# Patient Record
Sex: FEMALE | Race: BLACK OR AFRICAN AMERICAN | NOT HISPANIC OR LATINO | Employment: OTHER | URBAN - METROPOLITAN AREA
[De-identification: names, ages, dates, MRNs, and addresses within clinical notes are randomized per-mention and may not be internally consistent; named-entity substitution may affect disease eponyms.]

---

## 2021-07-14 ENCOUNTER — HISTORICAL (OUTPATIENT)
Dept: PREADMISSION TESTING | Facility: HOSPITAL | Age: 61
End: 2021-07-14

## 2021-07-14 LAB
ABS NEUT (OLG): 6.05 X10(3)/MCL (ref 2.1–9.2)
BASOPHILS # BLD AUTO: 0.1 X10(3)/MCL (ref 0–0.2)
BASOPHILS NFR BLD AUTO: 1 %
BUN SERPL-MCNC: 66 MG/DL (ref 9.8–20.1)
CALCIUM SERPL-MCNC: 8.8 MG/DL (ref 8.4–10.2)
CHLORIDE SERPL-SCNC: 107 MMOL/L (ref 98–107)
CO2 SERPL-SCNC: 19 MMOL/L (ref 23–31)
CREAT SERPL-MCNC: 3.77 MG/DL (ref 0.55–1.02)
CREAT/UREA NIT SERPL: 18
EOSINOPHIL # BLD AUTO: 0.1 X10(3)/MCL (ref 0–0.9)
EOSINOPHIL NFR BLD AUTO: 1 %
ERYTHROCYTE [DISTWIDTH] IN BLOOD BY AUTOMATED COUNT: 12.8 % (ref 11.5–17)
GLUCOSE SERPL-MCNC: 123 MG/DL (ref 82–115)
HCT VFR BLD AUTO: 28.6 % (ref 37–47)
HGB BLD-MCNC: 9.3 GM/DL (ref 12–16)
LYMPHOCYTES # BLD AUTO: 3 X10(3)/MCL (ref 0.6–4.6)
LYMPHOCYTES NFR BLD AUTO: 30 %
MCH RBC QN AUTO: 29 PG (ref 27–31)
MCHC RBC AUTO-ENTMCNC: 32.5 GM/DL (ref 33–36)
MCV RBC AUTO: 89.1 FL (ref 80–94)
MONOCYTES # BLD AUTO: 0.9 X10(3)/MCL (ref 0.1–1.3)
MONOCYTES NFR BLD AUTO: 8 %
NEUTROPHILS # BLD AUTO: 6.05 X10(3)/MCL (ref 2.1–9.2)
NEUTROPHILS NFR BLD AUTO: 59 %
PLATELET # BLD AUTO: 416 X10(3)/MCL (ref 130–400)
PMV BLD AUTO: 11.2 FL (ref 9.4–12.4)
POTASSIUM SERPL-SCNC: 4.9 MMOL/L (ref 3.5–5.1)
RBC # BLD AUTO: 3.21 X10(6)/MCL (ref 4.2–5.4)
SODIUM SERPL-SCNC: 137 MMOL/L (ref 136–145)
WBC # SPEC AUTO: 10.2 X10(3)/MCL (ref 4.5–11.5)

## 2021-07-26 ENCOUNTER — HISTORICAL (OUTPATIENT)
Dept: SURGERY | Facility: HOSPITAL | Age: 61
End: 2021-07-26

## 2021-07-26 LAB
ANION GAP SERPL CALC-SCNC: 16 MMOL/L
BUN SERPL-MCNC: >50 MG/DL (ref 8–26)
CHLORIDE SERPL-SCNC: 112 MMOL/L (ref 98–109)
CREAT SERPL-MCNC: 3.5 MG/DL (ref 0.6–1.3)
EST CREAT CLEARANCE SER (OHS): 22.73 ML/MIN
GLUCOSE SERPL-MCNC: 167 MG/DL (ref 70–105)
HCT VFR BLD CALC: 31 % (ref 38–51)
HGB BLD-MCNC: 10.5 MG/DL (ref 12–17)
POC IONIZED CALCIUM: 1.18 MMOL/L (ref 1.12–1.32)
POC TCO2: 19 MMOL/L (ref 24–29)
POTASSIUM BLD-SCNC: 5.3 MMOL/L (ref 3.5–4.9)
SARS-COV-2 AG RESP QL IA.RAPID: NEGATIVE
SODIUM BLD-SCNC: 141 MMOL/L (ref 138–146)

## 2021-09-29 ENCOUNTER — TELEPHONE (OUTPATIENT)
Dept: TRANSPLANT | Facility: CLINIC | Age: 61
End: 2021-09-29

## 2021-10-04 DIAGNOSIS — Z76.82 ORGAN TRANSPLANT CANDIDATE: Primary | ICD-10-CM

## 2021-10-13 ENCOUNTER — TELEPHONE (OUTPATIENT)
Dept: TRANSPLANT | Facility: CLINIC | Age: 61
End: 2021-10-13

## 2021-12-03 ENCOUNTER — TELEPHONE (OUTPATIENT)
Dept: TRANSPLANT | Facility: CLINIC | Age: 61
End: 2021-12-03
Payer: COMMERCIAL

## 2021-12-06 ENCOUNTER — TELEPHONE (OUTPATIENT)
Dept: TRANSPLANT | Facility: CLINIC | Age: 61
End: 2021-12-06
Payer: COMMERCIAL

## 2022-04-30 NOTE — OP NOTE
Patient:   Veronica Arauz             MRN: [id number]            FIN: [id number]               Age:   61 years     Sex:  Female     :  1960   Associated Diagnoses:   Chronic kidney disease, stage 5   Author:   Kala Rendon MD      Operative Note   Operative Information   Date/ Time:  2021 13:10:00.     Procedures Performed: Left radiocephalic fistula   .     Indications: Mrs. Arauz is a 62 y/o woman man with CKD who needs long term dialysis access.  She presents today for creation of a left upper extremity arteriovenous fistula..     Preoperative Diagnosis: Chronic kidney disease, stage 5 (VIE98-TY N18.5).     Postoperative Diagnosis: Chronic kidney disease, stage 5 ([id number] N18.5).     Surgeon: Kala Rendon MD.     Assistant.     Speciman Removed: none   .     Esimated blood loss: loss less than  100  cc.     Description of Procedure/Findings/    Complications: A regional block was placed prior to arriving in the room.  The left arm was prepped in the usual sterile fashion.  Ancef was administered prior to the incision.  An appropriate time out was performed.  Ultrasound was utilized to identify the cephalic vein.   A longitudinal incision was made between the radial artery and the cephalic vein.   Dissection was performed through the superficial soft tissues and then followed the appropriate plane laterally and medially to identify the cephalic vein and radial artery.  Each of these vessels were isolated and side branches were ligated with 4-0 silk when appropriate.  The cephalic vein was ligated distally with a 3-0 silk. The radial artery was smaller than expected and did exhibit some spasm with manipulation.  A longitudinal incision was made on the radial artery.  The vein was spatulated appropriately.  Anastamosis was created with a 6-0 prolene suture. Hemostasis was obtained and the soft tissue was dissected to allow for appropriate lay of the vein graft.  3-0 vicryl was  utilized to close the subcutaneous structures and a 4-0 monocryl was utilized to close the skin.  There was flow to the fistula and a palpable radial pulse at case completion. Dermabond dressing was used to dress the wound. This completed the procedure.      .     Findings:    ,    .     Complications: None.

## 2023-03-20 ENCOUNTER — HOSPITAL ENCOUNTER (INPATIENT)
Facility: HOSPITAL | Age: 63
LOS: 1 days | Discharge: HOME | DRG: 682 | End: 2023-03-22
Attending: EMERGENCY MEDICINE | Admitting: INTERNAL MEDICINE
Payer: COMMERCIAL

## 2023-03-20 DIAGNOSIS — N18.9 CHRONIC RENAL IMPAIRMENT, UNSPECIFIED CKD STAGE: ICD-10-CM

## 2023-03-20 DIAGNOSIS — R07.9 CHEST PAIN: ICD-10-CM

## 2023-03-20 DIAGNOSIS — D64.9 ANEMIA, UNSPECIFIED TYPE: ICD-10-CM

## 2023-03-20 DIAGNOSIS — R06.02 SOB (SHORTNESS OF BREATH): ICD-10-CM

## 2023-03-20 DIAGNOSIS — J96.01 ACUTE HYPOXEMIC RESPIRATORY FAILURE: Primary | ICD-10-CM

## 2023-03-20 DIAGNOSIS — I10 HYPERTENSION, UNSPECIFIED TYPE: ICD-10-CM

## 2023-03-20 DIAGNOSIS — E16.2 HYPOGLYCEMIA: ICD-10-CM

## 2023-03-20 DIAGNOSIS — E87.70 HYPERVOLEMIA, UNSPECIFIED HYPERVOLEMIA TYPE: ICD-10-CM

## 2023-03-20 DIAGNOSIS — J81.0 ACUTE PULMONARY EDEMA: ICD-10-CM

## 2023-03-20 DIAGNOSIS — N18.6 ESRD (END STAGE RENAL DISEASE): ICD-10-CM

## 2023-03-20 PROBLEM — E78.5 HYPERLIPIDEMIA: Status: ACTIVE | Noted: 2023-03-20

## 2023-03-20 PROBLEM — E11.9 DIABETES MELLITUS: Status: ACTIVE | Noted: 2023-03-20

## 2023-03-20 LAB
ALBUMIN SERPL-MCNC: 3.2 G/DL (ref 3.4–4.8)
ALBUMIN/GLOB SERPL: 0.7 RATIO (ref 1.1–2)
ALP SERPL-CCNC: 125 UNIT/L (ref 40–150)
ALT SERPL-CCNC: 21 UNIT/L (ref 0–55)
APPEARANCE UR: CLEAR
AST SERPL-CCNC: 20 UNIT/L (ref 5–34)
BACTERIA #/AREA URNS AUTO: ABNORMAL /HPF
BASOPHILS # BLD AUTO: 0.04 X10(3)/MCL (ref 0–0.2)
BASOPHILS NFR BLD AUTO: 0.2 %
BILIRUB UR QL STRIP.AUTO: NEGATIVE MG/DL
BILIRUBIN DIRECT+TOT PNL SERPL-MCNC: 0.5 MG/DL
BNP BLD-MCNC: 262.8 PG/ML
BUN SERPL-MCNC: 87.6 MG/DL (ref 9.8–20.1)
CALCIUM SERPL-MCNC: 8.6 MG/DL (ref 8.4–10.2)
CHLORIDE SERPL-SCNC: 109 MMOL/L (ref 98–107)
CO2 SERPL-SCNC: 14 MMOL/L (ref 23–31)
COLOR UR AUTO: COLORLESS
CORRECTED TEMPERATURE (PCO2): 34 MMHG (ref 35–45)
CORRECTED TEMPERATURE (PH): 7.24 (ref 7.3–7.6)
CORRECTED TEMPERATURE (PO2): 72 MMHG (ref 75–100)
CREAT SERPL-MCNC: 6.64 MG/DL (ref 0.55–1.02)
EOSINOPHIL # BLD AUTO: 0.09 X10(3)/MCL (ref 0–0.9)
EOSINOPHIL NFR BLD AUTO: 0.5 %
ERYTHROCYTE [DISTWIDTH] IN BLOOD BY AUTOMATED COUNT: 12.6 % (ref 11.5–17)
EST. AVERAGE GLUCOSE BLD GHB EST-MCNC: 91.1 MG/DL
FERRITIN SERPL-MCNC: 542.32 NG/ML (ref 4.63–204)
FLUAV AG UPPER RESP QL IA.RAPID: NOT DETECTED
FLUBV AG UPPER RESP QL IA.RAPID: NOT DETECTED
GFR SERPLBLD CREATININE-BSD FMLA CKD-EPI: 7 MLS/MIN/1.73/M2
GLOBULIN SER-MCNC: 4.9 GM/DL (ref 2.4–3.5)
GLUCOSE SERPL-MCNC: 78 MG/DL (ref 82–115)
GLUCOSE UR QL STRIP.AUTO: NORMAL MG/DL
HBA1C MFR BLD: 4.8 %
HCO3 UR-SCNC: 14.6 MMOL/L
HCT VFR BLD AUTO: 27.3 % (ref 37–47)
HGB BLD-MCNC: 8.9 G/DL (ref 12–16)
HGB BLD-MCNC: 9.3 G/DL (ref 12–18)
HYALINE CASTS #/AREA URNS LPF: ABNORMAL /LPF
IMM GRANULOCYTES # BLD AUTO: 0.07 X10(3)/MCL (ref 0–0.04)
IMM GRANULOCYTES NFR BLD AUTO: 0.4 %
IRON SATN MFR SERPL: 19 % (ref 20–50)
IRON SERPL-MCNC: 36 UG/DL (ref 50–170)
KETONES UR QL STRIP.AUTO: NEGATIVE MG/DL
LEUKOCYTE ESTERASE UR QL STRIP.AUTO: NEGATIVE UNIT/L
LYMPHOCYTES # BLD AUTO: 1.38 X10(3)/MCL (ref 0.6–4.6)
LYMPHOCYTES NFR BLD AUTO: 8.3 %
MAGNESIUM SERPL-MCNC: 2.9 MG/DL (ref 1.6–2.6)
MCH RBC QN AUTO: 29.8 PG
MCHC RBC AUTO-ENTMCNC: 32.6 G/DL (ref 33–36)
MCV RBC AUTO: 91.3 FL (ref 80–94)
MONOCYTES # BLD AUTO: 1.17 X10(3)/MCL (ref 0.1–1.3)
MONOCYTES NFR BLD AUTO: 7 %
MUCOUS THREADS URNS QL MICRO: ABNORMAL /LPF
NEUTROPHILS # BLD AUTO: 13.96 X10(3)/MCL (ref 2.1–9.2)
NEUTROPHILS NFR BLD AUTO: 83.6 %
NITRITE UR QL STRIP.AUTO: NEGATIVE
NRBC BLD AUTO-RTO: 0 %
PCO2 BLDA: 34 MMHG (ref 35–45)
PH SMN: 7.24 [PH] (ref 7.3–7.6)
PH UR STRIP.AUTO: 5.5 [PH]
PHOSPHATE SERPL-MCNC: 7.7 MG/DL (ref 2.3–4.7)
PLATELET # BLD AUTO: 363 X10(3)/MCL (ref 130–400)
PMV BLD AUTO: 10.8 FL (ref 7.4–10.4)
PO2 BLDA: 72 MMHG (ref 75–100)
POC BASE DEFICIT: -11.8 MMOL/L (ref -2–2)
POC COHB: 1.6 % (ref 0.5–1.5)
POC METHB: 0.8 % (ref 0–1.5)
POC O2HB: 92.8 % (ref 94–100)
POC PERFORMED BY: ABNORMAL
POC SATURATED O2: 91 % (ref 90–100)
POC TEMPERATURE: 37 C
POCT GLUCOSE: 105 MG/DL (ref 70–110)
POCT GLUCOSE: 116 MG/DL (ref 70–110)
POCT GLUCOSE: 92 MG/DL (ref 70–110)
POTASSIUM SERPL-SCNC: 4.8 MMOL/L (ref 3.5–5.1)
PROT SERPL-MCNC: 8.1 GM/DL (ref 5.8–7.6)
PROT UR QL STRIP.AUTO: ABNORMAL MG/DL
RBC # BLD AUTO: 2.99 X10(6)/MCL (ref 4.2–5.4)
RBC #/AREA URNS AUTO: ABNORMAL /HPF
RBC UR QL AUTO: ABNORMAL UNIT/L
SARS-COV-2 RNA RESP QL NAA+PROBE: NOT DETECTED
SODIUM SERPL-SCNC: 137 MMOL/L (ref 136–145)
SP GR UR STRIP.AUTO: 1.01
SPECIMEN SOURCE: ABNORMAL
SQUAMOUS #/AREA URNS LPF: ABNORMAL /HPF
TIBC SERPL-MCNC: 150 UG/DL (ref 70–310)
TIBC SERPL-MCNC: 186 UG/DL (ref 250–450)
TRANSFERRIN SERPL-MCNC: 159 MG/DL (ref 173–360)
TROPONIN I SERPL-MCNC: 0.04 NG/ML (ref 0–0.04)
TSH SERPL-ACNC: 0.8 UIU/ML (ref 0.35–4.94)
UROBILINOGEN UR STRIP-ACNC: NORMAL MG/DL
WBC # SPEC AUTO: 16.7 X10(3)/MCL (ref 4.5–11.5)
WBC #/AREA URNS AUTO: ABNORMAL /HPF

## 2023-03-20 PROCEDURE — 83036 HEMOGLOBIN GLYCOSYLATED A1C: CPT | Performed by: EMERGENCY MEDICINE

## 2023-03-20 PROCEDURE — 81001 URINALYSIS AUTO W/SCOPE: CPT | Performed by: EMERGENCY MEDICINE

## 2023-03-20 PROCEDURE — 84484 ASSAY OF TROPONIN QUANT: CPT | Performed by: EMERGENCY MEDICINE

## 2023-03-20 PROCEDURE — G0378 HOSPITAL OBSERVATION PER HR: HCPCS

## 2023-03-20 PROCEDURE — 36600 WITHDRAWAL OF ARTERIAL BLOOD: CPT

## 2023-03-20 PROCEDURE — 63600175 PHARM REV CODE 636 W HCPCS

## 2023-03-20 PROCEDURE — 84100 ASSAY OF PHOSPHORUS: CPT | Performed by: EMERGENCY MEDICINE

## 2023-03-20 PROCEDURE — 96365 THER/PROPH/DIAG IV INF INIT: CPT

## 2023-03-20 PROCEDURE — 83880 ASSAY OF NATRIURETIC PEPTIDE: CPT | Performed by: EMERGENCY MEDICINE

## 2023-03-20 PROCEDURE — 83735 ASSAY OF MAGNESIUM: CPT | Performed by: EMERGENCY MEDICINE

## 2023-03-20 PROCEDURE — 84443 ASSAY THYROID STIM HORMONE: CPT | Performed by: EMERGENCY MEDICINE

## 2023-03-20 PROCEDURE — 96372 THER/PROPH/DIAG INJ SC/IM: CPT

## 2023-03-20 PROCEDURE — 83550 IRON BINDING TEST: CPT

## 2023-03-20 PROCEDURE — 82728 ASSAY OF FERRITIN: CPT

## 2023-03-20 PROCEDURE — 93005 ELECTROCARDIOGRAM TRACING: CPT

## 2023-03-20 PROCEDURE — 82962 GLUCOSE BLOOD TEST: CPT

## 2023-03-20 PROCEDURE — 96375 TX/PRO/DX INJ NEW DRUG ADDON: CPT

## 2023-03-20 PROCEDURE — 25000003 PHARM REV CODE 250

## 2023-03-20 PROCEDURE — 99285 EMERGENCY DEPT VISIT HI MDM: CPT | Mod: 25

## 2023-03-20 PROCEDURE — 99900035 HC TECH TIME PER 15 MIN (STAT)

## 2023-03-20 PROCEDURE — 0240U COVID/FLU A&B PCR: CPT | Performed by: EMERGENCY MEDICINE

## 2023-03-20 PROCEDURE — 80053 COMPREHEN METABOLIC PANEL: CPT | Performed by: EMERGENCY MEDICINE

## 2023-03-20 PROCEDURE — 82803 BLOOD GASES ANY COMBINATION: CPT

## 2023-03-20 PROCEDURE — 87040 BLOOD CULTURE FOR BACTERIA: CPT

## 2023-03-20 PROCEDURE — 85025 COMPLETE CBC W/AUTO DIFF WBC: CPT | Performed by: EMERGENCY MEDICINE

## 2023-03-20 RX ORDER — METOPROLOL SUCCINATE 25 MG/1
25 TABLET, EXTENDED RELEASE ORAL
Status: ON HOLD | COMMUNITY
Start: 2023-03-01 | End: 2023-03-22 | Stop reason: HOSPADM

## 2023-03-20 RX ORDER — LABETALOL HCL 20 MG/4 ML
10 SYRINGE (ML) INTRAVENOUS EVERY 4 HOURS PRN
Status: DISCONTINUED | OUTPATIENT
Start: 2023-03-20 | End: 2023-03-23 | Stop reason: HOSPADM

## 2023-03-20 RX ORDER — DULAGLUTIDE 1.5 MG/.5ML
1.5 INJECTION, SOLUTION SUBCUTANEOUS
Status: ON HOLD | COMMUNITY
Start: 2023-01-27 | End: 2023-09-08 | Stop reason: HOSPADM

## 2023-03-20 RX ORDER — TORSEMIDE 20 MG/1
40 TABLET ORAL
Status: ON HOLD | COMMUNITY
Start: 2023-03-01 | End: 2023-03-22 | Stop reason: HOSPADM

## 2023-03-20 RX ORDER — CALCITRIOL 0.25 UG/1
0.5 CAPSULE ORAL DAILY
COMMUNITY
Start: 2022-12-01 | End: 2023-10-05

## 2023-03-20 RX ORDER — LOSARTAN POTASSIUM 50 MG/1
50 TABLET ORAL DAILY
Status: ON HOLD | COMMUNITY
Start: 2023-03-01 | End: 2023-09-08 | Stop reason: HOSPADM

## 2023-03-20 RX ORDER — CALCITRIOL 0.25 UG/1
0.5 CAPSULE ORAL DAILY
Status: DISCONTINUED | OUTPATIENT
Start: 2023-03-21 | End: 2023-03-23 | Stop reason: HOSPADM

## 2023-03-20 RX ORDER — DEXTROSE 40 %
30 GEL (GRAM) ORAL
Status: DISCONTINUED | OUTPATIENT
Start: 2023-03-20 | End: 2023-03-23 | Stop reason: HOSPADM

## 2023-03-20 RX ORDER — HEPARIN SODIUM 5000 [USP'U]/ML
7500 INJECTION, SOLUTION INTRAVENOUS; SUBCUTANEOUS EVERY 12 HOURS
Status: DISCONTINUED | OUTPATIENT
Start: 2023-03-20 | End: 2023-03-23 | Stop reason: HOSPADM

## 2023-03-20 RX ORDER — AMLODIPINE BESYLATE 5 MG/1
5 TABLET ORAL DAILY
Status: DISCONTINUED | OUTPATIENT
Start: 2023-03-21 | End: 2023-03-21

## 2023-03-20 RX ORDER — HYDRALAZINE HYDROCHLORIDE 20 MG/ML
10 INJECTION INTRAMUSCULAR; INTRAVENOUS EVERY 8 HOURS PRN
Status: DISCONTINUED | OUTPATIENT
Start: 2023-03-20 | End: 2023-03-23 | Stop reason: HOSPADM

## 2023-03-20 RX ORDER — CALCITRIOL 0.5 UG/1
1 CAPSULE ORAL
Status: ON HOLD | COMMUNITY
Start: 2023-03-01 | End: 2023-03-22 | Stop reason: HOSPADM

## 2023-03-20 RX ORDER — DEXTROSE 40 %
15 GEL (GRAM) ORAL
Status: DISCONTINUED | OUTPATIENT
Start: 2023-03-20 | End: 2023-03-23 | Stop reason: HOSPADM

## 2023-03-20 RX ORDER — AMLODIPINE BESYLATE 5 MG/1
5 TABLET ORAL DAILY
COMMUNITY
Start: 2023-03-01

## 2023-03-20 RX ORDER — GLIMEPIRIDE 4 MG/1
4 TABLET ORAL EVERY MORNING
Status: ON HOLD | COMMUNITY
Start: 2023-02-01 | End: 2023-03-22 | Stop reason: HOSPADM

## 2023-03-20 RX ORDER — NALOXONE HCL 0.4 MG/ML
0.02 VIAL (ML) INJECTION
Status: DISCONTINUED | OUTPATIENT
Start: 2023-03-20 | End: 2023-03-23 | Stop reason: HOSPADM

## 2023-03-20 RX ORDER — HYDRALAZINE HYDROCHLORIDE 25 MG/1
25 TABLET, FILM COATED ORAL EVERY 12 HOURS
Status: DISCONTINUED | OUTPATIENT
Start: 2023-03-20 | End: 2023-03-21

## 2023-03-20 RX ORDER — FUROSEMIDE 10 MG/ML
40 INJECTION INTRAMUSCULAR; INTRAVENOUS ONCE
Status: DISCONTINUED | OUTPATIENT
Start: 2023-03-20 | End: 2023-03-20

## 2023-03-20 RX ORDER — METOPROLOL SUCCINATE 25 MG/1
25 TABLET, EXTENDED RELEASE ORAL 2 TIMES DAILY
Status: DISCONTINUED | OUTPATIENT
Start: 2023-03-20 | End: 2023-03-21

## 2023-03-20 RX ORDER — SODIUM CHLORIDE 0.9 % (FLUSH) 0.9 %
10 SYRINGE (ML) INJECTION EVERY 12 HOURS PRN
Status: DISCONTINUED | OUTPATIENT
Start: 2023-03-20 | End: 2023-03-23 | Stop reason: HOSPADM

## 2023-03-20 RX ORDER — FUROSEMIDE 10 MG/ML
80 INJECTION INTRAMUSCULAR; INTRAVENOUS ONCE
Status: COMPLETED | OUTPATIENT
Start: 2023-03-20 | End: 2023-03-20

## 2023-03-20 RX ORDER — INSULIN ASPART 100 [IU]/ML
0-5 INJECTION, SOLUTION INTRAVENOUS; SUBCUTANEOUS
Status: DISCONTINUED | OUTPATIENT
Start: 2023-03-20 | End: 2023-03-23 | Stop reason: HOSPADM

## 2023-03-20 RX ORDER — GLUCAGON 1 MG
1 KIT INJECTION
Status: DISCONTINUED | OUTPATIENT
Start: 2023-03-20 | End: 2023-03-23 | Stop reason: HOSPADM

## 2023-03-20 RX ADMIN — METOPROLOL SUCCINATE 25 MG: 25 TABLET, EXTENDED RELEASE ORAL at 08:03

## 2023-03-20 RX ADMIN — HEPARIN SODIUM 7500 UNITS: 5000 INJECTION, SOLUTION INTRAVENOUS; SUBCUTANEOUS at 08:03

## 2023-03-20 RX ADMIN — CEFTRIAXONE SODIUM 1 G: 1 INJECTION, POWDER, FOR SOLUTION INTRAMUSCULAR; INTRAVENOUS at 08:03

## 2023-03-20 RX ADMIN — LABETALOL HYDROCHLORIDE 10 MG: 5 INJECTION, SOLUTION INTRAVENOUS at 06:03

## 2023-03-20 RX ADMIN — FUROSEMIDE 80 MG: 10 INJECTION, SOLUTION INTRAMUSCULAR; INTRAVENOUS at 06:03

## 2023-03-20 RX ADMIN — HYDRALAZINE HYDROCHLORIDE 25 MG: 25 TABLET ORAL at 08:03

## 2023-03-20 NOTE — H&P
"History and Physical     Date of Admit: 3/20/2023  Current Hospital Day: 1     Subjective:      Brief HPI:  Veronica Arauz is a 62 y.o. female who  has a past medical history of Diabetes mellitus and Renal disorder.  She presented to [facility] ED on 3/19/2023  with a primary complaint of generalized weakness, shakiness, and changes in speech. Was found to be hypoglycemic in the 30s. Sx improved after given juice and sandwich and glucagon. Incidentally found to have UTI and was given IV CTX for one dose and discharged home with outpatient macrobid which has not been filled because this morning patient had another hypoglycemic episode and was seen again in [facility] given orange juice, sandwich and patient returned to baseline mentation. Although she was SOB with signs of volume overload at that time, was given lasix in the ED. There she also satted 74% on RA per triage note. Patient states that for the past 2 weeks she has been getting congestion, dry cough, post-nasal drip and mild dyspnea with exertion. Stated that her ASHER would resolved rapidly after resting. Reports sleeping in recliner because she has trouble breathing lying in bed. Does endorse leg swelling, foul smelling urine.  She denies any fever, chills, sinus pain, sore throat, N/V/D, abd pain, sputum production, dysuria, hematuria, CP, dysuria, frequency, hematuria.         Patient follows Dr. Camacho for nephrology. Has had a LUE AV fistula for "over 2 years" anticipating the need for dialysis although has not been used yet.    Prior records indicated patient is on trulicity but pt states she discontinued this herself a month ago. She is on amaryl which her pcp had recently reduced dose from 4 to 2 2/2 hypoglycemic episodes.     Past Medical History:   Diagnosis Date    Diabetes mellitus     Renal disorder        Past Surgical History:   Procedure Laterality Date    HYSTERECTOMY         Review of patient's allergies indicates:  No Known " Allergies    Current Outpatient Medications   Medication Instructions    amLODIPine (NORVASC) 5 mg, Oral    calcitRIOL (ROCALTROL) 0.5 mcg, Oral    calcitRIOL (ROCALTROL) 1 mcg, Oral    glimepiride (AMARYL) 4 mg, Oral, Every morning    losartan (COZAAR) 50 mg, Oral    metoprolol succinate (TOPROL-XL) 25 mg, Oral    torsemide (DEMADEX) 40 mg, Oral    TRULICITY 1.5 mg/0.5 mL pen injector Subcutaneous        Family History    None         Tobacco Use    Smoking status: Never    Smokeless tobacco: Never   Substance and Sexual Activity    Alcohol use: Never    Drug use: Never    Sexual activity: Not on file        Review of Systems:  Review of Systems   Constitutional:  Negative for chills and fever.   HENT:  Positive for congestion. Negative for sinus pain and sore throat.         (+) post nasal drip   Respiratory:  Positive for cough and shortness of breath. Negative for hemoptysis and sputum production.    Cardiovascular:  Positive for leg swelling. Negative for chest pain and palpitations.   Gastrointestinal:  Negative for abdominal pain, nausea and vomiting.   Genitourinary:  Negative for flank pain and hematuria.   Skin:  Negative for itching and rash.   Neurological:  Negative for weakness and headaches.   Endo/Heme/Allergies:  Does not bruise/bleed easily.        Objective:     Vital Signs:  Vital Signs (Most Recent):  Temp: 98.2 °F (36.8 °C) (03/20/23 1233)  Pulse: 94 (03/20/23 1702)  Resp: (!) 26 (03/20/23 1702)  BP: (!) 193/99 (03/20/23 1702)  SpO2: 97 % (03/20/23 1702)   Vital Signs (24h Range):  Temp:  [98.2 °F (36.8 °C)] 98.2 °F (36.8 °C)  Pulse:  [87-97] 94  Resp:  [20-31] 26  SpO2:  [74 %-100 %] 97 %  BP: (173-193)/(82-99) 193/99   Body mass index is 41.12 kg/m².   No intake or output data in the 24 hours ending 03/20/23 1749    Physical Examination:  General:  Well developed, well nourished, no acute distress. Sitting in chair by bed wrapped in blankets.   Head: Normocephalic, atraumatic  ENT: PERRL,  MMM. Pharynx is clear without erythema, exudates.No post-nasal drip. Uvula midline.   Neck: supple, normal ROM, no JVD  CVS:  RRR. S1 and S2 normal. No murmurs, rubs, or gallops. Regular peripheral pulses. 2-3+ edema to BLE. LUE with AV graft with palpable thrill and audible bruit.   Resp:  Lungs clear to auscultation bilaterally, no wheezes, rales, or rhonchi. Normal respiratory effort.  GI:  Abdomen soft, non-tender, non-distended, normoactive bowel sounds  MSK:  Full range of motion, no obvious deformities  Skin:  No rashes, ulcers, erythema  Neuro:  Alert and oriented x3, No focal neuro deficits, CNII-XII grossly intact  Psych:  Appropriate mood and affect     Laboratory:    Recent Labs   Lab 03/20/23  1511   WBC 16.7*   HGB 8.9*   HCT 27.3*      MCV 91.3   RDW 12.6     Recent Labs   Lab 03/20/23  1511   TROPONINI 0.036   .8*     Recent Labs   Lab 03/20/23  1511   TROPONINI 0.036   .8*     No results for input(s): CHOL, HDL, LDLCALC, TRIG, CHOLHDL in the last 168 hours. Recent Labs   Lab 03/20/23  1511      K 4.8   CO2 14*   BUN 87.6*   CREATININE 6.64*   CALCIUM 8.6   MG 2.90*   PHOS 7.7*     Recent Labs   Lab 03/20/23  1511   ALBUMIN 3.2*   BILITOT 0.5   AST 20   ALKPHOS 125   ALT 21     No results for input(s): IRON, TIBC, FERRITIN, SATURATEDIRO, [id number], FOLATE in the last 168 hours.  Recent Labs   Lab 03/20/23  1511   TSH 0.798   HGBA1C 4.8          Microbiology Data:  Microbiology Results (last 7 days)       ** No results found for the last 168 hours. **             Other Results:    Radiology:  Imaging Results              X-Ray Chest PA And Lateral (Final result)  Result time 03/20/23 16:07:30      Final result by Stoney Gutierrez MD (03/20/23 16:07:30)                   Impression:      Central vascular congestion with mild edema      Electronically signed by: Stoney Gutierrez MD  Date:    03/20/2023  Time:    16:07               Narrative:    EXAMINATION:  XR CHEST PA AND  LATERAL    CLINICAL HISTORY:  Chest Pain;    COMPARISON:  07/14/2020    FINDINGS:  PA and lateral views of the chest show no focal consolidation, pneumothorax or pleural effusion.  Cardiac silhouette is enlarged.  There is central vascular congestion with prominent interstitial markings bilaterally.  Aorta is partially calcified.  No acute osseous findings.                                    X-Ray Chest PA And Lateral    Result Date: 3/20/2023  Central vascular congestion with mild edema Electronically signed by: Stoney Gutierrez MD Date:    03/20/2023 Time:    16:07       Current Medications:     Infusions:        Scheduled:   [START ON 3/21/2023] amLODIPine  5 mg Oral Daily    [START ON 3/21/2023] calcitRIOL  0.5 mcg Oral Daily    furosemide (LASIX) injection  80 mg Intravenous Once    heparin (porcine)  7,500 Units Subcutaneous Q12H    hydrALAZINE  25 mg Oral Q12H    metoprolol succinate  25 mg Oral BID         PRN:   dextrose 10%    dextrose 10%    dextrose    dextrose    glucagon (human recombinant)    hydrALAZINE    labetalol    naloxone    sodium chloride 0.9%        Antibiotics and Day Number of Therapy:  Antibiotics (From admission, onward)      None                 Assessment & Plan:     Acute hypoxemic respiratory failure  Anion gap (14) Metabolic acidosis  Volume overload  - comfortable on 4L O2 by NC.   - ABG pH 7.24, pco2 34, po2 72, hco3 14.6.  - BNP elevated 262.8  - CXR showing central vascular congestion, pedal edema 2-3+ on exam.    - giving lasix 80 tonight.   - gap suspected 2/2 uremia from esrd.     UTI   Leukocytosis wbc = 16.7  - Given CTX at outside facility.   - UA ordered.   - blood cultures x2 ordered.   - Will continue CTX as patient has left shift.    ESRD  Secondary hyperparathyroidism  - Nephrology consulted, appreciate the assistance. They agree with 80 IV lasix to attempt diuresis and will see patient in the morning. Do not suspect emergent HD is required at this time.  - pedal edema  present  -  LUE fistula in place palpable thrill, audible bruit  - avoid nephrotoxic medications  - continue home calcitriol.     HTN urgency  - PRN labetalol, hydralazine ordered.   - continue home amlodipine 5mg  - held home losartan 2/2 acute kidney injury.   - increased home toprol 25 mg daily to BID.   - starting hydralazine 25 mg BID  - CTM and titrate as indicated.     DM2  - Hold home trulicity and glimepiride.   - Low dose SSI        CODE STATUS: full  Access: piv  Antibiotics: none  Diet: diabetic, renal  DVT Prophylaxis: heparin 7500u BID   GI Prophylaxis: none  Fluids: none           Ronal Peñaloza DO  Internal Medicine Resident PGY-I

## 2023-03-20 NOTE — ED PROVIDER NOTES
Encounter Date: 3/20/2023       History     Chief Complaint   Patient presents with    Shortness of Breath     Pt a transfer from [facility] for nephrology.  Pt also complaining of SOB and has an O2 sat of 74% on room air.     Received in transfer, outside records reviewed, discussed with Dr. Leblanc at [facility] in [place].  Patient with chronic renal failure on oral therapy as well for diabetes, has an indwelling left forearm AV shunt for 2 years but has not yet undergone dialysis.  She is followed by Dr. Camacho of Nephrology.  She presented to the ER yesterday with hypoglycemia, symptomatic, does take Trulicity and Amaryl.  Dose of Amaryl recently changed from 4-2 mg a day about a month ago.  She was treated and released but had recurrent hypoglycemia again early this morning, this time on presentation was found to be hypoxic and with signs of significant volume overload including likely pulmonary edema.  She does relate that her lower extremity swelling has been getting worse recently.  Data were comprehensive and are reviewed, notable for mild respiratory acidosis metabolic acidosis with respiratory compensation, anemia, chronic renal failure, mildly elevated BNP, pulmonary edema versus pneumonia by chest film.  That facility did not have available nephrology services, she was transferred here for internal medicine evaluation and likely Nephrology and Cardiology consults.  At the time of transfer she is feeling much better, oxygenating well in an upright position on 3 L nasal cannula. Consulted Internal Medicine on arrival..    The history is provided by the patient and medical records. No  was used.   Review of patient's allergies indicates:  No Known Allergies  Past Medical History:   Diagnosis Date    Diabetes mellitus     Renal disorder      Past Surgical History:   Procedure Laterality Date    HYSTERECTOMY       History reviewed. No pertinent family  history.  Social History     Tobacco Use    Smoking status: Never    Smokeless tobacco: Never   Substance Use Topics    Alcohol use: Never    Drug use: Never     Review of Systems   Constitutional:  Negative for activity change, fatigue and fever.   HENT:  Negative for congestion, ear pain, facial swelling, nosebleeds, sinus pressure and sore throat.    Eyes:  Negative for pain, discharge, redness and visual disturbance.   Respiratory:  Positive for shortness of breath. Negative for cough, choking, chest tightness and wheezing.    Cardiovascular:  Positive for leg swelling. Negative for chest pain and palpitations.   Gastrointestinal:  Negative for abdominal distention, abdominal pain, nausea and vomiting.   Endocrine: Negative for heat intolerance, polydipsia and polyuria.   Genitourinary:  Negative for difficulty urinating, dysuria, flank pain, hematuria and urgency.   Musculoskeletal:  Negative for back pain, gait problem, joint swelling and myalgias.   Skin:  Negative for color change and rash.   Allergic/Immunologic: Negative for environmental allergies and food allergies.   Neurological:  Negative for dizziness, weakness, numbness and headaches.   Hematological:  Negative for adenopathy. Does not bruise/bleed easily.   Psychiatric/Behavioral:  Negative for agitation and behavioral problems. The patient is not nervous/anxious.    All other systems reviewed and are negative.    Physical Exam     Initial Vitals [03/20/23 1233]   BP Pulse Resp Temp SpO2   (!) 174/83 97 (!) 31 98.2 °F (36.8 °C) (!) 74 %      MAP       --         Physical Exam    Nursing note and vitals reviewed.  Constitutional: She appears well-developed and well-nourished. She is not diaphoretic. No distress.   HENT:   Head: Normocephalic and atraumatic.   Mouth/Throat: No oropharyngeal exudate.   Eyes: Conjunctivae and EOM are normal. Pupils are equal, round, and reactive to light. Right eye exhibits no discharge. Left eye exhibits no discharge.  No scleral icterus.   Neck: Neck supple. No thyromegaly present. No tracheal deviation present. No JVD present.   Normal range of motion.  Cardiovascular:  Normal rate, regular rhythm, normal heart sounds and intact distal pulses.     Exam reveals no gallop and no friction rub.       No murmur heard.  Expected thrill and bruit, left forearm AV shunt without sign of infection or dysfunction.   Pulmonary/Chest: No stridor. No respiratory distress. She has no wheezes. She has no rhonchi. She has no rales. She exhibits no tenderness.   Mild tachypnea, dull at the bases   Abdominal: Abdomen is soft. Bowel sounds are normal. She exhibits no distension and no mass. There is no abdominal tenderness. There is no rebound and no guarding.   Musculoskeletal:         General: Edema present. No tenderness. Normal range of motion.      Cervical back: Normal range of motion and neck supple.      Comments: Three or 4+ pitting edema to thighs     Neurological: She is alert and oriented to person, place, and time. She has normal strength.   Skin: Skin is warm and dry. No rash and no abscess noted. No erythema.   Psychiatric: She has a normal mood and affect. Her behavior is normal. Judgment and thought content normal.       ED Course   Procedures  Labs Reviewed   CBC W/ AUTO DIFFERENTIAL    Narrative:     The following orders were created for panel order CBC auto differential.  Procedure                               Abnormality         Status                     ---------                               -----------         ------                     CBC with Differential[[id number]]                                                         Please view results for these tests on the individual orders.   COMPREHENSIVE METABOLIC PANEL   URINALYSIS, REFLEX TO URINE CULTURE   TSH   TROPONIN I   B-TYPE NATRIURETIC PEPTIDE   MAGNESIUM   PHOSPHORUS   COVID/FLU A&B PCR   HEMOGLOBIN A1C   CBC WITH DIFFERENTIAL   POCT GLUCOSE, HAND-HELD DEVICE    POCT GLUCOSE     EKG Readings: (Independently Interpreted)   Initial Reading: No STEMI. Rhythm: Sinus Tachycardia. Heart Rate: 101. Ectopy: No Ectopy.   Sinus tachycardia 101 beats per minute, possible left atrial enlargement, lateral ST and T-wave abnormality possibly ischemic, slightly noisy baseline.  No ST elevation.   ECG Results              EKG 12-lead (In process)  Result time 03/20/23 15:01:32      In process by Interface, [facility] (03/20/23 15:01:32)                   Narrative:    Test Reason : R06.02,    Vent. Rate : 101 BPM     Atrial Rate : 101 BPM     P-R Int : 144 ms          QRS Dur : 074 ms      QT Int : 336 ms       P-R-T Axes : 055 028 133 degrees     QTc Int : 435 ms    Sinus tachycardia  Possible Left atrial enlargement  ST and T wave abnormality, consider lateral ischemia  Abnormal ECG  No previous ECGs available    Referred By: JUWAN COSTELLO           Confirmed By:                                   Imaging Results    None          Medications - No data to display     Additional MDM:   Differential Diagnosis:   Pulmonary Edema/ CHF/ Renal Failure/ Hypoglycemia                 Medical Decision Making  Problems Addressed:  Acute hypoxemic respiratory failure: acute illness or injury  Acute pulmonary edema: acute illness or injury that poses a threat to life or bodily functions    Amount and/or Complexity of Data Reviewed  External Data Reviewed: labs, radiology, ECG and notes.  Labs: ordered. Decision-making details documented in ED Course.  Radiology: ordered. Decision-making details documented in ED Course.  ECG/medicine tests: ordered and independent interpretation performed. Decision-making details documented in ED Course.    Risk  Prescription drug management.  Decision regarding hospitalization.             Clinical Impression:   Final diagnoses:  [R06.02] SOB (shortness of breath)  [J96.01] Acute hypoxemic respiratory failure (Primary)  [J81.0] Acute pulmonary  edema  [E87.70] Hypervolemia, unspecified hypervolemia type  [N18.9] Chronic renal impairment, unspecified CKD stage  [D64.9] Anemia, unspecified type  [I10] Hypertension, unspecified type  [E16.2] Hypoglycemia        ED Disposition Condition    Admit Stable                Cristopher Pierson MD  03/20/23 8420       Cristopher Pierson MD  03/20/23 6434

## 2023-03-21 LAB
ALBUMIN SERPL-MCNC: 3 G/DL (ref 3.4–4.8)
ALBUMIN/GLOB SERPL: 0.7 RATIO (ref 1.1–2)
ALP SERPL-CCNC: 115 UNIT/L (ref 40–150)
ALT SERPL-CCNC: 20 UNIT/L (ref 0–55)
AST SERPL-CCNC: 20 UNIT/L (ref 5–34)
BASOPHILS # BLD AUTO: 0.05 X10(3)/MCL (ref 0–0.2)
BASOPHILS NFR BLD AUTO: 0.3 %
BILIRUBIN DIRECT+TOT PNL SERPL-MCNC: 0.5 MG/DL
BUN SERPL-MCNC: 85.3 MG/DL (ref 9.8–20.1)
CALCIUM SERPL-MCNC: 8.8 MG/DL (ref 8.4–10.2)
CHLORIDE SERPL-SCNC: 111 MMOL/L (ref 98–107)
CO2 SERPL-SCNC: 13 MMOL/L (ref 23–31)
CREAT SERPL-MCNC: 6.55 MG/DL (ref 0.55–1.02)
EOSINOPHIL # BLD AUTO: 0.12 X10(3)/MCL (ref 0–0.9)
EOSINOPHIL NFR BLD AUTO: 0.8 %
ERYTHROCYTE [DISTWIDTH] IN BLOOD BY AUTOMATED COUNT: 12.8 % (ref 11.5–17)
GFR SERPLBLD CREATININE-BSD FMLA CKD-EPI: 7 MLS/MIN/1.73/M2
GLOBULIN SER-MCNC: 4.5 GM/DL (ref 2.4–3.5)
GLUCOSE SERPL-MCNC: 61 MG/DL (ref 82–115)
HAV IGM SERPL QL IA: NONREACTIVE
HBV CORE IGM SERPL QL IA: NONREACTIVE
HBV SURFACE AG SERPL QL IA: NONREACTIVE
HCT VFR BLD AUTO: 26.4 % (ref 37–47)
HCV AB SERPL QL IA: NONREACTIVE
HGB BLD-MCNC: 8.5 G/DL (ref 12–16)
IMM GRANULOCYTES # BLD AUTO: 0.1 X10(3)/MCL (ref 0–0.04)
IMM GRANULOCYTES NFR BLD AUTO: 0.7 %
LYMPHOCYTES # BLD AUTO: 1.73 X10(3)/MCL (ref 0.6–4.6)
LYMPHOCYTES NFR BLD AUTO: 11.5 %
MAGNESIUM SERPL-MCNC: 1.6 MG/DL (ref 1.6–2.6)
MCH RBC QN AUTO: 29.5 PG
MCHC RBC AUTO-ENTMCNC: 32.2 G/DL (ref 33–36)
MCV RBC AUTO: 91.7 FL (ref 80–94)
MONOCYTES # BLD AUTO: 1.21 X10(3)/MCL (ref 0.1–1.3)
MONOCYTES NFR BLD AUTO: 8 %
NEUTROPHILS # BLD AUTO: 11.85 X10(3)/MCL (ref 2.1–9.2)
NEUTROPHILS NFR BLD AUTO: 78.7 %
NRBC BLD AUTO-RTO: 0 %
PHOSPHATE SERPL-MCNC: 8 MG/DL (ref 2.3–4.7)
PLATELET # BLD AUTO: 332 X10(3)/MCL (ref 130–400)
PMV BLD AUTO: 11.1 FL (ref 7.4–10.4)
POCT GLUCOSE: 189 MG/DL (ref 70–110)
POCT GLUCOSE: 198 MG/DL (ref 70–110)
POCT GLUCOSE: 199 MG/DL (ref 70–110)
POCT GLUCOSE: 97 MG/DL (ref 70–110)
POTASSIUM SERPL-SCNC: 5.1 MMOL/L (ref 3.5–5.1)
PROT SERPL-MCNC: 7.5 GM/DL (ref 5.8–7.6)
RBC # BLD AUTO: 2.88 X10(6)/MCL (ref 4.2–5.4)
SODIUM SERPL-SCNC: 138 MMOL/L (ref 136–145)
WBC # SPEC AUTO: 15.1 X10(3)/MCL (ref 4.5–11.5)

## 2023-03-21 PROCEDURE — 63600175 PHARM REV CODE 636 W HCPCS

## 2023-03-21 PROCEDURE — 21400001 HC TELEMETRY ROOM

## 2023-03-21 PROCEDURE — 97165 OT EVAL LOW COMPLEX 30 MIN: CPT

## 2023-03-21 PROCEDURE — 97161 PT EVAL LOW COMPLEX 20 MIN: CPT

## 2023-03-21 PROCEDURE — 84100 ASSAY OF PHOSPHORUS: CPT

## 2023-03-21 PROCEDURE — 85025 COMPLETE CBC W/AUTO DIFF WBC: CPT

## 2023-03-21 PROCEDURE — 80074 ACUTE HEPATITIS PANEL: CPT

## 2023-03-21 PROCEDURE — 80053 COMPREHEN METABOLIC PANEL: CPT

## 2023-03-21 PROCEDURE — 83735 ASSAY OF MAGNESIUM: CPT

## 2023-03-21 PROCEDURE — 25000003 PHARM REV CODE 250: Performed by: INTERNAL MEDICINE

## 2023-03-21 PROCEDURE — 25000003 PHARM REV CODE 250

## 2023-03-21 RX ORDER — HYDRALAZINE HYDROCHLORIDE 25 MG/1
25 TABLET, FILM COATED ORAL EVERY 8 HOURS
Status: DISCONTINUED | OUTPATIENT
Start: 2023-03-21 | End: 2023-03-23 | Stop reason: HOSPADM

## 2023-03-21 RX ORDER — METOPROLOL SUCCINATE 25 MG/1
25 TABLET, EXTENDED RELEASE ORAL ONCE
Status: COMPLETED | OUTPATIENT
Start: 2023-03-21 | End: 2023-03-21

## 2023-03-21 RX ORDER — LABETALOL 100 MG/1
100 TABLET, FILM COATED ORAL EVERY 12 HOURS
Status: DISCONTINUED | OUTPATIENT
Start: 2023-03-21 | End: 2023-03-22

## 2023-03-21 RX ORDER — MUPIROCIN 20 MG/G
OINTMENT TOPICAL 2 TIMES DAILY
Status: DISCONTINUED | OUTPATIENT
Start: 2023-03-21 | End: 2023-03-23 | Stop reason: HOSPADM

## 2023-03-21 RX ORDER — AMLODIPINE BESYLATE 10 MG/1
10 TABLET ORAL DAILY
Status: DISCONTINUED | OUTPATIENT
Start: 2023-03-22 | End: 2023-03-23 | Stop reason: HOSPADM

## 2023-03-21 RX ORDER — BUMETANIDE 0.25 MG/ML
2 INJECTION INTRAMUSCULAR; INTRAVENOUS EVERY 12 HOURS
Status: DISCONTINUED | OUTPATIENT
Start: 2023-03-21 | End: 2023-03-23 | Stop reason: HOSPADM

## 2023-03-21 RX ORDER — SODIUM BICARBONATE 650 MG/1
1300 TABLET ORAL 2 TIMES DAILY
Status: DISCONTINUED | OUTPATIENT
Start: 2023-03-21 | End: 2023-03-23 | Stop reason: HOSPADM

## 2023-03-21 RX ORDER — METOPROLOL SUCCINATE 50 MG/1
50 TABLET, EXTENDED RELEASE ORAL 2 TIMES DAILY
Status: DISCONTINUED | OUTPATIENT
Start: 2023-03-21 | End: 2023-03-21

## 2023-03-21 RX ORDER — LIDOCAINE AND PRILOCAINE 25; 25 MG/G; MG/G
CREAM TOPICAL
Status: DISCONTINUED | OUTPATIENT
Start: 2023-03-21 | End: 2023-03-23 | Stop reason: HOSPADM

## 2023-03-21 RX ADMIN — BUMETANIDE 2 MG: 0.25 INJECTION INTRAMUSCULAR; INTRAVENOUS at 05:03

## 2023-03-21 RX ADMIN — METOPROLOL SUCCINATE 25 MG: 25 TABLET, EXTENDED RELEASE ORAL at 09:03

## 2023-03-21 RX ADMIN — AMLODIPINE BESYLATE 5 MG: 5 TABLET ORAL at 09:03

## 2023-03-21 RX ADMIN — SODIUM BICARBONATE 1300 MG: 650 TABLET ORAL at 08:03

## 2023-03-21 RX ADMIN — HYDRALAZINE HYDROCHLORIDE 25 MG: 25 TABLET ORAL at 09:03

## 2023-03-21 RX ADMIN — CALCITRIOL CAPSULES 0.25 MCG 0.5 MCG: 0.25 CAPSULE ORAL at 09:03

## 2023-03-21 RX ADMIN — HEPARIN SODIUM 7500 UNITS: 5000 INJECTION, SOLUTION INTRAVENOUS; SUBCUTANEOUS at 08:03

## 2023-03-21 RX ADMIN — CEFTRIAXONE SODIUM 1 G: 1 INJECTION, POWDER, FOR SOLUTION INTRAMUSCULAR; INTRAVENOUS at 08:03

## 2023-03-21 RX ADMIN — MUPIROCIN: 20 OINTMENT TOPICAL at 12:03

## 2023-03-21 RX ADMIN — HEPARIN SODIUM 7500 UNITS: 5000 INJECTION, SOLUTION INTRAVENOUS; SUBCUTANEOUS at 09:03

## 2023-03-21 RX ADMIN — MUPIROCIN: 20 OINTMENT TOPICAL at 08:03

## 2023-03-21 RX ADMIN — METOPROLOL SUCCINATE 25 MG: 25 TABLET, EXTENDED RELEASE ORAL at 12:03

## 2023-03-21 RX ADMIN — HYDRALAZINE HYDROCHLORIDE 25 MG: 25 TABLET ORAL at 02:03

## 2023-03-21 RX ADMIN — LIDOCAINE AND PRILOCAINE: 25; 25 CREAM TOPICAL at 02:03

## 2023-03-21 RX ADMIN — LABETALOL HYDROCHLORIDE 100 MG: 100 TABLET, FILM COATED ORAL at 05:03

## 2023-03-21 NOTE — PT/OT/SLP EVAL
Physical Therapy Evaluation and Discharge Note    Patient Name:  Veronica Arauz   MRN:  [id number]    Recommendations:     Discharge Recommendations: home  Discharge Equipment Recommendations: none   Barriers to discharge: None    Assessment:     Veronica Arauz is a 62 y.o. female admitted with a medical diagnosis of <principal problem not specified>. .  At this time, patient is functioning at their prior level of function and does not require further acute PT services.     Recent Surgery: * No surgery found *    1. Acute hypoxemic respiratory failure    2. SOB (shortness of breath)    3. Acute pulmonary edema    4. Hypervolemia, unspecified hypervolemia type    5. Chronic renal impairment, unspecified CKD stage    6. Anemia, unspecified type    7. Hypertension, unspecified type    8. Hypoglycemia    9. Chest pain        Plan:     During this hospitalization, patient does not require further acute PT services.  Please re-consult if situation changes.      Subjective     Chief Complaint: none  Patient/Family Comments/goals: return home and back to work  Pain/Comfort:  Pain Rating 1: 0/10    Patients cultural, spiritual, Hoahaoism conflicts given the current situation: no    Living Environment:  Pt. Lives in a house with 3 steps and bilat rails.   Prior to admission, patients level of function was independent.  Equipment used at home: none.  DME owned (not currently used): none.  Upon discharge, patient will have assistance from her family.    Objective:     Communicated with nurse Self prior to session.  Patient found supine with peripheral IV upon PT entry to room.    General Precautions: Standard, fall    Orthopedic Precautions:N/A   Braces: N/A  Respiratory Status: Room air    Exams:  Cognitive Exam:  Patient is oriented to Person, Place, Time, and Situation  Gross Motor Coordination:  WFL  RLE ROM: WNL  RLE Strength: WNL  LLE ROM: WNL  LLE Strength: WNL    Functional Mobility:  Bed Mobility:     Supine to Sit:  independence  Transfers:     Sit to Stand:  independence with no AD  Gait: Pt. Has a narrow KB and pt. Has upper trunk initiated sideshift. However pt. And her family said that this is her baseline. No loss of balance observed. Pt. Was able to stand on one leg x 7 sec bilat  Balance: good    Patient left ambulatory in room/lucio with call button in reach, nurse  notified, and sister and granddaughter present.    GOALS:   Multidisciplinary Problems       Physical Therapy Goals       Not on file                    History:     Past Medical History:   Diagnosis Date    Diabetes mellitus     Renal disorder        Past Surgical History:   Procedure Laterality Date    HYSTERECTOMY         Time Tracking:     PT Received On: 03/21/23  PT Start Time: 1237     PT Stop Time: 1252  PT Total Time (min): 15 min     Billable Minutes: Evaluation 15      03/21/2023

## 2023-03-21 NOTE — PLAN OF CARE
New consult to set up outpt dialysis, per daughter's request, Charo Glover [phone], referral submitted to [facility] via the admissions portal. Will follow.

## 2023-03-21 NOTE — PROGRESS NOTES
"History and Physical     Date of Admit: 3/20/2023  Current Hospital Day: 2     Subjective:      Brief HPI:  Veronica Arauz is a 62 y.o. female who  has a past medical history of Diabetes mellitus and Renal disorder.  She presented to [place] ED on 3/19/2023  with a primary complaint of generalized weakness, shakiness, and changes in speech. Was found to be hypoglycemic in the 30s. Sx improved after given juice and sandwich and glucagon. Incidentally found to have UTI and was given IV CTX for one dose and discharged home with outpatient macrobid which has not been filled because this morning patient had another hypoglycemic episode and was seen again in [place] given orange juice, sandwich and patient returned to baseline mentation. Although she was SOB with signs of volume overload at that time, was given lasix in the ED. There she also satted 74% on RA per triage note. Patient states that for the past 2 weeks she has been getting congestion, dry cough, post-nasal drip and mild dyspnea with exertion. Stated that her ASHER would resolved rapidly after resting. Reports sleeping in recliner because she has trouble breathing lying in bed. Does endorse leg swelling, foul smelling urine.  She denies any fever, chills, sinus pain, sore throat, N/V/D, abd pain, sputum production, dysuria, hematuria, CP, dysuria, frequency, hematuria.         Patient follows Dr. Camacho for nephrology. Has had a LUE AV fistula for "over 2 years" anticipating the need for dialysis although has not been used yet.    Prior records indicated patient is on trulicity but pt states she discontinued this herself a month ago. She is on amaryl which her pcp had recently reduced dose from 4 to 2 2/2 hypoglycemic episodes.     Interval hx:  NAEO. Pt reports she did urinate after getting lasix yesterday. No current complaints. Afebrile. Pending nephro consult/recs.     Past Medical History:   Diagnosis Date    Diabetes mellitus     Renal disorder  "       Past Surgical History:   Procedure Laterality Date    HYSTERECTOMY         Review of patient's allergies indicates:  No Known Allergies    Current Outpatient Medications   Medication Instructions    amLODIPine (NORVASC) 5 mg, Oral    calcitRIOL (ROCALTROL) 0.5 mcg, Oral    calcitRIOL (ROCALTROL) 1 mcg, Oral    glimepiride (AMARYL) 4 mg, Oral, Every morning    losartan (COZAAR) 50 mg, Oral    metoprolol succinate (TOPROL-XL) 25 mg, Oral    torsemide (DEMADEX) 40 mg, Oral    TRULICITY 1.5 mg/0.5 mL pen injector Subcutaneous        Family History    None         Tobacco Use    Smoking status: Never    Smokeless tobacco: Never   Substance and Sexual Activity    Alcohol use: Never    Drug use: Never    Sexual activity: Not on file        Review of Systems:  Review of Systems   Constitutional:  Negative for chills and fever.   HENT:  Positive for congestion. Negative for sinus pain and sore throat.         (+) post nasal drip   Respiratory:  Positive for cough and shortness of breath. Negative for hemoptysis and sputum production.    Cardiovascular:  Positive for leg swelling. Negative for chest pain and palpitations.   Gastrointestinal:  Negative for abdominal pain, nausea and vomiting.   Genitourinary:  Negative for flank pain and hematuria.   Skin:  Negative for itching and rash.   Neurological:  Negative for weakness and headaches.   Endo/Heme/Allergies:  Does not bruise/bleed easily.        Objective:     Vital Signs:  Vital Signs (Most Recent):  Temp: 98.2 °F (36.8 °C) (03/21/23 0355)  Pulse: 90 (03/21/23 0355)  Resp: 18 (03/21/23 0355)  BP: (!) 167/68 (03/20/23 2343)  SpO2: 95 % (03/21/23 0355)   Vital Signs (24h Range):  Temp:  [97.9 °F (36.6 °C)-98.4 °F (36.9 °C)] 98.2 °F (36.8 °C)  Pulse:  [87-99] 90  Resp:  [18-31] 18  SpO2:  [74 %-100 %] 95 %  BP: (164-193)/(68-99) 167/68   Body mass index is 41.12 kg/m².   No intake or output data in the 24 hours ending 03/21/23 0601    Physical Examination:  General:   Well developed, well nourished, no acute distress. Sitting in chair by bed wrapped in blankets.   Head: Normocephalic, atraumatic  ENT: PERRL, MMM. Pharynx is clear without erythema, exudates.No post-nasal drip. Uvula midline.   Neck: supple, normal ROM, no JVD  CVS:  RRR. S1 and S2 normal. No murmurs, rubs, or gallops. Regular peripheral pulses. 2-3+ edema to BLE. LUE with AV graft with palpable thrill and audible bruit.   Resp:  Lungs clear to auscultation bilaterally, no wheezes, rales, or rhonchi. Normal respiratory effort. Satting 95% on RA.   GI:  Abdomen soft, non-tender, non-distended, normoactive bowel sounds  MSK:  Full range of motion, no obvious deformities  Skin:  No rashes, ulcers, erythema  Neuro:  Alert and oriented x3, No focal neuro deficits, CNII-XII grossly intact  Psych:  Appropriate mood and affect     Laboratory:    Recent Labs   Lab 03/21/23  0348   WBC 15.1*   HGB 8.5*   HCT 26.4*      MCV 91.7   RDW 12.8           Recent Labs   Lab 03/20/23  1511   TROPONINI 0.036   .8*           Recent Labs   Lab 03/20/23  1511   TROPONINI 0.036   .8*       No results for input(s): CHOL, HDL, LDLCALC, TRIG, CHOLHDL in the last 168 hours. Recent Labs   Lab 03/20/23  1511 03/21/23  0348    138   K 4.8 5.1   CO2 14* 13*   BUN 87.6* 85.3*   CREATININE 6.64* 6.55*   CALCIUM 8.6 8.8   MG 2.90*  --    PHOS 7.7* 8.0*           Recent Labs   Lab 03/21/23  0348   ALBUMIN 3.0*   BILITOT 0.5   AST 20   ALKPHOS 115   ALT 20           Recent Labs   Lab 03/20/23  1511   IRON 36*   TIBC 186*   FERRITIN 542.32*         Recent Labs   Lab 03/20/23  1511   TSH 0.798   HGBA1C 4.8            Microbiology Data:  Microbiology Results (last 7 days)       Procedure Component Value Units Date/Time    Blood Culture [[id number]] Collected: 03/20/23 1941    Order Status: Resulted Specimen: Blood from Wrist, Right Updated: 03/20/23 1947    Blood Culture [[id number]] Collected: 03/20/23 1941    Order Status:  Resulted Specimen: Blood from Hand, Right Updated: 03/20/23 1946             Other Results:    Radiology:  Imaging Results              X-Ray Chest PA And Lateral (Final result)  Result time 03/20/23 16:07:30      Final result by Stoney Gutierrez MD (03/20/23 16:07:30)                   Impression:      Central vascular congestion with mild edema      Electronically signed by: Stoney Gutierrez MD  Date:    03/20/2023  Time:    16:07               Narrative:    EXAMINATION:  XR CHEST PA AND LATERAL    CLINICAL HISTORY:  Chest Pain;    COMPARISON:  07/14/2020    FINDINGS:  PA and lateral views of the chest show no focal consolidation, pneumothorax or pleural effusion.  Cardiac silhouette is enlarged.  There is central vascular congestion with prominent interstitial markings bilaterally.  Aorta is partially calcified.  No acute osseous findings.                                    X-Ray Chest PA And Lateral    Result Date: 3/20/2023  Central vascular congestion with mild edema Electronically signed by: Stoney Gutierrez MD Date:    03/20/2023 Time:    16:07       Current Medications:     Infusions:        Scheduled:   amLODIPine  5 mg Oral Daily    calcitRIOL  0.5 mcg Oral Daily    cefTRIAXone (ROCEPHIN) IVPB  1 g Intravenous Q24H    heparin (porcine)  7,500 Units Subcutaneous Q12H    hydrALAZINE  25 mg Oral Q12H    metoprolol succinate  25 mg Oral BID         PRN:   dextrose 10%    dextrose 10%    dextrose 10%    dextrose 10%    dextrose    dextrose    dextrose    dextrose    glucagon (human recombinant)    glucagon (human recombinant)    hydrALAZINE    insulin aspart U-100    labetalol    naloxone    sodium chloride 0.9%        Antibiotics and Day Number of Therapy:  Antibiotics (From admission, onward)      Start     Stop Route Frequency Ordered    03/20/23 1930  cefTRIAXone (ROCEPHIN) 1 g in dextrose 5 % in water (D5W) 5 % 50 mL IVPB (MB+)         -- IV Every 24 hours (non-standard times) 03/20/23 0675                  Assessment & Plan:     Acute hypoxemic respiratory failure - improved  Anion gap Metabolic acidosis -   Volume overload  - comfortable on RA satting 95%.   - ABG pH 7.24, pco2 34, po2 72, hco3 14.6.  - BNP elevated 262.8  - CXR showing central vascular congestion, pedal edema 2-3+ on exam.    - giving lasix 80 tonight.   - gap suspected 2/2 uremia from esrd. Gap is improving 13 today    UTI   Leukocytosis wbc = 16.7  - Given CTX at outside facility.   - UA does not appear infectious but patient was started on abx at outside facility.   - blood cultures x2 pending.   - Will continue CTX as patient has left shift. afebrile.     ESRD  Secondary hyperparathyroidism  - Nephrology consulted, appreciate the assistance. Pending further recs.   - 80 IV lasix given last night with urine output and slight improvement to Cr.  - pedal edema present  -  LUE fistula in place palpable thrill, audible bruit  - avoid nephrotoxic medications  - continue home calcitriol.     HTN urgency  - PRN labetalol, hydralazine ordered.   - increase home amlodipine 5mg to 10 mg  - held home losartan 2/2 acute kidney injury.   - increased home toprol 50 mg daily to BID.   - starting hydralazine 25 mg TID  - CTM and titrate as indicated.     DM2  - Hold home trulicity and glimepiride.   - Low dose SSI    Normocytyic anemia  Likely 2/2 chronic kidney disease. Iron and TIBC both low.     CODE STATUS: full  Access: piv  Antibiotics: ctx  Diet: diabetic, renal  DVT Prophylaxis: heparin 7500u BID   GI Prophylaxis: none  Fluids: none           Ronal Peñaloza DO  Internal Medicine Resident PGY-I

## 2023-03-21 NOTE — NURSING
Unable to perform hemodialysis due to non functioning AV fistula. AVF infiltrated upon cannulation. Nephrology notified.

## 2023-03-21 NOTE — PT/OT/SLP EVAL
Occupational Therapy   Evaluation and Discharge Note    Name: Veronica Arauz  MRN: [id number]  Admitting Diagnosis:   1. Acute hypoxemic respiratory failure    2. SOB (shortness of breath)    3. Acute pulmonary edema    4. Hypervolemia, unspecified hypervolemia type    5. Chronic renal impairment, unspecified CKD stage    6. Anemia, unspecified type    7. Hypertension, unspecified type    8. Hypoglycemia    9. Chest pain      Patient Active Problem List   Diagnosis    Body mass index (BMI) 40.0-44.9, adult    Diabetes mellitus    Hyperlipidemia    Hypertension    ESRD (end stage renal disease)      Recent Surgery: * No surgery found *      Recommendations:     Discharge Recommendations: home  Discharge Equipment Recommendations: none  Barriers to discharge:  None    Assessment:     Veronica Arauz is a 62 y.o. female with a medical diagnosis of see above. At this time, patient is functioning at their prior level of function and does not require further acute OT services.     Plan:     During this hospitalization, patient does not require further acute OT services.  Please re-consult if situation changes.    Plan of Care Reviewed with: patient, grandchild(jignesh), sibling    Subjective     Chief Complaint: none stated  Patient/Family Comments/goals: return home     Occupational Profile:  Living Environment: Pt lives alone in SS house with 2 steps and B HR and walk in shower .  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  Patient found HOB elevated with peripheral IV upon OT  entry to room.    General Precautions: Standard,    Orthopedic Precautions: N/A  Braces: N/A  Respiratory Status: Room air     Occupational Performance:    Bed Mobility:    Patient completed Supine to Sit with independence    Functional Mobility/Transfers:  Patient completed Sit <> Stand Transfer with independence  with  no assistive device   Patient completed Toilet Transfer Stand Pivot technique with independence with  no AD  Functional Mobility: independent ambulate short distances in room for basic self care tasks to toilet and lavatory without AD     Activities of Daily Living:  Grooming: independence standing at sink  Upper Body Dressing: independence    Lower Body Dressing: modified independence    Toileting: independence      Cognitive/Visual Perceptual:  Cognitive/Psychosocial Skills:     -       Oriented to: Person, Place, Time, and Situation   -       Follows Commands/attention:Follows multistep  commands  -       Safety awareness/insight to disability: intact     Physical Exam:  Balance: -       good standing balance during self care tasks   Sensation:    -       Intact  light/touch B UE and hands  Dominant hand: -       right  Upper Extremity Range of Motion:     -       Right Upper Extremity: WFL  -       Left Upper Extremity: WFL  Upper Extremity Strength:    -       Right Upper Extremity: WFL  -       Left Upper Extremity: WFL   Strength:    -       Right Upper Extremity: WFL  -       Left Upper Extremity: WFL  Fine Motor Coordination:    -       Intact  Left hand, finger to nose, Right hand, finger to nose, Left hand thumb/finger opposition skills, and Right hand thumb/finger opposition skills      Patient left ambulatory in room/lucio with all lines intact, call button in reach, nurse  notified, and granddaughter and sister  present    GOALS:   Multidisciplinary Problems       Occupational Therapy Goals       Not on file                    History:     Past Medical History:   Diagnosis Date     Diabetes mellitus     Renal disorder          Past Surgical History:   Procedure Laterality Date    HYSTERECTOMY         Time Tracking:     OT Date of Treatment: 03/21/23  OT Start Time: 1238  OT Stop Time: 1252  OT Total Time (min): 14 min    Billable Minutes:Evaluation 14 min     3/21/2023

## 2023-03-21 NOTE — PLAN OF CARE
Problem: Adult Inpatient Plan of Care  Goal: Plan of Care Review  Outcome: Ongoing, Progressing  Goal: Patient-Specific Goal (Individualized)  Outcome: Ongoing, Progressing  Goal: Absence of Hospital-Acquired Illness or Injury  Outcome: Ongoing, Progressing  Goal: Optimal Comfort and Wellbeing  Outcome: Ongoing, Progressing  Goal: Readiness for Transition of Care  Outcome: Ongoing, Progressing     Problem: Bariatric Environmental Safety  Goal: Safety Maintained with Care  Outcome: Ongoing, Progressing     Problem: Diabetes Comorbidity  Goal: Blood Glucose Level Within Targeted Range  Outcome: Ongoing, Progressing     Problem: Fluid Volume Excess  Goal: Fluid Balance  Outcome: Ongoing, Progressing

## 2023-03-21 NOTE — PLAN OF CARE
03/21/23 1035   Discharge Assessment   Confirmed/corrected address, phone number and insurance Yes   Confirmed Demographics Correct on Facesheet   Source of Information patient   When was your last doctors appointment?   (Tarun Mcclellan)   Reason For Admission Acute pulmonary edema, SOB, CP, HTN   People in Home alone   Facility Arrived From: [facility]   Do you expect to return to your current living situation? Yes   Do you have help at home or someone to help you manage your care at home? Yes   Who are your caregiver(s) and their phone number(s)? Daughter Charo Glover [phone]   Prior to hospitilization cognitive status: Alert/Oriented   Current cognitive status: Alert/Oriented   Equipment Currently Used at Home none   Do you currently have service(s) that help you manage your care at home? No   Do you take prescription medications? Yes   Do you have prescription coverage? Yes   Coverage BLUE CROSS BLUE SHIELD - BCBS OF [facility]   Do you have any problems affording any of your prescribed medications? No   Is the patient taking medications as prescribed? yes   Who is going to help you get home at discharge? Daughter Charo Glover [phone]   How do you get to doctors appointments? car, drives self   Are you on dialysis? No   Do you take coumadin? No   Discharge Plan A Home   DME Needed Upon Discharge  other (see comments)  (Pending PT/OT eval)   Discharge Plan discussed with: Patient;Adult children  (Charo Glover [phone])   Discharge Barriers Identified None   Physical Activity   On average, how many days per week do you engage in moderate to strenuous exercise (like a brisk walk)? 4 days   On average, how many minutes do you engage in exercise at this level? 20 min   Financial Resource Strain   How hard is it for you to pay for the very basics like food, housing, medical care, and heating? Not hard   Housing Stability   In the last 12 months, was there a time when you were not  able to pay the mortgage or rent on time? N   In the last 12 months, how many places have you lived? 1   Transportation Needs   In the past 12 months, has lack of transportation kept you from medical appointments or from getting medications? no   In the past 12 months, has lack of transportation kept you from meetings, work, or from getting things needed for daily living? No   Food Insecurity   Within the past 12 months, you worried that your food would run out before you got the money to buy more. Never true   Within the past 12 months, the food you bought just didn't last and you didn't have money to get more. Never true   Stress   Do you feel stress - tense, restless, nervous, or anxious, or unable to sleep at night because your mind is troubled all the time - these days? Not at all   Social Connections   In a typical week, how many times do you talk on the phone with family, friends, or neighbors? More than 3   How often do you get together with friends or relatives? More than 3   How often do you attend Rastafari or Jew services? Never   Do you belong to any clubs or organizations such as Rastafari groups, unions, fraternal or athletic groups, or school groups? No   How often do you attend meetings of the clubs or organizations you belong to? Never   Are you , , , , never , or living with a partner?    Alcohol Use   Q1: How often do you have a drink containing alcohol? Never   Q2: How many drinks containing alcohol do you have on a typical day when you are drinking? None   Q3: How often do you have six or more drinks on one occasion? Never

## 2023-03-21 NOTE — CONSULTS
Nephrology Consultation    Date of Consultation:  March 21, 2023    Consultation Requested By:  Dr.K. Peñaloza    Reason for Consultation:  Hemodialysis needs while hospitalized    History of Present Illness:  This is a 62 y.o. female presented to [facility] complaining of weakness and shortness of breath.  Oxygenation on room air was 74% per triage note at [facility].  Patient was noted to have significantly elevated renal indices with a BUN and creatinine of 87.6 and 6.64 respectively.  Bicarbonate was 14.  Patient was transferred for nephrology care.  Patient does have left forearm AV fistula.  Patient relates that she had this fistula for 2 years but is dialysis naive.  Patient's nephrologist is Dr. Camacho.  Renal service was consulted for evaluation and management of initiation of hemodialysis due to massive volume overload as well as severe metabolic acidosis      Review of patient's allergies indicates:  No Known Allergies    Review of systems: 12 point review of systems conducted, negative except as stated in the HPI.     Past Medical History: .  Stage 5 CKD, hypertension and anemia in chronic kidney disease  Past Medical History:   Diagnosis Date    Diabetes mellitus     Renal disorder        Procedure History:   Past Surgical History:   Procedure Laterality Date    HYSTERECTOMY         Family History: family history is not on file.    Social History:  reports that she has never smoked. She has never used smokeless tobacco. She reports that she does not drink alcohol and does not use drugs.    Home Medications:   Medications Prior to Admission   Medication Sig Dispense Refill Last Dose    amLODIPine (NORVASC) 5 MG tablet Take 5 mg by mouth.       calcitRIOL (ROCALTROL) 0.25 MCG Cap Take 0.5 mcg by mouth.       calcitRIOL (ROCALTROL) 0.5 MCG Cap Take 1 mcg by mouth.       glimepiride (AMARYL) 4 MG tablet Take 4 mg by mouth every morning.       losartan (COZAAR) 50 MG tablet Take 50 mg by mouth.        metoprolol succinate (TOPROL-XL) 25 MG 24 hr tablet Take 25 mg by mouth.       torsemide (DEMADEX) 20 MG Tab Take 40 mg by mouth.       TRULICITY 1.5 mg/0.5 mL pen injector Inject into the skin.   3/6/2023       Inpatient Medications:     Current Facility-Administered Medications:     [START ON 3/22/2023] amLODIPine tablet 10 mg, 10 mg, Oral, Daily, Ronal Stroda, DO    calcitRIOL capsule 0.5 mcg, 0.5 mcg, Oral, Daily, Ronal Stroda, DO, 0.5 mcg at 03/21/23 0922    cefTRIAXone (ROCEPHIN) 1 g in dextrose 5 % in water (D5W) 5 % 50 mL IVPB (MB+), 1 g, Intravenous, Q24H, Ronal Stroda, DO, Stopped at 03/20/23 2123    dextrose 10% bolus 125 mL 125 mL, 12.5 g, Intravenous, PRN, Ronal Stroda, DO    dextrose 10% bolus 125 mL 125 mL, 12.5 g, Intravenous, PRN, Ronal Stroda, DO    dextrose 10% bolus 250 mL 250 mL, 25 g, Intravenous, PRN, Ronal Stroda, DO    dextrose 10% bolus 250 mL 250 mL, 25 g, Intravenous, PRN, Ronal Stroda, DO    dextrose 40 % gel 15,000 mg, 15 g, Oral, PRN, Ronal Stroda, DO    dextrose 40 % gel 15,000 mg, 15 g, Oral, PRN, Ronal Stroda, DO    dextrose 40 % gel 30,000 mg, 30 g, Oral, PRN, Ronal Stroda, DO    dextrose 40 % gel 30,000 mg, 30 g, Oral, PRN, Ronal Stroda, DO    glucagon (human recombinant) injection 1 mg, 1 mg, Intramuscular, PRN, Ronal Stroda, DO    glucagon (human recombinant) injection 1 mg, 1 mg, Intramuscular, PRN, Ronal Stroda, DO    heparin (porcine) injection 7,500 Units, 7,500 Units, Subcutaneous, Q12H, Ronal Stroda, DO, 7,500 Units at 03/21/23 0923    hydrALAZINE injection 10 mg, 10 mg, Intravenous, Q8H PRN, Ronal Stroda, DO    hydrALAZINE tablet 25 mg, 25 mg, Oral, Q8H, Ronal Stroda, DO    insulin aspart U-100 injection 0-5 Units, 0-5 Units, Subcutaneous, QID (AC + HS) PRN, Ronal Stroda, DO    labetalol 20 mg/4 mL (5 mg/mL) IV syring, 10 mg, Intravenous, Q4H PRN, Ronal Bermanda, DO, 10 mg at 03/20/23 1809    LIDOcaine-prilocaine cream, , Topical (Top), PRN, Diana Hinds MD    metoprolol  succinate (TOPROL-XL) 24 hr tablet 25 mg, 25 mg, Oral, Once, Ronal Stroda, DO    metoprolol succinate (TOPROL-XL) 24 hr tablet 50 mg, 50 mg, Oral, BID, Ronal Stroda, DO    mupirocin 2 % ointment, , Nasal, BID, Diana Hinds MD    naloxone 0.4 mg/mL injection 0.02 mg, 0.02 mg, Intravenous, PRN, Ronal Stroda, DO    sodium chloride 0.9% flush 10 mL, 10 mL, Intravenous, Q12H PRN, Ronal Stroda, DO     Laboratory Data:   Recent Results (from the past 24 hour(s))   POCT glucose    Collection Time: 03/20/23 12:37 PM   Result Value Ref Range    POCT Glucose 105 70 - 110 mg/dL   POCT ARTERIAL BLOOD GAS    Collection Time: 03/20/23  3:10 PM   Result Value Ref Range    POC PH 7.24 (AA) 7.30 - 7.60    POC PCO2 34 (A) 35 - 45 mmHg    POC PO2 72 (A) 75 - 100 mmHg    POC HEMOGLOBIN 9.3 (A) 12 - 18 g/dL    POC SATURATED O2 91.0 90.0 - 100 %    POC O2Hb 92.8 (A) 94.0 - 100 %    POC COHb 1.6 (A) 0.50 - 1.5 %    POC MetHb 0.80 0.0 - 1.5 %    Correct Temperature (PH) 7.24 (AA) 7.30 - 7.60    Corrected Temperature (pCO2) 34 (A) 35 - 45 mmHg    Corrected Temperature (pO2) 72 (A) 75 - 100 mmHg    POC HCO3 14.6 (AA) 15.0 - mmol/l    Base Deficit -11.8 (A) -2.00 - 2.00 mmol/l    POC Temp 37.0 C    Specimen source Arterial sample     Performed By: DL.    Comprehensive metabolic panel    Collection Time: 03/20/23  3:11 PM   Result Value Ref Range    Sodium Level 137 136 - 145 mmol/L    Potassium Level 4.8 3.5 - 5.1 mmol/L    Chloride 109 (H) 98 - 107 mmol/L    Carbon Dioxide 14 (L) 23 - 31 mmol/L    Glucose Level 78 (L) 82 - 115 mg/dL    Blood Urea Nitrogen 87.6 (H) 9.8 - 20.1 mg/dL    Creatinine 6.64 (H) 0.55 - 1.02 mg/dL    Calcium Level Total 8.6 8.4 - 10.2 mg/dL    Protein Total 8.1 (H) 5.8 - 7.6 gm/dL    Albumin Level 3.2 (L) 3.4 - 4.8 g/dL    Globulin 4.9 (H) 2.4 - 3.5 gm/dL    Albumin/Globulin Ratio 0.7 (L) 1.1 - 2.0 ratio    Bilirubin Total 0.5 <=1.5 mg/dL    Alkaline Phosphatase 125 40 - 150 unit/L    Alanine Aminotransferase 21 0  - 55 unit/L    Aspartate Aminotransferase 20 5 - 34 unit/L    eGFR 7 mls/min/1.73/m2   TSH    Collection Time: 03/20/23  3:11 PM   Result Value Ref Range    Thyroid Stimulating Hormone 0.798 0.350 - 4.940 uIU/mL   Troponin I    Collection Time: 03/20/23  3:11 PM   Result Value Ref Range    Troponin-I 0.036 0.000 - 0.045 ng/mL   B-Type natriuretic peptide (BNP)    Collection Time: 03/20/23  3:11 PM   Result Value Ref Range    Natriuretic Peptide 262.8 (H) <=100.0 pg/mL   Magnesium    Collection Time: 03/20/23  3:11 PM   Result Value Ref Range    Magnesium Level 2.90 (H) 1.60 - 2.60 mg/dL   Phosphorus    Collection Time: 03/20/23  3:11 PM   Result Value Ref Range    Phosphorus Level 7.7 (H) 2.3 - 4.7 mg/dL   Hemoglobin A1C    Collection Time: 03/20/23  3:11 PM   Result Value Ref Range    Hemoglobin A1c 4.8 <=7.0 %    Estimated Average Glucose 91.1 mg/dL   CBC with Differential    Collection Time: 03/20/23  3:11 PM   Result Value Ref Range    WBC 16.7 (H) 4.5 - 11.5 x10(3)/mcL    RBC 2.99 (L) 4.20 - 5.40 x10(6)/mcL    Hgb 8.9 (L) 12.0 - 16.0 g/dL    Hct 27.3 (L) 37.0 - 47.0 %    MCV 91.3 80.0 - 94.0 fL    MCH 29.8 pg    MCHC 32.6 (L) 33.0 - 36.0 g/dL    RDW 12.6 11.5 - 17.0 %    Platelet 363 130 - 400 x10(3)/mcL    MPV 10.8 (H) 7.4 - 10.4 fL    Neut % 83.6 %    Lymph % 8.3 %    Mono % 7.0 %    Eos % 0.5 %    Basophil % 0.2 %    Lymph # 1.38 0.6 - 4.6 x10(3)/mcL    Neut # 13.96 (H) 2.1 - 9.2 x10(3)/mcL    Mono # 1.17 0.1 - 1.3 x10(3)/mcL    Eos # 0.09 0 - 0.9 x10(3)/mcL    Baso # 0.04 0 - 0.2 x10(3)/mcL    IG# 0.07 (H) 0 - 0.04 x10(3)/mcL    IG% 0.4 %    NRBC% 0.0 %   Iron and TIBC    Collection Time: 03/20/23  3:11 PM   Result Value Ref Range    Iron Binding Capacity Unsaturated 150 70 - 310 ug/dL    Iron Level 36 (L) 50 - 170 ug/dL    Transferrin 159 (L) 173 - 360 mg/dL    Iron Binding Capacity Total 186 (L) 250 - 450 ug/dL    Iron Saturation 19 (L) 20 - 50 %   Ferritin    Collection Time: 03/20/23  3:11 PM   Result  Value Ref Range    Ferritin Level 542.32 (H) 4.63 - 204.00 ng/mL   POCT glucose    Collection Time: 03/20/23  4:18 PM   Result Value Ref Range    POCT Glucose 92 70 - 110 mg/dL   COVID/FLU A&B PCR    Collection Time: 03/20/23  4:20 PM   Result Value Ref Range    Influenza A PCR Not Detected Not Detected    Influenza B PCR Not Detected Not Detected    SARS-CoV-2 PCR Not Detected Not Detected, Negative, Invalid   Urinalysis, Reflex to Urine Culture    Collection Time: 03/20/23  5:34 PM    Specimen: Urine   Result Value Ref Range    Color, UA Colorless (A) Yellow, Light-Yellow, Dark Yellow, Leandra, Straw    Appearance, UA Clear Clear    Specific Gravity, UA 1.010     pH, UA 5.5 5.0 - 8.5    Protein, UA 2+ (A) Negative mg/dL    Glucose, UA Normal Negative, Normal mg/dL    Ketones, UA Negative Negative mg/dL    Blood, UA 2+ (A) Negative unit/L    Bilirubin, UA Negative Negative mg/dL    Urobilinogen, UA Normal 0.2, 1.0, Normal mg/dL    Nitrites, UA Negative Negative    Leukocyte Esterase, UA Negative Negative unit/L    WBC, UA 0-5 None Seen, 0-2, 3-5, 0-5 /HPF    Bacteria, UA Trace (A) None Seen /HPF    Squamous Epithelial Cells, UA Trace (A) None Seen /HPF    Mucous, UA Trace (A) None Seen /LPF    Hyaline Casts, UA None Seen None Seen /lpf    RBC, UA 0-5 None Seen, 0-2, 3-5, 0-5 /HPF   POCT glucose    Collection Time: 03/20/23  8:09 PM   Result Value Ref Range    POCT Glucose 116 (H) 70 - 110 mg/dL   Comprehensive Metabolic Panel (CMP)    Collection Time: 03/21/23  3:48 AM   Result Value Ref Range    Sodium Level 138 136 - 145 mmol/L    Potassium Level 5.1 3.5 - 5.1 mmol/L    Chloride 111 (H) 98 - 107 mmol/L    Carbon Dioxide 13 (L) 23 - 31 mmol/L    Glucose Level 61 (L) 82 - 115 mg/dL    Blood Urea Nitrogen 85.3 (H) 9.8 - 20.1 mg/dL    Creatinine 6.55 (H) 0.55 - 1.02 mg/dL    Calcium Level Total 8.8 8.4 - 10.2 mg/dL    Protein Total 7.5 5.8 - 7.6 gm/dL    Albumin Level 3.0 (L) 3.4 - 4.8 g/dL    Globulin 4.5 (H) 2.4 -  3.5 gm/dL    Albumin/Globulin Ratio 0.7 (L) 1.1 - 2.0 ratio    Bilirubin Total 0.5 <=1.5 mg/dL    Alkaline Phosphatase 115 40 - 150 unit/L    Alanine Aminotransferase 20 0 - 55 unit/L    Aspartate Aminotransferase 20 5 - 34 unit/L    eGFR 7 mls/min/1.73/m2   Magnesium    Collection Time: 03/21/23  3:48 AM   Result Value Ref Range    Magnesium Level 1.60 1.60 - 2.60 mg/dL   Phosphorus    Collection Time: 03/21/23  3:48 AM   Result Value Ref Range    Phosphorus Level 8.0 (H) 2.3 - 4.7 mg/dL   CBC with Differential    Collection Time: 03/21/23  3:48 AM   Result Value Ref Range    WBC 15.1 (H) 4.5 - 11.5 x10(3)/mcL    RBC 2.88 (L) 4.20 - 5.40 x10(6)/mcL    Hgb 8.5 (L) 12.0 - 16.0 g/dL    Hct 26.4 (L) 37.0 - 47.0 %    MCV 91.7 80.0 - 94.0 fL    MCH 29.5 pg    MCHC 32.2 (L) 33.0 - 36.0 g/dL    RDW 12.8 11.5 - 17.0 %    Platelet 332 130 - 400 x10(3)/mcL    MPV 11.1 (H) 7.4 - 10.4 fL    Neut % 78.7 %    Lymph % 11.5 %    Mono % 8.0 %    Eos % 0.8 %    Basophil % 0.3 %    Lymph # 1.73 0.6 - 4.6 x10(3)/mcL    Neut # 11.85 (H) 2.1 - 9.2 x10(3)/mcL    Mono # 1.21 0.1 - 1.3 x10(3)/mcL    Eos # 0.12 0 - 0.9 x10(3)/mcL    Baso # 0.05 0 - 0.2 x10(3)/mcL    IG# 0.10 (H) 0 - 0.04 x10(3)/mcL    IG% 0.7 %    NRBC% 0.0 %   POCT glucose    Collection Time: 03/21/23  7:32 AM   Result Value Ref Range    POCT Glucose 97 70 - 110 mg/dL       Imaging:  X-Ray Chest PA And Lateral   Final Result      Central vascular congestion with mild edema         Electronically signed by: Stoney Gutierrez MD   Date:    03/20/2023   Time:    16:07      Xray Previous   Final Result          Physical Exam:   BP (!) 157/73   Pulse 90   Temp 97.6 °F (36.4 °C) (Oral)   Resp 18   Ht 5' (1.524 m)   Wt 95.5 kg (210 lb 8.6 oz)   SpO2 95%   Breastfeeding No   BMI 41.12 kg/m²  Body mass index is 41.12 kg/m².  General:  Patient is alert and oriented person place and time no acute distress  HEENT: Pupils equal round reactive to light conjunctiva are pale  bilaterally  Neck: No JVD bruit thyromegaly adenopathy appreciated  Lungs:  Coarse breath sounds at bases bilaterally  Cardiovascular: Regular rate and rhythm no murmur rub gallop appreciated  Abdomen: Positive bowel sounds all 4 quadrants soft nontender nondistended  Extremities:  1+ pitting edema of lower extremities up to sacrum.  There is a positive thrill and bruit to av fistula in left forearm.  Neurologic:  Cranial nerves 2-12 grossly intact no clonus asterixis noted    Impression/Plan:  New onset end-stage renal disease:  Patient has agreed to hemodialysis.  She does have a thrill and bruit in left AV fistula.  I have ordered 17 gauge needles and a 2 hour hemodialysis treatment.  We have also ordered EMLA cream to be applied to AV fistula site.  Patient will have hemodialysis today and hemodialysis 3 hours tomorrow and a 3rd treatment on Thursday.  Will ask  for outpatient hemodialysis placement.  Patient daughter is a nurse and they are interested in home hemodialysis as well.  Severe metabolic acidosis:  Patient is on sodium bicarbonate tablets will use 35 bicarbonate bath with dialysis today.  Will recheck CMP in a.m. to further assess acid-base status.  Anemia in chronic kidney disease:  Will check iron studies.  Patient will likely benefit from Epogen therapy.  Hyperphosphatemia:  Will likely need phosphorus binder.  Will recheck phosphorus level after hemodialysis tomorrow.  Hypertension:  Part may be partially volume related.  Will reassess after hemodialysis today and volume removal.  Edema:  Attempt to remove 1-2 L as tolerated.  7.  Hypoglycemia: hold glimiperide as renally cleared.    Thank you very much for your consultation    Diana Hinds M.D.  Nephrology      Addendum:  AV fistula infiltrated upon cannulation. No HD accomplished today. Will consult vascular surgery for evaluation.    Will begin sodium bicarbonate tablets 1300 mg po bid and labetalol 100 po bid and  discontinue metoprolol for better blood pressure control. Bumex 2 gm IV q 12 for attempted diuresis.    Diana Hinds M.D.  Nephrology

## 2023-03-21 NOTE — PROGRESS NOTES
Inpatient Nutrition Assessment    Admit Date: 3/20/2023   Total duration of encounter: 1 day     Nutrition Recommendation/Prescription     Renal, Diabetic diet  Monitor Weights with HD    Communication of Recommendations: reviewed with patient    Nutrition Assessment     Malnutrition Assessment/Nutrition-Focused Physical Exam    Malnutrition in the context of acute illness or injury  Degree of Malnutrition: does not meet criteria  Energy Intake: does not meet criteria  Interpretation of Weight Loss: does not meet criteria  Body Fat:well nourished  Area of Body Fat Loss: does not meet criteria  Muscle Mass Loss: well nourished  Area of Muscle Mass Loss: does not meet criteria  Fluid Accumulation: mild  Edema: 1+ edema - trace   Reduced  Strength: unable to obtain  A minimum of two characteristics is recommended for diagnosis of either severe or non-severe malnutrition.    Chart Review    Reason Seen: continuous nutrition monitoring    Malnutrition Screening Tool Results   Have you recently lost weight without trying?: No  Have you been eating poorly because of a decreased appetite?: No   MST Score: 0     Diagnosis:  Acute Hypoxemic Respiratory Failure, AGMA, Volume overload, UTI, Leukocytosis, new onset ESRD, secondary Hyperparathyroidism, HTN urgency, DM    Relevant Medical History: DM, Renal disorder    Nutrition-Related Medications: Amlodipine, Calcitriol, Rocephin, ISS  Calorie Containing IV Medications: no significant kcals from medications at this time    Nutrition-Related Labs:  3/21/23 -- H/H 8.5/26 L, Glu 61 L, K 5.1, BUN 85 H, Cr 6.5 H, Phos 8 H, Hgb A1C 4.8    Diet/PN Order: DIET RENAL NON-DIALYSIS Diabetic  Oral Supplement Order: none  Tube Feeding Order: none  Appetite/Oral Intake: good/% of meals  Factors Affecting Nutritional Intake: none identified  Food/Pentecostal/Cultural Preferences: none reported  Food Allergies: none reported       Wound(s):   skin intact    Comments    3/21/23 -- Pt  reports good appetite, denies difficulty eating; no n/v, LBM documented on 3/20; BUN/Cr/Phos (H) -  new onset ESRD, plan for HD; pt denies wt loss, reporting  lb    Anthropometrics    Height: 5' (152.4 cm)    Last Weight: 95.5 kg (210 lb 8.6 oz) (23 1233) Weight Method: Bed Scale  BMI (Calculated): 41.1  BMI Classification: obese grade III (BMI >/=40)     Ideal Body Weight (IBW), Female: 100 lb     % Ideal Body Weight, Female (lb): 210.54 %                    Usual Body Weight (UBW), k.4 kg  % Usual Body Weight: 105.86     Usual Weight Provided By: patient    Wt Readings from Last 5 Encounters:   23 95.5 kg (210 lb 8.6 oz)     Weight Change(s) Since Admission:  Admit Weight: 95.5 kg (210 lb 8.6 oz) (23 1233)      Estimated Needs    Weight Used For Calorie Calculations: 95 kg (209 lb 7 oz)  Energy Calorie Requirements (kcal): 2808-8309 kcal (21 - 22 kcal/kg)  Energy Need Method: Kcal/kg  Weight Used For Protein Calculations: 95 kg (209 lb 7 oz)  Protein Requirements: 76-86 gm (0.8-0.9 gm/kg)  Fluid Requirements (mL): 1895-7726 ml (1ml/kcal)  Temp (24hrs), Av.2 °F (36.8 °C), Min:97.6 °F (36.4 °C), Max:99 °F (37.2 °C)         Enteral Nutrition    Patient not receiving enteral nutrition at this time.    Parenteral Nutrition    Patient not receiving parenteral nutrition support at this time.    Evaluation of Received Nutrient Intake    Calories: meeting estimated needs  Protein: meeting estimated needs    Patient Education    Not applicable.    Nutrition Diagnosis     PES: Altered nutrition-related laboratory values related to new onset ESRD as evidenced by elevated BUN/Cr/Phos. (new)    Interventions/Goals     Intervention(s): modified composition of meals/snacks and collaboration with other providers  Goal: Meet greater than 75% of nutritional needs by follow-up. (new)    Monitoring & Evaluation     Dietitian will monitor food and beverage intake, weight change, electrolyte/renal  panel, and glucose/endocrine profile.  Nutrition Risk/Follow-Up: moderate (follow-up in 3-5 days)   Please consult if re-assessment needed sooner.

## 2023-03-22 VITALS
RESPIRATION RATE: 16 BRPM | BODY MASS INDEX: 41.34 KG/M2 | DIASTOLIC BLOOD PRESSURE: 72 MMHG | TEMPERATURE: 98 F | HEIGHT: 60 IN | HEART RATE: 83 BPM | WEIGHT: 210.56 LBS | OXYGEN SATURATION: 98 % | SYSTOLIC BLOOD PRESSURE: 153 MMHG

## 2023-03-22 PROBLEM — R06.02 SOB (SHORTNESS OF BREATH): Status: RESOLVED | Noted: 2023-03-22 | Resolved: 2023-03-22

## 2023-03-22 PROBLEM — R06.02 SOB (SHORTNESS OF BREATH): Status: ACTIVE | Noted: 2023-03-22

## 2023-03-22 LAB
ABO + RH BLD: NORMAL
ABORH RETYPE: NORMAL
ALBUMIN SERPL-MCNC: 2.8 G/DL (ref 3.4–4.8)
ALBUMIN/GLOB SERPL: 0.7 RATIO (ref 1.1–2)
ALP SERPL-CCNC: 108 UNIT/L (ref 40–150)
ALT SERPL-CCNC: 18 UNIT/L (ref 0–55)
AST SERPL-CCNC: 18 UNIT/L (ref 5–34)
BASOPHILS # BLD AUTO: 0.05 X10(3)/MCL (ref 0–0.2)
BASOPHILS NFR BLD AUTO: 0.4 %
BILIRUBIN DIRECT+TOT PNL SERPL-MCNC: 0.3 MG/DL
BLD PROD TYP BPU: NORMAL
BLOOD UNIT EXPIRATION DATE: NORMAL
BLOOD UNIT TYPE CODE: 5100
BUN SERPL-MCNC: 87.1 MG/DL (ref 9.8–20.1)
CALCIUM SERPL-MCNC: 8.8 MG/DL (ref 8.4–10.2)
CHLORIDE SERPL-SCNC: 111 MMOL/L (ref 98–107)
CO2 SERPL-SCNC: 14 MMOL/L (ref 23–31)
CREAT SERPL-MCNC: 6.89 MG/DL (ref 0.55–1.02)
CROSSMATCH INTERPRETATION: NORMAL
DISPENSE STATUS: NORMAL
EOSINOPHIL # BLD AUTO: 0.2 X10(3)/MCL (ref 0–0.9)
EOSINOPHIL NFR BLD AUTO: 1.5 %
ERYTHROCYTE [DISTWIDTH] IN BLOOD BY AUTOMATED COUNT: 12.8 % (ref 11.5–17)
GFR SERPLBLD CREATININE-BSD FMLA CKD-EPI: 6 MLS/MIN/1.73/M2
GLOBULIN SER-MCNC: 4.3 GM/DL (ref 2.4–3.5)
GLUCOSE SERPL-MCNC: 179 MG/DL (ref 82–115)
GROUP & RH: NORMAL
HCT VFR BLD AUTO: 22.8 % (ref 37–47)
HGB BLD-MCNC: 7.7 G/DL (ref 12–16)
IMM GRANULOCYTES # BLD AUTO: 0.1 X10(3)/MCL (ref 0–0.04)
IMM GRANULOCYTES NFR BLD AUTO: 0.7 %
INDIRECT COOMBS GEL: NORMAL
LYMPHOCYTES # BLD AUTO: 1.65 X10(3)/MCL (ref 0.6–4.6)
LYMPHOCYTES NFR BLD AUTO: 12.2 %
MAGNESIUM SERPL-MCNC: 1.8 MG/DL (ref 1.6–2.6)
MCH RBC QN AUTO: 30.4 PG
MCHC RBC AUTO-ENTMCNC: 33.8 G/DL (ref 33–36)
MCV RBC AUTO: 90.1 FL (ref 80–94)
MONOCYTES # BLD AUTO: 1.17 X10(3)/MCL (ref 0.1–1.3)
MONOCYTES NFR BLD AUTO: 8.7 %
NEUTROPHILS # BLD AUTO: 10.31 X10(3)/MCL (ref 2.1–9.2)
NEUTROPHILS NFR BLD AUTO: 76.5 %
NRBC BLD AUTO-RTO: 0 %
PATH REV: NORMAL
PHOSPHATE SERPL-MCNC: 7.6 MG/DL (ref 2.3–4.7)
PLATELET # BLD AUTO: 322 X10(3)/MCL (ref 130–400)
PMV BLD AUTO: 11.4 FL (ref 7.4–10.4)
POCT GLUCOSE: 194 MG/DL (ref 70–110)
POCT GLUCOSE: 207 MG/DL (ref 70–110)
POCT GLUCOSE: 272 MG/DL (ref 70–110)
POTASSIUM SERPL-SCNC: 5.1 MMOL/L (ref 3.5–5.1)
PROT SERPL-MCNC: 7.1 GM/DL (ref 5.8–7.6)
RBC # BLD AUTO: 2.53 X10(6)/MCL (ref 4.2–5.4)
SODIUM SERPL-SCNC: 138 MMOL/L (ref 136–145)
SPECIMEN OUTDATE: NORMAL
UNIT NUMBER: NORMAL
WBC # SPEC AUTO: 13.5 X10(3)/MCL (ref 4.5–11.5)

## 2023-03-22 PROCEDURE — 36430 TRANSFUSION BLD/BLD COMPNT: CPT

## 2023-03-22 PROCEDURE — 21400001 HC TELEMETRY ROOM

## 2023-03-22 PROCEDURE — 25000003 PHARM REV CODE 250: Performed by: NURSE PRACTITIONER

## 2023-03-22 PROCEDURE — 25000003 PHARM REV CODE 250

## 2023-03-22 PROCEDURE — 63600175 PHARM REV CODE 636 W HCPCS: Performed by: NURSE PRACTITIONER

## 2023-03-22 PROCEDURE — P9016 RBC LEUKOCYTES REDUCED: HCPCS

## 2023-03-22 PROCEDURE — 63600175 PHARM REV CODE 636 W HCPCS

## 2023-03-22 PROCEDURE — 63600175 PHARM REV CODE 636 W HCPCS: Mod: JZ,JG | Performed by: NURSE PRACTITIONER

## 2023-03-22 PROCEDURE — 25000003 PHARM REV CODE 250: Performed by: INTERNAL MEDICINE

## 2023-03-22 PROCEDURE — 86900 BLOOD TYPING SEROLOGIC ABO: CPT | Performed by: INTERNAL MEDICINE

## 2023-03-22 PROCEDURE — 80053 COMPREHEN METABOLIC PANEL: CPT

## 2023-03-22 PROCEDURE — 84100 ASSAY OF PHOSPHORUS: CPT

## 2023-03-22 PROCEDURE — 94761 N-INVAS EAR/PLS OXIMETRY MLT: CPT

## 2023-03-22 PROCEDURE — 86920 COMPATIBILITY TEST SPIN: CPT

## 2023-03-22 PROCEDURE — 83735 ASSAY OF MAGNESIUM: CPT

## 2023-03-22 PROCEDURE — 85025 COMPLETE CBC W/AUTO DIFF WBC: CPT

## 2023-03-22 RX ORDER — HYDROCODONE BITARTRATE AND ACETAMINOPHEN 500; 5 MG/1; MG/1
TABLET ORAL
Status: DISCONTINUED | OUTPATIENT
Start: 2023-03-22 | End: 2023-03-23 | Stop reason: HOSPADM

## 2023-03-22 RX ORDER — LABETALOL 100 MG/1
100 TABLET, FILM COATED ORAL 2 TIMES DAILY
Qty: 60 TABLET | Refills: 11 | Status: ON HOLD | OUTPATIENT
Start: 2023-03-22 | End: 2023-09-08 | Stop reason: HOSPADM

## 2023-03-22 RX ORDER — FUROSEMIDE 80 MG/1
80 TABLET ORAL 2 TIMES DAILY
Qty: 60 TABLET | Refills: 11 | Status: SHIPPED | OUTPATIENT
Start: 2023-03-22 | End: 2024-03-21

## 2023-03-22 RX ORDER — SODIUM BICARBONATE 650 MG/1
1300 TABLET ORAL 2 TIMES DAILY
Qty: 120 TABLET | Refills: 11 | Status: SHIPPED | OUTPATIENT
Start: 2023-03-22 | End: 2024-03-21

## 2023-03-22 RX ORDER — LABETALOL 100 MG/1
200 TABLET, FILM COATED ORAL EVERY 12 HOURS
Status: DISCONTINUED | OUTPATIENT
Start: 2023-03-22 | End: 2023-03-23 | Stop reason: HOSPADM

## 2023-03-22 RX ADMIN — CEFTRIAXONE SODIUM 1 G: 1 INJECTION, POWDER, FOR SOLUTION INTRAMUSCULAR; INTRAVENOUS at 11:03

## 2023-03-22 RX ADMIN — CALCITRIOL CAPSULES 0.25 MCG 0.5 MCG: 0.25 CAPSULE ORAL at 09:03

## 2023-03-22 RX ADMIN — SODIUM BICARBONATE 1300 MG: 650 TABLET ORAL at 09:03

## 2023-03-22 RX ADMIN — SODIUM CHLORIDE 125 MG: 9 INJECTION, SOLUTION INTRAVENOUS at 09:03

## 2023-03-22 RX ADMIN — INSULIN ASPART 3 UNITS: 100 INJECTION, SOLUTION INTRAVENOUS; SUBCUTANEOUS at 12:03

## 2023-03-22 RX ADMIN — EPOETIN ALFA-EPBX 10000 UNITS: 10000 INJECTION, SOLUTION INTRAVENOUS; SUBCUTANEOUS at 11:03

## 2023-03-22 RX ADMIN — HEPARIN SODIUM 7500 UNITS: 5000 INJECTION, SOLUTION INTRAVENOUS; SUBCUTANEOUS at 09:03

## 2023-03-22 RX ADMIN — AMLODIPINE BESYLATE 10 MG: 10 TABLET ORAL at 09:03

## 2023-03-22 RX ADMIN — MUPIROCIN: 20 OINTMENT TOPICAL at 09:03

## 2023-03-22 RX ADMIN — BUMETANIDE 2 MG: 0.25 INJECTION INTRAMUSCULAR; INTRAVENOUS at 09:03

## 2023-03-22 RX ADMIN — LABETALOL HYDROCHLORIDE 200 MG: 100 TABLET, FILM COATED ORAL at 09:03

## 2023-03-22 RX ADMIN — HYDRALAZINE HYDROCHLORIDE 25 MG: 25 TABLET ORAL at 05:03

## 2023-03-22 NOTE — PROGRESS NOTES
Nephrology Progress Note    Hospital course:   62-year-old  female with new onset end-stage renal disease was transferred from [facility] for nephrology care.  Attempt was made to access left forearm AV fistula and it infiltrated.  Today there is swelling around the area.  Patient is stable and has no shortness of breath that has improved since admission.  Patient has an appointment outpatient with Dr. Rendon her vascular surgeon and with Dr. Waterman at 2:00 p.m. on Monday.  I have asked that the records from this hospitalization be faxed to Dr. Waterman's office per patient and patient's daughter request.  Patient and her daughter requesting the services of Dr. Waterman patient's daughter works for Dr. Waterman in the past.    Subjective:   Patient relates feeling much better today.    Twelve point review of systems:  Positive for resolving shortness of breath.  Otherwise negative.    Objective:   BP (!) 147/72   Pulse 86   Temp 98.6 °F (37 °C) (Oral)   Resp 18   Ht 5' (1.524 m)   Wt 95.5 kg (210 lb 8.6 oz)   SpO2 96%   Breastfeeding No   BMI 41.12 kg/m²     Intake/Output Summary (Last 24 hours) at 3/22/2023 0954  Last data filed at 3/22/2023 0516  Gross per 24 hour   Intake 300 ml   Output --   Net 300 ml        Physical exam:   General:  Alert and oriented person place time no acute distress  HEENT:  Normocephalic atraumatic pupils equal round reactive to light  And accommodation   lungs: Clear to auscultation bilaterally all fields  Cardiovascular: Regular rate and rhythm no murmur rub gallop appreciated  Abdomen: Positive bowel sounds all 4 quadrants soft nontender nondistended  Extremities:  No clubbing cyanosis noted 1+ edema of lower extremities bilaterally  Neurologic: No clonus asterixis appreciated cranial nerves 2-12 grossly intact  Laboratory data:   Recent Results (from the past 24 hour(s))   POCT glucose    Collection Time: 03/21/23 12:01 PM   Result Value Ref Range    POCT Glucose  189 (H) 70 - 110 mg/dL   POCT glucose    Collection Time: 03/21/23  2:55 PM   Result Value Ref Range    POCT Glucose 198 (H) 70 - 110 mg/dL   POCT glucose    Collection Time: 03/21/23  8:25 PM   Result Value Ref Range    POCT Glucose 199 (H) 70 - 110 mg/dL   Comprehensive Metabolic Panel (CMP)    Collection Time: 03/22/23  3:47 AM   Result Value Ref Range    Sodium Level 138 136 - 145 mmol/L    Potassium Level 5.1 3.5 - 5.1 mmol/L    Chloride 111 (H) 98 - 107 mmol/L    Carbon Dioxide 14 (L) 23 - 31 mmol/L    Glucose Level 179 (H) 82 - 115 mg/dL    Blood Urea Nitrogen 87.1 (H) 9.8 - 20.1 mg/dL    Creatinine 6.89 (H) 0.55 - 1.02 mg/dL    Calcium Level Total 8.8 8.4 - 10.2 mg/dL    Protein Total 7.1 5.8 - 7.6 gm/dL    Albumin Level 2.8 (L) 3.4 - 4.8 g/dL    Globulin 4.3 (H) 2.4 - 3.5 gm/dL    Albumin/Globulin Ratio 0.7 (L) 1.1 - 2.0 ratio    Bilirubin Total 0.3 <=1.5 mg/dL    Alkaline Phosphatase 108 40 - 150 unit/L    Alanine Aminotransferase 18 0 - 55 unit/L    Aspartate Aminotransferase 18 5 - 34 unit/L    eGFR 6 mls/min/1.73/m2   Magnesium    Collection Time: 03/22/23  3:47 AM   Result Value Ref Range    Magnesium Level 1.80 1.60 - 2.60 mg/dL   Phosphorus    Collection Time: 03/22/23  3:47 AM   Result Value Ref Range    Phosphorus Level 7.6 (H) 2.3 - 4.7 mg/dL   CBC with Differential    Collection Time: 03/22/23  3:47 AM   Result Value Ref Range    WBC 13.5 (H) 4.5 - 11.5 x10(3)/mcL    RBC 2.53 (L) 4.20 - 5.40 x10(6)/mcL    Hgb 7.7 (L) 12.0 - 16.0 g/dL    Hct 22.8 (L) 37.0 - 47.0 %    MCV 90.1 80.0 - 94.0 fL    MCH 30.4 pg    MCHC 33.8 33.0 - 36.0 g/dL    RDW 12.8 11.5 - 17.0 %    Platelet 322 130 - 400 x10(3)/mcL    MPV 11.4 (H) 7.4 - 10.4 fL    Neut % 76.5 %    Lymph % 12.2 %    Mono % 8.7 %    Eos % 1.5 %    Basophil % 0.4 %    Lymph # 1.65 0.6 - 4.6 x10(3)/mcL    Neut # 10.31 (H) 2.1 - 9.2 x10(3)/mcL    Mono # 1.17 0.1 - 1.3 x10(3)/mcL    Eos # 0.20 0 - 0.9 x10(3)/mcL    Baso # 0.05 0 - 0.2 x10(3)/mcL    IG#  0.10 (H) 0 - 0.04 x10(3)/mcL    IG% 0.7 %    NRBC% 0.0 %   POCT glucose    Collection Time: 03/22/23  7:20 AM   Result Value Ref Range    POCT Glucose 194 (H) 70 - 110 mg/dL       Imaging:   Imaging Results              X-Ray Chest PA And Lateral (Final result)  Result time 03/20/23 16:07:30      Final result by Stoney Gutierrez MD (03/20/23 16:07:30)                   Impression:      Central vascular congestion with mild edema      Electronically signed by: Stoney Gutierrez MD  Date:    03/20/2023  Time:    16:07               Narrative:    EXAMINATION:  XR CHEST PA AND LATERAL    CLINICAL HISTORY:  Chest Pain;    COMPARISON:  07/14/2020    FINDINGS:  PA and lateral views of the chest show no focal consolidation, pneumothorax or pleural effusion.  Cardiac silhouette is enlarged.  There is central vascular congestion with prominent interstitial markings bilaterally.  Aorta is partially calcified.  No acute osseous findings.                                       Medications:     Current Facility-Administered Medications:     amLODIPine tablet 10 mg, 10 mg, Oral, Daily, Ronal Stroda, DO, 10 mg at 03/22/23 0926    bumetanide injection 2 mg, 2 mg, Intravenous, Q12H, Diana Hinds MD, 2 mg at 03/22/23 0929    calcitRIOL capsule 0.5 mcg, 0.5 mcg, Oral, Daily, Ronal Stroda, DO, 0.5 mcg at 03/22/23 0924    cefTRIAXone (ROCEPHIN) 1 g in dextrose 5 % in water (D5W) 5 % 50 mL IVPB (MB+), 1 g, Intravenous, Q24H, Ronal Stroda, DO, Stopped at 03/21/23 2124    dextrose 10% bolus 125 mL 125 mL, 12.5 g, Intravenous, PRN, Ronal Stroda, DO    dextrose 10% bolus 125 mL 125 mL, 12.5 g, Intravenous, PRN, Ronal Stroda, DO    dextrose 10% bolus 250 mL 250 mL, 25 g, Intravenous, PRN, Ronal Stroda, DO    dextrose 10% bolus 250 mL 250 mL, 25 g, Intravenous, PRN, Ronal Stroda, DO    dextrose 40 % gel 15,000 mg, 15 g, Oral, PRN, Ronal Stroda, DO    dextrose 40 % gel 15,000 mg, 15 g, Oral, PRN, Ronal Peñaloza,     dextrose 40 % gel 30,000 mg, 30 g,  Oral, PRN, Ronal Stroda, DO    dextrose 40 % gel 30,000 mg, 30 g, Oral, PRN, Ronal Stroda, DO    epoetin david-epbx injection 10,000 Units, 10,000 Units, Subcutaneous, Once, Sarah Hoffman, FNP    ferric gluconate (FERRLECIT) 125 mg in sodium chloride 0.9% 100 mL IVPB, 125 mg, Intravenous, Daily, Sarah Hoffman, FNP, Last Rate: 100 mL/hr at 03/22/23 0930, 125 mg at 03/22/23 0930    glucagon (human recombinant) injection 1 mg, 1 mg, Intramuscular, PRN, Ronal Stroda, DO    glucagon (human recombinant) injection 1 mg, 1 mg, Intramuscular, PRN, Ronal Stroda, DO    heparin (porcine) injection 7,500 Units, 7,500 Units, Subcutaneous, Q12H, Ronal Stroda, DO, 7,500 Units at 03/22/23 0929    hydrALAZINE injection 10 mg, 10 mg, Intravenous, Q8H PRN, Ronal Stroda, DO    hydrALAZINE tablet 25 mg, 25 mg, Oral, Q8H, Ronal Stroda, DO, 25 mg at 03/22/23 0509    insulin aspart U-100 injection 0-5 Units, 0-5 Units, Subcutaneous, QID (AC + HS) PRN, Ronal Stroda, DO    labetalol 20 mg/4 mL (5 mg/mL) IV syring, 10 mg, Intravenous, Q4H PRN, Ronal Stroda, DO, 10 mg at 03/20/23 1809    labetaloL tablet 200 mg, 200 mg, Oral, Q12H, Sarah Hoffman, FNP, 200 mg at 03/22/23 0925    LIDOcaine-prilocaine cream, , Topical (Top), PRN, Diana Hinds MD, Given at 03/21/23 1424    mupirocin 2 % ointment, , Nasal, BID, Diana Hinds MD, Given at 03/22/23 0928    naloxone 0.4 mg/mL injection 0.02 mg, 0.02 mg, Intravenous, PRN, Ronal Stroda, DO    sodium bicarbonate tablet 1,300 mg, 1,300 mg, Oral, BID, Diana Hinds MD, 1,300 mg at 03/22/23 0923    sodium chloride 0.9% flush 10 mL, 10 mL, Intravenous, Q12H PRN, Ronal Peñaloza DO     Impression/Plan:  New onset end-stage renal disease:  Patient will follow-up with Dr. Rendon concerning dysfunctional left forearm AV fistula.  Patient will follow-up with Dr. Waterman on Monday at 2:00 p.m. patient is stable for discharge from a renal standpoint.  Patient's daughter tanya Glover is her main  caregiver.  Hypertension:  Stable well with stable with better control at this time.  With said patient home on labetalol 100 mg p.o. b.i.d. continue labetalol if edema persists may not tolerate Norvasc  Lower extremity edema:  Discharged on Lasix 80 mg p.o. b.i.d.  Metabolic acidosis:  Can discharge can discharge on bicarbonate tablets 1300 mg p.o. b.i.d.  Normochromic normocytic anemia: Consider transfusion 2 units packed red blood cells today will discuss with primary team.  Patient is receiving iron as well.  Diana Hinds M.D.  Nephrology

## 2023-03-22 NOTE — DISCHARGE SUMMARY
[facility] Medicine Discharge Summary    Admitting Physician: Yolanda Choi MD  Attending Physician: Yolanda Choi MD  Date of Admit: 3/20/2023  Date of Discharge: 3/22/2023    Condition: Good  Outcome: Condition has improved and patient is now ready for discharge.  DISPOSITION: Home or Self Care          Discharge Diagnoses     Patient Active Problem List   Diagnosis    Body mass index (BMI) 40.0-44.9, adult    Diabetes mellitus    Hyperlipidemia    Hypertension    ESRD (end stage renal disease)       Principal Problem:  ESRD (end stage renal disease)    Consultants and Procedures     Consultants:  IP CONSULT TO INTERNAL MEDICINE  IP CONSULT TO NEPHROLOGY  IP CONSULT TO SOCIAL WORK/CASE MANAGEMENT    Procedures:   * No surgery found *     Brief Admission History      Veronica Arauz is a 62 y.o. female who  has a past medical history of Diabetes mellitus and Renal disorder.  She presented to [place] ED on 3/19/2023  with a primary complaint of generalized weakness, shakiness, and changes in speech. Was found to be hypoglycemic in the 30s. Sx improved after given juice and sandwich and glucagon. Incidentally found to have UTI and was given IV CTX for one dose and discharged home with outpatient macrobid which has not been filled because this morning patient had another hypoglycemic episode and was seen again in [facility] given orange juice, sandwich and patient returned to baseline mentation. Although she was SOB with signs of volume overload at that time, was given lasix in the ED. There she also satted 74% on RA per triage note. Patient states that for the past 2 weeks she has been getting congestion, dry cough, post-nasal drip and mild dyspnea with exertion. Stated that her ASHER would resolved rapidly after resting. Reports sleeping in recliner because she has trouble breathing lying in bed. Does endorse leg swelling, foul smelling urine.  She denies any fever, chills, sinus pain, sore throat, N/V/D, abd pain,  "sputum production, dysuria, hematuria, CP, dysuria, frequency, hematuria.            Patient follows Dr. Camacho for nephrology. Has had a LUE AV fistula for "over 2 years" anticipating the need for dialysis although has not been used yet.    Prior records indicated patient is on trulicity but pt states she discontinued this herself a month ago. She is on amaryl which her pcp had recently reduced dose from 4 to 2 2/2 hypoglycemic episodes.        Hospital Course with Pertinent Findings     Patient came after being seen at ED in [place] twice for hypoglycemic episode and UTI when found to be dyspneic and satting in the 70s on RA. Initially required 4L O2 but rapidly improved and was comfortable on RA. Abg showing hypoxemic respiratory failure. Anion gap acidosis noted c/w ESRD. Pt had leukocytosis likely 2/2 UTI which was treated with CTX for a duration of 3 days and leukocytosis resolution. Cxr negative. Blood and urine cultures NGTD. Renal was consulted for assistance and dialysis was attempted although unsuccessful. Pt was volume overloaded and diuresed with lasix and Cr responded appropriately for her condition. She was transfused 1 unit prbc prior to discharge. Pt feels her condition has improved, is feeling better and ready to go home. Patient does have appts set up with a vascular surgeon tomorrow and her nephrologist on Monday to re-evaluate her fistula access and begin dialysis.     Discharge physical exam:  Vitals  BP: 129/63  Temp: 97.9 °F (36.6 °C)  Temp Source: Oral  Pulse: 85  Resp: 16  SpO2: 97 %  Height: 5' (152.4 cm)  Weight: 95.5 kg (210 lb 8.6 oz)    Vital signs and nursing notes reviewed.  General:  Well developed, well nourished, no acute distress. Sitting in chair by bed wrapped in blankets.   Head: Normocephalic, atraumatic  ENT: PERRL, MMM. Pharynx is clear without erythema, exudates.No post-nasal drip. Uvula midline.   Neck: supple, normal ROM, no JVD  CVS:  RRR. S1 and S2 normal. No " murmurs, rubs, or gallops. Regular peripheral pulses. 2-3+ edema to BLE. LUE with AV graft with palpable thrill and audible bruit.   Resp:  Lungs clear to auscultation bilaterally, no wheezes, rales, or rhonchi. Normal respiratory effort. Comfortable on RA.   GI:  Abdomen soft, non-tender, non-distended, normoactive bowel sounds  MSK:  Full range of motion, no obvious deformities  Skin:  No rashes, ulcers, erythema  Neuro:  Alert and oriented x3, No focal neuro deficits, CNII-XII grossly intact  Psych:  Appropriate mood and affect          TIME SPENT ON DISCHARGE: 45 minutes    Discharge Medications        Medication List        START taking these medications      furosemide 80 MG tablet  Commonly known as: LASIX  Take 1 tablet (80 mg total) by mouth 2 (two) times daily.     labetaloL 100 MG tablet  Commonly known as: NORMODYNE  Take 1 tablet (100 mg total) by mouth 2 (two) times daily.     sodium bicarbonate 650 MG tablet  Take 2 tablets (1,300 mg total) by mouth 2 (two) times daily.            CHANGE how you take these medications      calcitRIOL 0.25 MCG Cap  Commonly known as: ROCALTROL  What changed: Another medication with the same name was removed. Continue taking this medication, and follow the directions you see here.            CONTINUE taking these medications      amLODIPine 5 MG tablet  Commonly known as: NORVASC     losartan 50 MG tablet  Commonly known as: COZAAR     TRULICITY 1.5 mg/0.5 mL pen injector  Generic drug: dulaglutide            STOP taking these medications      glimepiride 4 MG tablet  Commonly known as: AMARYL     metoprolol succinate 25 MG 24 hr tablet  Commonly known as: TOPROL-XL     torsemide 20 MG Tab  Commonly known as: DEMADEX               Where to Get Your Medications        These medications were sent to [address] [address]      Phone: [phone]   furosemide 80 MG  tablet  labetaloL 100 MG tablet  sodium bicarbonate 650 MG tablet         Discharge Information:     The case has been discussed with staff/attending physician. The patient has been seen and evaluated during rounds where the plan to discharge was discussed with pt. F/u see below. Discontinue amaryl and torsemide. Will need to get back with PCP for diabetes medication as I discontinued her amaryl considering 2 recent hypoglycemic episodes. Sending patient home with 80 lasix BID to take instead of torsemide  as well as labetalol instead of toprol for the time being. Continue with bicarb tabs considering the acidemia 2/2 ESRD. RTER if any new/worsening sx. Pt/family express understanding and agree with the plan.      Follow-up Information       Kala Rendon MD Follow up in 1 day(s).    Specialty: Vascular Surgery  Contact information:  [address]  [address]  [facility] [address]  [phone]               Sanya Waterman MD Follow up on 3/27/2023.    Specialty: Nephrology  Contact information:  [address]  [facility] [address]  [phone]               Tarun Mcclellan MD Follow up.    Specialty: Family Medicine  Contact information:  [address] [facility] [address]  [phone]               [facility] Emergency Dept. Go to.    Specialty: Emergency Medicine  Why: As needed, If symptoms worsen  Contact information:  [address] [facility] [address] [phone]                               Ronal Peñaloza DO  \[facility] [place] [facility]\"" Internal Medicine, HO-1

## 2023-03-22 NOTE — MEDICAL/APP STUDENT
[facility] Surgery/General Surgery  CONSULT NOTE  Chief Complaint:   HD line placement    History of Present Illness:  Veronica Arauz is a 62 y.o. female with PMHx of DM and ESRD who presented here for SOB from [facility] in [place] on 3/20.  Her symptoms have been improving with lasix since admission. Due to patient's ESRD and anion gap metabolic acidosis, nephrology recommended undergoing hemodialysis here. HD was attempted using the patient's existing LUE AVF which she has had for 2 years but has not used; this was unsuccessful as the AVF infiltrated upon cannulation. Vascular Surgery was consulted for placement of a temporary HD catheter.    Review of Systems:  Negative other than stated in HPI on 10 point review of systems.     Allergies:  Review of patient's allergies indicates:  No Known Allergies    Home Medications:  No current facility-administered medications on file prior to encounter.     Current Outpatient Medications on File Prior to Encounter   Medication Sig    amLODIPine (NORVASC) 5 MG tablet Take 5 mg by mouth.    calcitRIOL (ROCALTROL) 0.25 MCG Cap Take 0.5 mcg by mouth.    calcitRIOL (ROCALTROL) 0.5 MCG Cap Take 1 mcg by mouth.    glimepiride (AMARYL) 4 MG tablet Take 4 mg by mouth every morning.    losartan (COZAAR) 50 MG tablet Take 50 mg by mouth.    metoprolol succinate (TOPROL-XL) 25 MG 24 hr tablet Take 25 mg by mouth.    torsemide (DEMADEX) 20 MG Tab Take 40 mg by mouth.    TRULICITY 1.5 mg/0.5 mL pen injector Inject into the skin.       Past Medical History:  Past Medical History:   Diagnosis Date    Diabetes mellitus     Renal disorder        Past Surgical History:  Past Surgical History:   Procedure Laterality Date    HYSTERECTOMY         Family History:   History reviewed. No pertinent family history.    Social History:   Social History     Tobacco Use    Smoking status: Never    Smokeless tobacco: Never   Substance Use Topics    Alcohol use: Never    Drug use: Never        Vital  Signs:  Temp: 98.6 °F (37 °C) (03/22/23 0500)  Pulse: 84 (03/22/23 0500)  Resp: 18 (03/22/23 0500)  BP: (!) 152/70 (03/22/23 0500)  SpO2: 98 % (03/22/23 0500)    Physical Exam:  General: NAD  HEENT: normocephalic, mucous membranes moist, no scleral icterus  Neck: No JVD bruit thyromegaly adenopathy appreciated  Lungs:  Coarse breath sounds at bases bilaterally  Cardiovascular: RRR  Extremities: 1+ pitting edema of lower extremities up to sacrum.  There is a positive thrill and bruit to av fistula in left forearm  Abdomen: S/ND/NT, no masses/hernias  Skin: turgor normal. No rashes or lesions  Neurologic: No focal numbness or weakness  Psych/Behavioral:  A&Ox3, appropriate affect.    Laboratory:  WBC 13.1 from 15.1  Hgb 7.7 from 8.5  Cr 6.89 from 6.55  BUN 87.1 from 85.3      Diagnostic Results:  X-Ray Chest PA And Lateral (3/20/23):  Impression:     Central vascular congestion with mild edema    ASSESSMENT:     Veronica Arauz is a 62 y.o. female with PMHx of DM and ESRD who is admitted to medicine for SOB and needing HD while in hospital. HD access via LUE AVF unsuccessful, will need temporary access.    PLAN:     - Place temporary HD catheter so patient can receive HD while in hospital    Wale Sharma  5:26 AM

## 2023-03-24 ENCOUNTER — PATIENT OUTREACH (OUTPATIENT)
Dept: ADMINISTRATIVE | Facility: CLINIC | Age: 63
End: 2023-03-24
Payer: COMMERCIAL

## 2023-03-24 NOTE — PROGRESS NOTES
C3 nurse spoke with Veronica Arauz for a TCC post hospital discharge follow up call. Pt denies any new symptoms and OOC number was provided to pt. The patient has a scheduled follow up with Kala Rendon MD (Vascular Surgery) on 3/23/2023 @ 0840, a follow up with Sanya Waterman MD (Nephrology) on 3/27/2023 @ 1400, and a follow up with Tarun Mcclellan MD (Family Medicine) on 3/29/23 at 0830. Unable to route to PCP.

## 2023-03-25 LAB
BACTERIA BLD CULT: NORMAL
BACTERIA BLD CULT: NORMAL

## 2023-03-30 ENCOUNTER — ANESTHESIA EVENT (OUTPATIENT)
Dept: SURGERY | Facility: HOSPITAL | Age: 63
End: 2023-03-30
Payer: COMMERCIAL

## 2023-03-30 NOTE — PROGRESS NOTES
Roberto Hayden NP recommends evaluation from Cardiologist prior to surgery. Spoke with both DAVID Reyna and DAVID Ho at Dr Rendon's office to make aware of Roberto's recommendations. Will discuss with Dr Rendon and call back with plan for patient.

## 2023-03-30 NOTE — PROGRESS NOTES
Chart and cardiology documents reviewed. Last seen by cardiology in 2021 with instructions to follow-up in 3 month. Patient never returned to cardiology. As per cardiology: her echo shows normal EF with aortic sclerosis and mild aortic insufficiency. However, she does have a positive stress test in the inferior wall. Further work-up and perfusion defect was discussed by she elected to wait. Dr. Rendon's office informed cardiac evaluation recommended prior to procedure secondary to abnormal stress test and cardiology discussion with patient. sd

## 2023-04-03 ENCOUNTER — HOSPITAL ENCOUNTER (OUTPATIENT)
Facility: HOSPITAL | Age: 63
Discharge: HOME | End: 2023-04-03
Attending: SPECIALIST | Admitting: SPECIALIST
Payer: COMMERCIAL

## 2023-04-03 ENCOUNTER — ANESTHESIA (OUTPATIENT)
Dept: SURGERY | Facility: HOSPITAL | Age: 63
End: 2023-04-03
Payer: COMMERCIAL

## 2023-04-03 DIAGNOSIS — N18.5 CHRONIC KIDNEY DISEASE, STAGE V: Primary | ICD-10-CM

## 2023-04-03 LAB
ANION GAP SERPL CALC-SCNC: 12 MEQ/L
BUN SERPL-MCNC: 102.7 MG/DL (ref 9.8–20.1)
CALCIUM SERPL-MCNC: 9.1 MG/DL (ref 8.4–10.2)
CHLORIDE SERPL-SCNC: 106 MMOL/L (ref 98–107)
CO2 SERPL-SCNC: 19 MMOL/L (ref 23–31)
CREAT SERPL-MCNC: 7.63 MG/DL (ref 0.55–1.02)
CREAT/UREA NIT SERPL: 13
ERYTHROCYTE [DISTWIDTH] IN BLOOD BY AUTOMATED COUNT: 13.4 % (ref 11.5–17)
GFR SERPLBLD CREATININE-BSD FMLA CKD-EPI: 6 MLS/MIN/1.73/M2
GLUCOSE SERPL-MCNC: 128 MG/DL (ref 82–115)
HCT VFR BLD AUTO: 27.6 % (ref 37–47)
HGB BLD-MCNC: 9.2 G/DL (ref 12–16)
MCH RBC QN AUTO: 30 PG (ref 27–31)
MCHC RBC AUTO-ENTMCNC: 33.3 G/DL (ref 33–36)
MCV RBC AUTO: 89.9 FL (ref 80–94)
NRBC BLD AUTO-RTO: 0 %
PLATELET # BLD AUTO: 329 X10(3)/MCL (ref 130–400)
PMV BLD AUTO: 11 FL (ref 7.4–10.4)
POCT GLUCOSE: 114 MG/DL (ref 70–110)
POTASSIUM SERPL-SCNC: 5 MMOL/L (ref 3.5–5.1)
RBC # BLD AUTO: 3.07 X10(6)/MCL (ref 4.2–5.4)
SODIUM SERPL-SCNC: 137 MMOL/L (ref 136–145)
WBC # SPEC AUTO: 9 X10(3)/MCL (ref 4.5–11.5)

## 2023-04-03 PROCEDURE — 63600175 PHARM REV CODE 636 W HCPCS: Performed by: SPECIALIST

## 2023-04-03 PROCEDURE — 71000016 HC POSTOP RECOV ADDL HR: Performed by: SPECIALIST

## 2023-04-03 PROCEDURE — 36000706: Performed by: SPECIALIST

## 2023-04-03 PROCEDURE — 37000008 HC ANESTHESIA 1ST 15 MINUTES: Performed by: SPECIALIST

## 2023-04-03 PROCEDURE — D9220A PRA ANESTHESIA: ICD-10-PCS | Mod: CRNA,,, | Performed by: NURSE ANESTHETIST, CERTIFIED REGISTERED

## 2023-04-03 PROCEDURE — 25000003 PHARM REV CODE 250: Performed by: SPECIALIST

## 2023-04-03 PROCEDURE — 80048 BASIC METABOLIC PNL TOTAL CA: CPT | Performed by: SPECIALIST

## 2023-04-03 PROCEDURE — 82962 GLUCOSE BLOOD TEST: CPT | Performed by: SPECIALIST

## 2023-04-03 PROCEDURE — 71000015 HC POSTOP RECOV 1ST HR: Performed by: SPECIALIST

## 2023-04-03 PROCEDURE — 36000707: Performed by: SPECIALIST

## 2023-04-03 PROCEDURE — 85027 COMPLETE CBC AUTOMATED: CPT | Performed by: ANESTHESIOLOGY

## 2023-04-03 PROCEDURE — 63600175 PHARM REV CODE 636 W HCPCS: Performed by: ANESTHESIOLOGY

## 2023-04-03 PROCEDURE — D9220A PRA ANESTHESIA: ICD-10-PCS | Mod: ANES,,, | Performed by: ANESTHESIOLOGY

## 2023-04-03 PROCEDURE — 63600175 PHARM REV CODE 636 W HCPCS: Performed by: NURSE ANESTHETIST, CERTIFIED REGISTERED

## 2023-04-03 PROCEDURE — 71000033 HC RECOVERY, INTIAL HOUR: Performed by: SPECIALIST

## 2023-04-03 PROCEDURE — 37000009 HC ANESTHESIA EA ADD 15 MINS: Performed by: SPECIALIST

## 2023-04-03 PROCEDURE — 25000003 PHARM REV CODE 250: Performed by: ANESTHESIOLOGY

## 2023-04-03 PROCEDURE — 76942 ECHO GUIDE FOR BIOPSY: CPT | Performed by: ANESTHESIOLOGY

## 2023-04-03 PROCEDURE — D9220A PRA ANESTHESIA: Mod: CRNA,,, | Performed by: NURSE ANESTHETIST, CERTIFIED REGISTERED

## 2023-04-03 PROCEDURE — D9220A PRA ANESTHESIA: Mod: ANES,,, | Performed by: ANESTHESIOLOGY

## 2023-04-03 PROCEDURE — 25000003 PHARM REV CODE 250: Performed by: NURSE ANESTHETIST, CERTIFIED REGISTERED

## 2023-04-03 RX ORDER — SODIUM CHLORIDE, SODIUM LACTATE, POTASSIUM CHLORIDE, CALCIUM CHLORIDE 600; 310; 30; 20 MG/100ML; MG/100ML; MG/100ML; MG/100ML
INJECTION, SOLUTION INTRAVENOUS CONTINUOUS
Status: DISCONTINUED | OUTPATIENT
Start: 2023-04-03 | End: 2023-04-03 | Stop reason: HOSPADM

## 2023-04-03 RX ORDER — HEPARIN SODIUM 5000 [USP'U]/ML
INJECTION, SOLUTION INTRAVENOUS; SUBCUTANEOUS
Status: DISCONTINUED | OUTPATIENT
Start: 2023-04-03 | End: 2023-04-03 | Stop reason: HOSPADM

## 2023-04-03 RX ORDER — PROPOFOL 10 MG/ML
VIAL (ML) INTRAVENOUS CONTINUOUS PRN
Status: DISCONTINUED | OUTPATIENT
Start: 2023-04-03 | End: 2023-04-03

## 2023-04-03 RX ORDER — LIDOCAINE HYDROCHLORIDE 10 MG/ML
1 INJECTION, SOLUTION EPIDURAL; INFILTRATION; INTRACAUDAL; PERINEURAL ONCE
Status: DISCONTINUED | OUTPATIENT
Start: 2023-04-03 | End: 2023-04-03 | Stop reason: HOSPADM

## 2023-04-03 RX ORDER — SODIUM CHLORIDE, SODIUM GLUCONATE, SODIUM ACETATE, POTASSIUM CHLORIDE AND MAGNESIUM CHLORIDE 30; 37; 368; 526; 502 MG/100ML; MG/100ML; MG/100ML; MG/100ML; MG/100ML
INJECTION, SOLUTION INTRAVENOUS CONTINUOUS
Status: DISCONTINUED | OUTPATIENT
Start: 2023-04-03 | End: 2023-04-03 | Stop reason: HOSPADM

## 2023-04-03 RX ORDER — DEXAMETHASONE SODIUM PHOSPHATE 4 MG/ML
INJECTION, SOLUTION INTRA-ARTICULAR; INTRALESIONAL; INTRAMUSCULAR; INTRAVENOUS; SOFT TISSUE
Status: DISCONTINUED | OUTPATIENT
Start: 2023-04-03 | End: 2023-04-03

## 2023-04-03 RX ORDER — TRAMADOL HYDROCHLORIDE 50 MG/1
50 TABLET ORAL
Status: COMPLETED | OUTPATIENT
Start: 2023-04-03 | End: 2023-04-03

## 2023-04-03 RX ORDER — MEPERIDINE HYDROCHLORIDE 25 MG/ML
6.25 INJECTION INTRAMUSCULAR; INTRAVENOUS; SUBCUTANEOUS ONCE AS NEEDED
Status: DISCONTINUED | OUTPATIENT
Start: 2023-04-03 | End: 2023-04-03 | Stop reason: HOSPADM

## 2023-04-03 RX ORDER — ASPIRIN 325 MG
325 TABLET, DELAYED RELEASE (ENTERIC COATED) ORAL DAILY
Status: DISCONTINUED | OUTPATIENT
Start: 2023-04-03 | End: 2023-04-03 | Stop reason: HOSPADM

## 2023-04-03 RX ORDER — ROPIVACAINE HYDROCHLORIDE 5 MG/ML
INJECTION, SOLUTION EPIDURAL; INFILTRATION; PERINEURAL
Status: COMPLETED | OUTPATIENT
Start: 2023-04-03 | End: 2023-04-03

## 2023-04-03 RX ORDER — HYDROCODONE BITARTRATE AND ACETAMINOPHEN 5; 325 MG/1; MG/1
1 TABLET ORAL EVERY 4 HOURS PRN
Status: DISCONTINUED | OUTPATIENT
Start: 2023-04-03 | End: 2023-04-03 | Stop reason: HOSPADM

## 2023-04-03 RX ORDER — ONDANSETRON 2 MG/ML
INJECTION INTRAMUSCULAR; INTRAVENOUS
Status: DISCONTINUED | OUTPATIENT
Start: 2023-04-03 | End: 2023-04-03

## 2023-04-03 RX ORDER — LIDOCAINE HYDROCHLORIDE 20 MG/ML
INJECTION, SOLUTION EPIDURAL; INFILTRATION; INTRACAUDAL; PERINEURAL
Status: DISCONTINUED | OUTPATIENT
Start: 2023-04-03 | End: 2023-04-03

## 2023-04-03 RX ORDER — HYDROCODONE BITARTRATE AND ACETAMINOPHEN 5; 325 MG/1; MG/1
1 TABLET ORAL
Status: DISCONTINUED | OUTPATIENT
Start: 2023-04-03 | End: 2023-04-03 | Stop reason: HOSPADM

## 2023-04-03 RX ORDER — ROPIVACAINE HYDROCHLORIDE 5 MG/ML
30 INJECTION, SOLUTION EPIDURAL; INFILTRATION; PERINEURAL ONCE
Status: DISCONTINUED | OUTPATIENT
Start: 2023-04-03 | End: 2023-04-03 | Stop reason: HOSPADM

## 2023-04-03 RX ORDER — CEFAZOLIN SODIUM 2 G/50ML
2 SOLUTION INTRAVENOUS
Status: DISCONTINUED | OUTPATIENT
Start: 2023-04-03 | End: 2023-04-03 | Stop reason: HOSPADM

## 2023-04-03 RX ORDER — HYDROMORPHONE HYDROCHLORIDE 2 MG/ML
0.4 INJECTION, SOLUTION INTRAMUSCULAR; INTRAVENOUS; SUBCUTANEOUS EVERY 5 MIN PRN
Status: DISCONTINUED | OUTPATIENT
Start: 2023-04-03 | End: 2023-04-03 | Stop reason: HOSPADM

## 2023-04-03 RX ORDER — CEFAZOLIN SODIUM 1 G/3ML
INJECTION, POWDER, FOR SOLUTION INTRAMUSCULAR; INTRAVENOUS
Status: DISCONTINUED | OUTPATIENT
Start: 2023-04-03 | End: 2023-04-03 | Stop reason: HOSPADM

## 2023-04-03 RX ORDER — ONDANSETRON 4 MG/1
4 TABLET, ORALLY DISINTEGRATING ORAL
Status: COMPLETED | OUTPATIENT
Start: 2023-04-03 | End: 2023-04-03

## 2023-04-03 RX ORDER — ROPIVACAINE HYDROCHLORIDE 5 MG/ML
INJECTION, SOLUTION EPIDURAL; INFILTRATION; PERINEURAL
Status: COMPLETED
Start: 2023-04-03 | End: 2023-04-03

## 2023-04-03 RX ORDER — MIDAZOLAM HYDROCHLORIDE 1 MG/ML
2 INJECTION INTRAMUSCULAR; INTRAVENOUS ONCE AS NEEDED
Status: COMPLETED | OUTPATIENT
Start: 2023-04-03 | End: 2023-04-03

## 2023-04-03 RX ORDER — ACETAMINOPHEN 500 MG
1000 TABLET ORAL
Status: COMPLETED | OUTPATIENT
Start: 2023-04-03 | End: 2023-04-03

## 2023-04-03 RX ORDER — PROCHLORPERAZINE EDISYLATE 5 MG/ML
5 INJECTION INTRAMUSCULAR; INTRAVENOUS EVERY 30 MIN PRN
Status: DISCONTINUED | OUTPATIENT
Start: 2023-04-03 | End: 2023-04-03 | Stop reason: HOSPADM

## 2023-04-03 RX ORDER — ONDANSETRON 2 MG/ML
4 INJECTION INTRAMUSCULAR; INTRAVENOUS DAILY PRN
Status: DISCONTINUED | OUTPATIENT
Start: 2023-04-03 | End: 2023-04-03 | Stop reason: HOSPADM

## 2023-04-03 RX ADMIN — ASPIRIN 325 MG: 325 TABLET, COATED ORAL at 01:04

## 2023-04-03 RX ADMIN — ONDANSETRON 4 MG: 4 TABLET, ORALLY DISINTEGRATING ORAL at 08:04

## 2023-04-03 RX ADMIN — DEXAMETHASONE SODIUM PHOSPHATE 4 MG: 4 INJECTION, SOLUTION INTRA-ARTICULAR; INTRALESIONAL; INTRAMUSCULAR; INTRAVENOUS; SOFT TISSUE at 10:04

## 2023-04-03 RX ADMIN — HYDROMORPHONE HYDROCHLORIDE 0.4 MG: 2 INJECTION, SOLUTION INTRAMUSCULAR; INTRAVENOUS; SUBCUTANEOUS at 11:04

## 2023-04-03 RX ADMIN — TRAMADOL HYDROCHLORIDE 50 MG: 50 TABLET, COATED ORAL at 08:04

## 2023-04-03 RX ADMIN — SODIUM CHLORIDE: 9 INJECTION, SOLUTION INTRAVENOUS at 09:04

## 2023-04-03 RX ADMIN — PROPOFOL 50 MCG/KG/MIN: 10 INJECTION, EMULSION INTRAVENOUS at 09:04

## 2023-04-03 RX ADMIN — ACETAMINOPHEN 1000 MG: 500 TABLET ORAL at 08:04

## 2023-04-03 RX ADMIN — ROPIVACAINE HYDROCHLORIDE 30 ML: 5 INJECTION, SOLUTION EPIDURAL; INFILTRATION; PERINEURAL at 09:04

## 2023-04-03 RX ADMIN — PROPOFOL 80 MG: 10 INJECTION, EMULSION INTRAVENOUS at 09:04

## 2023-04-03 RX ADMIN — ONDANSETRON 4 MG: 2 INJECTION INTRAMUSCULAR; INTRAVENOUS at 10:04

## 2023-04-03 RX ADMIN — MIDAZOLAM HYDROCHLORIDE 1 MG: 1 INJECTION, SOLUTION INTRAMUSCULAR; INTRAVENOUS at 09:04

## 2023-04-03 RX ADMIN — LIDOCAINE HYDROCHLORIDE 80 MG: 20 INJECTION, SOLUTION EPIDURAL; INFILTRATION; INTRACAUDAL; PERINEURAL at 09:04

## 2023-04-03 NOTE — OP NOTE
[facility] - Periop Services  General Surgery  Operative Note    SUMMARY     Date of Procedure: 4/3/2023     Procedure: Procedure(s) (LRB):  REVISION, AV FISTULA (Left)       Surgeon(s) and Role:     * Kala Rendon MD - Primary    Assisting Surgeon:  Lucy Curry NP    Pre-Operative Diagnosis: Chronic kidney disease, stage V [N18.5]    Post-Operative Diagnosis: Post-Op Diagnosis Codes:     * Chronic kidney disease, stage V [N18.5]    Anesthesia: Choice    Operative Findings (including complications, if any):  Left forearm cephalic vein was of good size but did have excessive depth.  Successful superficialization.  Good thrill at case completion.    Description of Technical Procedures:  Mrs. Arauz was taken to the operating room placed in a supine position.  Her left arm was prepped and draped in the usual sterile fashion.  B-mode ultrasound was utilized to identify the fistula and the course of the fistula was marked.  An incision was made along the course of the fistula.  Incision length was approximately 12 cm.  Dissection was performed down to the level of the fistula and multiple side branches were ligated with 3-0 silk ties and surgical clips.  After fully mobilizing the vessel within our surgical wound we then elevated it with vessel loops.  3-0 Vicryl suture was utilized to reapproximate the deeper tissues thereby elevating the floor for which the vessel rested.  The vessel was placed in this location and then 4-0 Monocryl was utilized to close the skin over the vessel.  There was a good thrill at completion.  Dermabond Prineo dressing was placed.    Lucy Curry expertly assisted throughout the case.   She assisted with vessel exposure and superficialization.     Significant Surgical Tasks Conducted by the Assistant(s), if Applicable: none    Estimated Blood Loss (EBL): * No values recorded between 4/3/2023 10:06 AM and 4/3/2023 10:40 AM *           Implants: * No implants in  log *    Specimens:   Specimen (24h ago, onward)      None

## 2023-04-03 NOTE — ANESTHESIA POSTPROCEDURE EVALUATION
Anesthesia Post Evaluation    Patient: Veronica Arauz    Procedure(s) Performed: Procedure(s) (LRB):  REVISION, AV FISTULA (Left)    Final Anesthesia Type: general      Patient location during evaluation: PACU  Patient participation: Yes- Able to Participate  Level of consciousness: awake and alert  Post-procedure vital signs: reviewed and stable  Pain management: adequate  Airway patency: patent    PONV status at discharge: No PONV  Anesthetic complications: no      Cardiovascular status: hemodynamically stable  Respiratory status: unassisted  Hydration status: euvolemic  Follow-up not needed.          Vitals Value Taken Time   /79 04/03/23 1243   Temp 36.3 °C (97.4 °F) 04/03/23 1158   Pulse 78 04/03/23 1252   Resp 17 04/03/23 1148   SpO2 96 % 04/03/23 1252   Vitals shown include unvalidated device data.      Event Time   Out of Recovery 12:00:00         Pain/Kelly Score: Pain Rating Prior to Med Admin: 7 (4/3/2023 11:26 AM)  Kelly Score: 8 (4/3/2023 11:55 AM)  Modified Kelly Score: 20 (4/3/2023  1:15 PM)

## 2023-04-03 NOTE — TRANSFER OF CARE
Anesthesia Transfer of Care Note    Patient: Veronica Arauz    Procedure(s) Performed: Procedure(s) (LRB):  REVISION, AV FISTULA (Left)    Patient location: PACU    Anesthesia Type: general    Transport from OR: Transported from OR on room air with adequate spontaneous ventilation    Post pain: adequate analgesia    Post assessment: no apparent anesthetic complications and tolerated procedure well    Post vital signs: stable    Level of consciousness: awake and alert    Nausea/Vomiting: no nausea/vomiting    Complications: none    Transfer of care protocol was followed      Last vitals:   Visit Vitals  BP (!) 145/43   Pulse 79   Temp 36.6 °C (97.9 °F) (Oral)   Resp 16   Ht 5' (1.524 m)   Wt 87.4 kg (192 lb 10.9 oz)   SpO2 98%   Breastfeeding No   BMI 37.63 kg/m²

## 2023-04-03 NOTE — DISCHARGE SUMMARY
[facility] - Periop Services  Discharge Note  Short Stay    Procedure(s) (LRB):  REVISION, AV FISTULA (Left)      OUTCOME: Patient tolerated treatment/procedure well without complication and is now ready for discharge.    DISPOSITION: Home or Self Care    FINAL DIAGNOSIS:  Chronic kidney disease, stage V    FOLLOWUP: In clinic    DISCHARGE INSTRUCTIONS:    Discharge Procedure Orders   Diet general     Keep surgical extremity elevated     Wound care routine (specify)   Order Comments: Leave dermabond in place until it falls off;  Ok to wash with soap under running water but do not submerge.  Do not use antibiotics ointment.     Call MD for:  temperature >100.4     Call MD for:  redness, tenderness, or signs of infection (pain, swelling, redness, odor or green/yellow discharge around incision site)     Activity as tolerated         Clinical Reference Documents Added to Patient Instructions         Document    PLACEMENT OF A VASCULAR ACCESS FOR DIALYSIS (ENGLISH)            TIME SPENT ON DISCHARGE: 5 minutes

## 2023-04-03 NOTE — ANESTHESIA PREPROCEDURE EVALUATION
"                                                                                                             2023  Veronica Arauz is a 62 y.o., female with ----------------------------  Chronic kidney disease, stage 5  Diabetes mellitus  Hypertension  Renal disorder    And ----------------------------  Av fistula placement   section      Comment:  x2  Colonoscopy  Hysterectomy    Presents for Left AV Fistula revision- not yet on dialysis.  Pt is still fairly active, feels well - denies CP or SOB  + fatigue but not unusually so    Pt was evaluated by [facility] and the following were noted when she had transplant w/u.      "Chart and cardiology documents reviewed. Last seen by cardiology in  with instructions to follow-up in 3 month. Patient never returned to cardiology. As per cardiology: her echo shows normal EF with aortic sclerosis and mild aortic insufficiency. However, she does have a positive stress test in the inferior wall. Further work-up and perfusion defect was discussed by she elected to wait. Dr. Rendon's office informed cardiac evaluation recommended prior to procedure secondary to abnormal stress test and cardiology discussion with patient. Sd"    She had mildly positive stress test two years ago but is asymptomatic with friable kidneys so she was medically managed -     "  Pre-op Assessment    I have reviewed the NPO Status.      Review of Systems     Latest Reference Range & Units 23 07:53 23 08:12   WBC 4.5 - 11.5 x10(3)/mcL  9.0   Hemoglobin 12.0 - 16.0 g/dL  9.2 (L)   Hematocrit 37.0 - 47.0 %  27.6 (L)   Platelets 130 - 400 x10(3)/mcL  329   Sodium 136 - 145 mmol/L 137    Potassium 3.5 - 5.1 mmol/L 5.0    Chloride 98 - 107 mmol/L 106    CO2 23 - 31 mmol/L 19 (L)    Anion Gap mEq/L 12.0    BUN 9.8 - 20.1 mg/dL 102.7 (H)    Creatinine 0.55 - 1.02 mg/dL 7.63 (H)    BUN/CREAT RATIO  13    eGFR mls/min/1.73/m2 6    Glucose 82 - 115 mg/dL 128 (H)    (L): Data is " abnormally low  (H): Data is abnormally high    Physical Exam  General: Well nourished, Cooperative, Alert and Oriented    Airway:  Mallampati: II   Mouth Opening: Normal  TM Distance: Normal  Tongue: Normal  Neck ROM: Normal ROM    Dental:  Edentulous    Chest/Lungs:  Clear to auscultation, Normal Respiratory Rate    Heart:  Rate: Normal  Rhythm: Regular Rhythm        Anesthesia Plan  Type of Anesthesia, risks & benefits discussed:    Anesthesia Type: Regional  Intra-op Monitoring Plan: Standard ASA Monitors  Post Op Pain Control Plan: multimodal analgesia, peripheral nerve block and IV/PO Opioids PRN  Induction:  IV  Informed Consent: Informed consent signed with the Patient and all parties understand the risks and agree with anesthesia plan.  All questions answered.   ASA Score: 4  Day of Surgery Review of History & Physical: H&P Update referred to the surgeon/provider.  Anesthesia Plan Notes: Premedication: Midazolam  Regional: Supraclavicular vs interscalene nerve block for surgical anesthesia - Ropiv 0.5% 30mls  Special Technique: Preemptive analgesia with neurontin, zofran, acetaminophen and celebrex or tramadol  Nasal Cannula vs O2 via facemask- consider Supernova Nasal CPAP for obese or NANCY patients  PONV prophylaxis  Pt may go directly to Phase 2 if criteria met    Ready For Surgery From Anesthesia Perspective.     .

## 2023-04-03 NOTE — ANESTHESIA PROCEDURE NOTES
Intubation    Date/Time: 4/3/2023 9:57 AM  Performed by: Nic Packer CRNA  Authorized by: Ana Caruso MD     Intubation:     Induction:  Intravenous    Intubated:  Postinduction    Mask Ventilation:  Easy with oral airway    Attempts:  1    Attempted By:  CRNA    Difficult Airway Encountered?: No      Airway Device:  Supraglottic airway/LMA    Airway Device Size:  4.0    Style/Cuff Inflation:  Cuffed (inflated to minimal occlusive pressure)    Inflation Amount (mL):  18    Placement Verified By:  Capnometry    Complicating Factors:  None    Findings Post-Intubation:  BS equal bilateral

## 2023-04-03 NOTE — ANESTHESIA PROCEDURE NOTES
Peripheral Block    Patient location during procedure: pre-op    Reason for block: primary anesthetic    Diagnosis: Left AV Fistula creation   Start time: 4/3/2023 9:22 AM  Timeout: 4/3/2023 9:20 AM   End time: 4/3/2023 9:23 AM    Staffing  Authorizing Provider: Ana Caruso MD  Performing Provider: Ana Caruso MD    Preanesthetic Checklist  Completed: patient identified, IV checked, site marked, risks and benefits discussed, surgical consent, monitors and equipment checked, pre-op evaluation and timeout performed  Peripheral Block  Patient position: supine  Prep: ChloraPrep  Patient monitoring: heart rate, cardiac monitor, continuous pulse ox, continuous capnometry and frequent blood pressure checks  Block type: supraclavicular  Laterality: left  Injection technique: single shot  Needle  Needle type: Stimuplex   Needle gauge: 22 G  Needle length: 2 in  Needle localization: anatomical landmarks and ultrasound guidance   -ultrasound image captured on disc.  Assessment  Injection assessment: negative aspiration, negative parasthesia and local visualized surrounding nerve  Paresthesia pain: none  Heart rate change: no  Slow fractionated injection: yes  Pain Tolerance: comfortable throughout block and no complaints  Medications:    Medications: ropivacaine (NAROPIN) injection 0.5% - Perineural   30 mL - 4/3/2023 9:22:00 AM    Additional Notes  VSS.  RN monitoring vitals throughout procedure.  Patient tolerated procedure well.      IV Sedation used and titrated for patient comfort-versed 2mg IV    Ultrasound guidance used to visualize the nerve bundle and sheath as well as confirm needle placement and deposition of the local anesthetic.  (1) Under ultrasound guidance, needle was inserted and placed in close proximity to the nerve bundle  (2) Ultrasound was also used to visualize the spread of the anesthetic in close proximity to the brachial plexus  (3) The nerves appeared anatomically normal  (4) There were  no apparent abnormal pathological findings    Ultrasound photos archived.  Pt tolerated procedure well. There were no immediate complications.

## 2023-04-04 LAB — POCT GLUCOSE: 154 MG/DL (ref 70–110)

## 2023-04-05 VITALS
HEART RATE: 78 BPM | DIASTOLIC BLOOD PRESSURE: 79 MMHG | HEIGHT: 60 IN | BODY MASS INDEX: 37.83 KG/M2 | TEMPERATURE: 97 F | WEIGHT: 192.69 LBS | OXYGEN SATURATION: 96 % | SYSTOLIC BLOOD PRESSURE: 134 MMHG | RESPIRATION RATE: 15 BRPM

## 2023-04-18 RX ORDER — LORATADINE 10 MG/1
TABLET ORAL
COMMUNITY
End: 2023-10-05

## 2023-04-18 RX ORDER — LOSARTAN POTASSIUM 50 MG/1
TABLET ORAL
Status: ON HOLD | COMMUNITY
End: 2023-09-08 | Stop reason: HOSPADM

## 2023-04-18 RX ORDER — FENOFIBRATE 145 MG/1
TABLET, FILM COATED ORAL
COMMUNITY

## 2023-04-18 RX ORDER — ATORVASTATIN CALCIUM 10 MG/1
TABLET, FILM COATED ORAL
Status: ON HOLD | COMMUNITY
End: 2023-09-08 | Stop reason: HOSPADM

## 2023-04-18 RX ORDER — TORSEMIDE 10 MG/1
TABLET ORAL
Status: ON HOLD | COMMUNITY
End: 2023-09-08 | Stop reason: HOSPADM

## 2023-04-18 RX ORDER — CALCITRIOL 0.5 UG/1
CAPSULE ORAL
Status: ON HOLD | COMMUNITY
End: 2023-09-08 | Stop reason: HOSPADM

## 2023-04-18 RX ORDER — HYDROCHLOROTHIAZIDE 12.5 MG/1
TABLET ORAL
Status: ON HOLD | COMMUNITY
End: 2023-09-08 | Stop reason: HOSPADM

## 2023-04-18 RX ORDER — LOSARTAN POTASSIUM 100 MG/1
TABLET ORAL
COMMUNITY

## 2023-04-18 RX ORDER — METOPROLOL SUCCINATE 25 MG/1
TABLET, EXTENDED RELEASE ORAL
Status: ON HOLD | COMMUNITY
End: 2023-09-08 | Stop reason: HOSPADM

## 2023-04-18 RX ORDER — GLIMEPIRIDE 4 MG/1
TABLET ORAL
COMMUNITY
End: 2023-10-05

## 2023-04-18 RX ORDER — HYDROCODONE BITARTRATE AND ACETAMINOPHEN 7.5; 325 MG/1; MG/1
TABLET ORAL
COMMUNITY
End: 2023-10-05

## 2023-04-18 RX ORDER — TORSEMIDE 20 MG/1
TABLET ORAL
Status: ON HOLD | COMMUNITY
End: 2023-09-08 | Stop reason: HOSPADM

## 2023-04-21 NOTE — ADDENDUM NOTE
Addendum  created 04/21/23 0644 by Ana Caruso MD    Attestation recorded in Intraprocedure, Intraprocedure Attestations filed, Intraprocedure Event edited

## 2023-04-25 ENCOUNTER — OFFICE VISIT (OUTPATIENT)
Dept: VASCULAR SURGERY | Facility: CLINIC | Age: 63
End: 2023-04-25
Payer: COMMERCIAL

## 2023-04-25 VITALS
BODY MASS INDEX: 38.09 KG/M2 | HEIGHT: 60 IN | SYSTOLIC BLOOD PRESSURE: 132 MMHG | HEART RATE: 83 BPM | WEIGHT: 194 LBS | DIASTOLIC BLOOD PRESSURE: 71 MMHG

## 2023-04-25 DIAGNOSIS — N18.5 CKD (CHRONIC KIDNEY DISEASE) STAGE 5, GFR LESS THAN 15 ML/MIN: Primary | ICD-10-CM

## 2023-04-25 PROCEDURE — 3066F NEPHROPATHY DOC TX: CPT | Mod: CPTII,,, | Performed by: SPECIALIST

## 2023-04-25 PROCEDURE — 3075F SYST BP GE 130 - 139MM HG: CPT | Mod: CPTII,,, | Performed by: SPECIALIST

## 2023-04-25 PROCEDURE — 3078F PR MOST RECENT DIASTOLIC BLOOD PRESSURE < 80 MM HG: ICD-10-PCS | Mod: CPTII,,, | Performed by: SPECIALIST

## 2023-04-25 PROCEDURE — 3066F PR DOCUMENTATION OF TREATMENT FOR NEPHROPATHY: ICD-10-PCS | Mod: CPTII,,, | Performed by: SPECIALIST

## 2023-04-25 PROCEDURE — 1160F PR REVIEW ALL MEDS BY PRESCRIBER/CLIN PHARMACIST DOCUMENTED: ICD-10-PCS | Mod: CPTII,,, | Performed by: SPECIALIST

## 2023-04-25 PROCEDURE — 3008F PR BODY MASS INDEX (BMI) DOCUMENTED: ICD-10-PCS | Mod: CPTII,,, | Performed by: SPECIALIST

## 2023-04-25 PROCEDURE — 1160F RVW MEDS BY RX/DR IN RCRD: CPT | Mod: CPTII,,, | Performed by: SPECIALIST

## 2023-04-25 PROCEDURE — 3078F DIAST BP <80 MM HG: CPT | Mod: CPTII,,, | Performed by: SPECIALIST

## 2023-04-25 PROCEDURE — 3075F PR MOST RECENT SYSTOLIC BLOOD PRESS GE 130-139MM HG: ICD-10-PCS | Mod: CPTII,,, | Performed by: SPECIALIST

## 2023-04-25 PROCEDURE — 4010F ACE/ARB THERAPY RXD/TAKEN: CPT | Mod: CPTII,,, | Performed by: SPECIALIST

## 2023-04-25 PROCEDURE — 99024 PR POST-OP FOLLOW-UP VISIT: ICD-10-PCS | Mod: ,,, | Performed by: SPECIALIST

## 2023-04-25 PROCEDURE — 99024 POSTOP FOLLOW-UP VISIT: CPT | Mod: ,,, | Performed by: SPECIALIST

## 2023-04-25 PROCEDURE — 1159F PR MEDICATION LIST DOCUMENTED IN MEDICAL RECORD: ICD-10-PCS | Mod: CPTII,,, | Performed by: SPECIALIST

## 2023-04-25 PROCEDURE — 3008F BODY MASS INDEX DOCD: CPT | Mod: CPTII,,, | Performed by: SPECIALIST

## 2023-04-25 PROCEDURE — 4010F PR ACE/ARB THEARPY RXD/TAKEN: ICD-10-PCS | Mod: CPTII,,, | Performed by: SPECIALIST

## 2023-04-25 PROCEDURE — 1159F MED LIST DOCD IN RCRD: CPT | Mod: CPTII,,, | Performed by: SPECIALIST

## 2023-04-25 RX ORDER — ALLOPURINOL 100 MG/1
100 TABLET ORAL DAILY
COMMUNITY

## 2023-04-25 NOTE — PROGRESS NOTES
[facility] Note  Kala Rendon MD    Patient Name: Veronica Arauz     MRN: [id number]   Visit Date: 2023    Subjective:       Hemodialysis Access (Ms. Arauz presents today s/p left radiocephalic fistula revision with superficialization on 4/3/2023. Her most recent GFR is 8. She denies signs and symptoms of infection to her left forearm. She denies pain, numbness or dysfunction to her left hand. )      Past Medical History:   Diagnosis Date    Anemia, unspecified     Chronic kidney disease, stage 5     Diabetes mellitus     Hyperlipidemia     Hypertension     Renal disorder      Past Surgical History:   Procedure Laterality Date    AV FISTULA PLACEMENT Left      SECTION      x2    COLONOSCOPY      HYSTERECTOMY      REVISION OF ARTERIOVENOUS FISTULA Left 4/3/2023    Procedure: REVISION, AV FISTULA;  Surgeon: Kala Rendon MD;  Location: [facility];  Service: Peripheral Vascular;  Laterality: Left;  LEFT RADIOCEPHALIC REVISION WITH SUPERFICIALIZATION // SUPINE //  AXILLARY BLOCK     Family History   Family history unknown: Yes     Social History     Socioeconomic History    Marital status:    Tobacco Use    Smoking status: Never    Smokeless tobacco: Never   Substance and Sexual Activity    Alcohol use: Never    Drug use: Never     Social Determinants of Health     Financial Resource Strain: Low Risk     Difficulty of Paying Living Expenses: Not hard at all   Food Insecurity: No Food Insecurity    Worried About Running Out of Food in the Last Year: Never true    Ran Out of Food in the Last Year: Never true   Transportation Needs: No Transportation Needs    Lack of Transportation (Medical): No    Lack of Transportation (Non-Medical): No   Physical Activity: Insufficiently Active    Days of Exercise per Week: 4 days    Minutes of Exercise per Session: 20 min   Stress: No Stress Concern Present    Feeling of Stress : Not at all   Social Connections: Socially Isolated    Frequency  of Communication with Friends and Family: More than three times a week    Frequency of Social Gatherings with Friends and Family: More than three times a week    Attends [employer] Services: Never    Active Member of Clubs or Organizations: No    Attends Club or Organization Meetings: Never    Marital Status:    Housing Stability: Unknown    Unable to Pay for Housing in the Last Year: No    Number of Places Lived in the Last Year: 1     Current Outpatient Medications   Medication Instructions    allopurinoL (ZYLOPRIM) 100 mg, Oral, Daily    amLODIPine (NORVASC) 5 mg, Oral, Daily    atorvastatin (LIPITOR) 10 MG tablet atorvastatin 10 mg tablet   TAKE ONE TABLET BY MOUTH EVERY DAY    calcitRIOL (ROCALTROL) 0.5 MCG Cap calcitriol 0.5 mcg capsule   TAKE TWO CAPSULES BY MOUTH EVERY DAY / DOSAGE INCREASE Orally Once a day 30 days    calcitRIOL (ROCALTROL) 0.5 mcg, Oral, Daily    fenofibrate (TRICOR) 145 MG tablet fenofibrate nanocrystallized 145 mg tablet   TAKE ONE TABLET BY MOUTH ONCE DAILY to reduce triglycerides.    furosemide (LASIX) 80 mg, Oral, 2 times daily    glimepiride (AMARYL) 4 MG tablet glimepiride 4 mg tablet   TAKE ONE TABLET BY MOUTH IN THE MORNING FOR DIABETES    hydroCHLOROthiazide (HYDRODIURIL) 12.5 MG Tab hydrochlorothiazide 12.5 mg tablet   Take 1 tablet every day by oral route for 30 days.    HYDROcodone-acetaminophen (NORCO) 7.5-325 mg per tablet hydrocodone 7.5 mg-acetaminophen 325 mg tablet   Take 1 tablet every 4 hours by oral route as needed.    labetaloL (NORMODYNE) 100 mg, Oral, 2 times daily    loratadine (CLARITIN) 10 mg tablet loratadine 10 mg tablet   TAKE ONE TABLET BY MOUTH ONCE DAILY    losartan (COZAAR) 100 MG tablet losartan 100 mg tablet   TAKE 1/2 TABLET BY MOUTH EVERY DAY    losartan (COZAAR) 50 MG tablet TAKE ONE TABLET BY MOUTH EVERY DAY FOR BLOOD PRESSURE Orally Once a day for 90 day(s)    losartan (COZAAR) 50 mg, Oral, Daily    metoprolol succinate (TOPROL-XL) 25 MG 24  hr tablet metoprolol succinate ER 25 mg tablet,extended release 24 hr   TAKE ONE TABLET BY MOUTH ONCE DAILY blood pressure AND heart rate    sodium bicarbonate 1,300 mg, Oral, 2 times daily    torsemide (DEMADEX) 10 MG Tab torsemide 10 mg tablet   TAKE ONE TABLET BY MOUTH EVERY DAY FOR FLUID    torsemide (DEMADEX) 20 MG Tab torsemide 20 mg tablet   2 tablet Orally Once a day 30 days    TRULICITY 1.5 mg, Subcutaneous, Every 7 days    vit A,C and E-dietary suppl#12 5,000-1000-30 unit-mg-unit TbEF sm vit d3 125mcg 5000iu [id number]   TAKE ONE TABLET BY MOUTH EVERY DAY [facility], exp 6-21 FOR bones,skin AND healing     Review of patient's allergies indicates:  No Known Allergies    Patient Care Team:  Tarun Mcclellan MD as PCP - General (Family Medicine)    REVIEW OF SYSTEMS:  ROS  12 point review of systems conducted, negative except as stated in the history of present illness. See HPI for details.      Objective:     PHYSICAL EXAM:   Visit Vitals  /71 (BP Location: Right arm)   Pulse 83   Ht 5' (1.524 m)   Wt 88 kg (194 lb)   BMI 37.89 kg/m²       Vascular Physical Exam  Cardiovascular:      Pulses:           Radial pulses are 2+ on the right side.   Arteriovenous access:     Left arteriovenous access is present.        Left Access: Surgical AVF location(s): forearm cephalic vein. Forearm cephalic vein has thrill.      General: Alert and oriented, No acute distress.  Head: Normocephalic, Atraumatic.  Eye: Pupils are equal and round, Extraocular movements are intact, Sclera non-icteric.  Neck/Thyroid: Supple, Non-tender, No carotid bruit, No lymphadenopathy, No JVD, Full range of motion.  Respiratory: Non-labored respirations, Symmetrical chest wall expansion.  Cardiovascular: Regular rate and rhythm.  Gastrointestinal: Soft, Non-tender, Non-distended, Normal bowel sounds, No palpable organomegaly.  Integumentary: Warm, Dry, Intact, No suspicious lesions or rashes.  Neurologic: No focal deficits,   Psychiatric:  Normal interaction, Coherent speech, Euthymic mood, Appropriate affect.     Left forearm incision: well-healed      Imaging Reviewed:           Assessment/Plan:     Ms. Davonte Arauz 62 y.o. female chronic kidney disease who is s/p leftradiocephalic fistula revision on 4/3/23. On exam, access has a good thrill and no pulsatility. Left forearm incision is well-healed. OK to begin cannulation starting with 17g needles for 2 weeks, then progress needle size as tolerated once dialysis is initiated. Recommend continued 3 months surveillance of access, however can follow up prn if hemodialysis is initiated.      1. CKD (chronic kidney disease) stage 5, GFR less than 15 ml/min         Follow up in about 3 months (around 7/25/2023) for L RC AVF surveillance - CKD. In addition to their scheduled follow up, the patient has also been instructed to follow up on as needed basis.     Future Appointments   Date Time Provider Department Center   7/27/2023 11:30 AM Kala Rendon MD [facility]

## 2023-05-16 ENCOUNTER — TELEPHONE (OUTPATIENT)
Dept: TRANSPLANT | Facility: CLINIC | Age: 63
End: 2023-05-16
Payer: COMMERCIAL

## 2023-05-23 ENCOUNTER — TELEPHONE (OUTPATIENT)
Dept: TRANSPLANT | Facility: CLINIC | Age: 63
End: 2023-05-23
Payer: COMMERCIAL

## 2023-05-23 NOTE — TELEPHONE ENCOUNTER
Contacted patient to review initial intake information. Patient reports the followin. Can you walk up a flight of stairs without getting short of breath or stopping? Yes   2. Can you walk one block without getting short of breath or having to stop? Yes   3. Do you use oxygen? No   4. Do you use a cane, walker, or wheel chair to assist in mobility? No   5. Have you been hospitalized or had recent surgery in the last 6 months? No    A. Stroke   B. Heart surgery or heart catheterization   C. Broken bone  6. Do you have any cuts, open sores (ulcers), or wounds anywhere on your body? No   7. Do you go to dialysis? No   8. Preferred appointment day? Anyday  9. Caregiver? Granddaughter/Daughter     Patient is aware the next steps will include completing records and compliance verification. Patient is aware once provider review and insurance authorization is received we will contact patient to schedule initial visit. Patient is aware that initial visit will begin prior to / at 7 am and will conclude at approximately 3 pm on date of appointment. All questions answered at this time.

## 2023-05-25 ENCOUNTER — TELEPHONE (OUTPATIENT)
Dept: TRANSPLANT | Facility: CLINIC | Age: 63
End: 2023-05-25
Payer: COMMERCIAL

## 2023-05-26 ENCOUNTER — DOCUMENTATION ONLY (OUTPATIENT)
Dept: TRANSPLANT | Facility: CLINIC | Age: 63
End: 2023-05-26
Payer: COMMERCIAL

## 2023-05-26 ENCOUNTER — TELEPHONE (OUTPATIENT)
Dept: TRANSPLANT | Facility: CLINIC | Age: 63
End: 2023-05-26
Payer: COMMERCIAL

## 2023-05-26 DIAGNOSIS — Z76.82 ORGAN TRANSPLANT CANDIDATE: Primary | ICD-10-CM

## 2023-06-27 DIAGNOSIS — T82.590A MECHANICAL COMPLICATION OF ARTERIOVENOUS SHUNT SURGICALLY CREATED, INITIAL ENCOUNTER: Primary | ICD-10-CM

## 2023-06-27 NOTE — PROGRESS NOTES
Dr. Rendon was contacted by Ms. Arauz's nephrologist with concern of fistula size. She is in need of transition to hemodialysis in the near future. If fistula is note suitable for cannulation, tunneled catheter placement has been requested. Per Dr. Rendon, hemodialysis duplex ordered with clinic evaluation 6/29/23.

## 2023-06-28 NOTE — PROGRESS NOTES
[facility] - Clinic Note  Kala Rendon MD      Patient Name: Veronica Arauz                   : 1960     MRN: [id number]   Visit Date: 2023          History Present Illness     Reason for Visit: Hemodialysis Access       Ms. Arauz presents to the clinic for evaluation of her left radiocephalic fistula as requested by her nephrologist.  She is expect to start on hemodialysis in the near future and there are concerns that the fistula is immature/small which prompted referral to us.  Ultrasound of her fistula was obtained today.     Denies numbness or pain to left hand.   No other recent health changes.       REVIEW OF SYSTEMS:  ROS  12 point review of systems conducted, negative except as stated in the history of present illness. See HPI for details.        Physical Exam      Visit Vitals  BP (!) 172/72 (BP Location: Right arm)   Pulse 80   Ht 5' (1.524 m)   Wt 87.1 kg (192 lb)   BMI 37.50 kg/m²       General: well-nourished, no acute distress, and healthy appearing, ambulating normally, alert, pleasant, conversant, and oriented  Neurologic: cranial nerves are grossly intact, no neurologic deficits  HEENT: grossly normal and no scleral icterus  Neck/Chest: normal  and soft without lymphadenopathy  Respiratory: breathing easily and without respiratory distress  Abdomen: normal and soft  Cardiology: regular rate and rhythm    Upper Extremity Arterial Exam:   Right - radial is palpable  Left - radial is palpable    Dialysis Access:  left radiocephalic fistula  Assessment: good thrill    Musculoskeletal:   Upper Extremity: normal left hand function   Lower Extremity:  Right and left lower extremity have edema              Assessment and Plan     Ms. Arauz is a 63 y.o. with chronic kidney disease who is s/p left radiocephalic fistula revision on 23. Duplex obtained today reveals adequate size to the fistula with good size of 7-8 mm in diameter, flow volume of greater than 600, and good  depth for cannulation. On exam, access has a good thrill and no pulsatility.  OK to begin cannulation starting with 17g needles for 2 weeks, then progress needle size as tolerated once dialysis is initiated. Recommend continued 3 months surveillance of access, however she can follow up prn if hemodialysis is initiated.        1. Mechanical complication of arteriovenous shunt surgically created, initial encounter    2. CKD (chronic kidney disease) stage 5, GFR less than 15 ml/min           Imaging Obtained/Reviewed   Study: hemodialysis duplex  Date:   23           Medical History     Past Medical History:   Diagnosis Date    Anemia, unspecified     Chronic kidney disease, stage 5     Diabetes mellitus     Hyperlipidemia     Hypertension     Renal disorder      Past Surgical History:   Procedure Laterality Date    AV FISTULA PLACEMENT Left      SECTION      x2    COLONOSCOPY      HYSTERECTOMY      REVISION OF ARTERIOVENOUS FISTULA Left 4/3/2023    Procedure: REVISION, AV FISTULA;  Surgeon: Kala Rendon MD;  Location: [facility];  Service: Peripheral Vascular;  Laterality: Left;  LEFT RADIOCEPHALIC REVISION WITH SUPERFICIALIZATION // SUPINE //  AXILLARY BLOCK     Family History   Family history unknown: Yes     Social History     Socioeconomic History    Marital status:    Tobacco Use    Smoking status: Never    Smokeless tobacco: Never   Substance and Sexual Activity    Alcohol use: Never    Drug use: Never     Social Determinants of Health     Financial Resource Strain: Low Risk     Difficulty of Paying Living Expenses: Not hard at all   Food Insecurity: No Food Insecurity    Worried About Running Out of Food in the Last Year: Never true    Ran Out of Food in the Last Year: Never true   Transportation Needs: No Transportation Needs    Lack of Transportation (Medical): No    Lack of Transportation (Non-Medical): No   Physical Activity: Insufficiently Active    Days of Exercise per Week: 4 days     Minutes of Exercise per Session: 20 min   Stress: No Stress Concern Present    Feeling of Stress : Not at all   Social Connections: Socially Isolated    Frequency of Communication with Friends and Family: More than three times a week    Frequency of Social Gatherings with Friends and Family: More than three times a week    Attends Bahai Services: Never    Active Member of Clubs or Organizations: No    Attends Club or Organization Meetings: Never    Marital Status:    Housing Stability: Unknown    Unable to Pay for Housing in the Last Year: No    Number of Places Lived in the Last Year: 1     Current Outpatient Medications   Medication Instructions    allopurinoL (ZYLOPRIM) 100 mg, Oral, Daily    amLODIPine (NORVASC) 5 mg, Oral, Daily    atorvastatin (LIPITOR) 10 MG tablet atorvastatin 10 mg tablet   TAKE ONE TABLET BY MOUTH EVERY DAY    calcitRIOL (ROCALTROL) 0.5 MCG Cap calcitriol 0.5 mcg capsule   TAKE TWO CAPSULES BY MOUTH EVERY DAY / DOSAGE INCREASE Orally Once a day 30 days    calcitRIOL (ROCALTROL) 0.5 mcg, Oral, Daily    fenofibrate (TRICOR) 145 MG tablet fenofibrate nanocrystallized 145 mg tablet   TAKE ONE TABLET BY MOUTH ONCE DAILY to reduce triglycerides.    furosemide (LASIX) 80 mg, Oral, 2 times daily    glimepiride (AMARYL) 4 MG tablet glimepiride 4 mg tablet   TAKE ONE TABLET BY MOUTH IN THE MORNING FOR DIABETES    hydroCHLOROthiazide (HYDRODIURIL) 12.5 MG Tab hydrochlorothiazide 12.5 mg tablet   Take 1 tablet every day by oral route for 30 days.    HYDROcodone-acetaminophen (NORCO) 7.5-325 mg per tablet hydrocodone 7.5 mg-acetaminophen 325 mg tablet   Take 1 tablet every 4 hours by oral route as needed.    labetaloL (NORMODYNE) 100 mg, Oral, 2 times daily    loratadine (CLARITIN) 10 mg tablet loratadine 10 mg tablet   TAKE ONE TABLET BY MOUTH ONCE DAILY    losartan (COZAAR) 100 MG tablet losartan 100 mg tablet   TAKE 1/2 TABLET BY MOUTH EVERY DAY    losartan (COZAAR) 50 MG tablet TAKE  ONE TABLET BY MOUTH EVERY DAY FOR BLOOD PRESSURE Orally Once a day for 90 day(s)    losartan (COZAAR) 50 mg, Oral, Daily    metoprolol succinate (TOPROL-XL) 25 MG 24 hr tablet metoprolol succinate ER 25 mg tablet,extended release 24 hr   TAKE ONE TABLET BY MOUTH ONCE DAILY blood pressure AND heart rate    sodium bicarbonate 1,300 mg, Oral, 2 times daily    torsemide (DEMADEX) 10 MG Tab torsemide 10 mg tablet   TAKE ONE TABLET BY MOUTH EVERY DAY FOR FLUID    torsemide (DEMADEX) 20 MG Tab torsemide 20 mg tablet   2 tablet Orally Once a day 30 days    TRULICITY 1.5 mg, Subcutaneous, Every 7 days    TRULICITY 3 mg, Subcutaneous, Every 7 days    vit A,C and E-dietary suppl#12 5,000-1000-30 unit-mg-unit TbEF sm vit d3 125mcg 5000iu [id number]   TAKE ONE TABLET BY MOUTH EVERY DAY [facility], exp 6-21 FOR bones,skin AND healing     Review of patient's allergies indicates:  No Known Allergies    Patient Care Team:  Tarun Mcclellan MD as PCP - General (Family Medicine)      No follow-ups on file. In addition to their scheduled follow up, the patient has also been instructed to follow up on as needed basis.     Future Appointments   Date Time Provider Department Center   7/12/2023  7:00 AM LAB, TRANSPLANT [facility] LABTX [facility]   7/12/2023  8:00 AM KIDNEY, TRANSPLANT [facility] KIDNTX [facility]   7/12/2023  2:15 PM [facility] OIC-XRAY [facility] XRAY [facility]   7/12/2023  2:45 PM [facility] US TRANSPLANT ONLY [facility] ULTR [facility]   7/27/2023 11:30 AM Kala Rendon MD [facility]   10/5/2023 11:00 AM Kala Rendon MD [facility] VASR [facility]

## 2023-06-29 ENCOUNTER — OFFICE VISIT (OUTPATIENT)
Dept: VASCULAR SURGERY | Facility: CLINIC | Age: 63
End: 2023-06-29
Payer: COMMERCIAL

## 2023-06-29 VITALS
HEIGHT: 60 IN | BODY MASS INDEX: 37.69 KG/M2 | WEIGHT: 192 LBS | SYSTOLIC BLOOD PRESSURE: 172 MMHG | DIASTOLIC BLOOD PRESSURE: 72 MMHG | HEART RATE: 80 BPM

## 2023-06-29 DIAGNOSIS — N18.5 CKD (CHRONIC KIDNEY DISEASE) STAGE 5, GFR LESS THAN 15 ML/MIN: ICD-10-CM

## 2023-06-29 DIAGNOSIS — T82.590A MECHANICAL COMPLICATION OF ARTERIOVENOUS SHUNT SURGICALLY CREATED, INITIAL ENCOUNTER: Primary | ICD-10-CM

## 2023-06-29 PROCEDURE — 3078F DIAST BP <80 MM HG: CPT | Mod: CPTII,NTX,, | Performed by: SPECIALIST

## 2023-06-29 PROCEDURE — 3008F PR BODY MASS INDEX (BMI) DOCUMENTED: ICD-10-PCS | Mod: CPTII,NTX,, | Performed by: SPECIALIST

## 2023-06-29 PROCEDURE — 4010F ACE/ARB THERAPY RXD/TAKEN: CPT | Mod: CPTII,NTX,, | Performed by: SPECIALIST

## 2023-06-29 PROCEDURE — 1160F PR REVIEW ALL MEDS BY PRESCRIBER/CLIN PHARMACIST DOCUMENTED: ICD-10-PCS | Mod: CPTII,NTX,, | Performed by: SPECIALIST

## 2023-06-29 PROCEDURE — 1159F MED LIST DOCD IN RCRD: CPT | Mod: CPTII,NTX,, | Performed by: SPECIALIST

## 2023-06-29 PROCEDURE — 3066F NEPHROPATHY DOC TX: CPT | Mod: CPTII,NTX,, | Performed by: SPECIALIST

## 2023-06-29 PROCEDURE — 3078F PR MOST RECENT DIASTOLIC BLOOD PRESSURE < 80 MM HG: ICD-10-PCS | Mod: CPTII,NTX,, | Performed by: SPECIALIST

## 2023-06-29 PROCEDURE — 1160F RVW MEDS BY RX/DR IN RCRD: CPT | Mod: CPTII,NTX,, | Performed by: SPECIALIST

## 2023-06-29 PROCEDURE — 3008F BODY MASS INDEX DOCD: CPT | Mod: CPTII,NTX,, | Performed by: SPECIALIST

## 2023-06-29 PROCEDURE — 99024 POSTOP FOLLOW-UP VISIT: CPT | Mod: NTX,,, | Performed by: SPECIALIST

## 2023-06-29 PROCEDURE — 4010F PR ACE/ARB THEARPY RXD/TAKEN: ICD-10-PCS | Mod: CPTII,NTX,, | Performed by: SPECIALIST

## 2023-06-29 PROCEDURE — 3066F PR DOCUMENTATION OF TREATMENT FOR NEPHROPATHY: ICD-10-PCS | Mod: CPTII,NTX,, | Performed by: SPECIALIST

## 2023-06-29 PROCEDURE — 99024 PR POST-OP FOLLOW-UP VISIT: ICD-10-PCS | Mod: NTX,,, | Performed by: SPECIALIST

## 2023-06-29 PROCEDURE — 3077F PR MOST RECENT SYSTOLIC BLOOD PRESSURE >= 140 MM HG: ICD-10-PCS | Mod: CPTII,NTX,, | Performed by: SPECIALIST

## 2023-06-29 PROCEDURE — 1159F PR MEDICATION LIST DOCUMENTED IN MEDICAL RECORD: ICD-10-PCS | Mod: CPTII,NTX,, | Performed by: SPECIALIST

## 2023-06-29 PROCEDURE — 3077F SYST BP >= 140 MM HG: CPT | Mod: CPTII,NTX,, | Performed by: SPECIALIST

## 2023-06-29 RX ORDER — DULAGLUTIDE 3 MG/.5ML
3 INJECTION, SOLUTION SUBCUTANEOUS
COMMUNITY
End: 2023-10-05

## 2023-07-14 ENCOUNTER — TELEPHONE (OUTPATIENT)
Dept: TRANSPLANT | Facility: CLINIC | Age: 63
End: 2023-07-14
Payer: COMMERCIAL

## 2023-07-17 ENCOUNTER — TELEPHONE (OUTPATIENT)
Dept: TRANSPLANT | Facility: CLINIC | Age: 63
End: 2023-07-17
Payer: COMMERCIAL

## 2023-08-10 ENCOUNTER — TELEPHONE (OUTPATIENT)
Dept: TRANSPLANT | Facility: CLINIC | Age: 63
End: 2023-08-10
Payer: COMMERCIAL

## 2023-08-10 NOTE — TELEPHONE ENCOUNTER
----- Message from Deven Muprhy MA sent at 8/10/2023 10:19 AM CDT -----  Regarding: FW: call back    ----- Message -----  From: Gregory Faulkner  Sent: 8/10/2023   8:52 AM CDT  To: [facility] Pre-Kidney Transplant Non-Clinical  Subject: call back                                        Pt call to reschedule appt requesting call back    Call

## 2023-09-05 ENCOUNTER — TELEPHONE (OUTPATIENT)
Dept: TRANSPLANT | Facility: CLINIC | Age: 63
End: 2023-09-05
Payer: COMMERCIAL

## 2023-09-05 ENCOUNTER — HOSPITAL ENCOUNTER (INPATIENT)
Facility: HOSPITAL | Age: 63
LOS: 3 days | Discharge: HOME | DRG: 682 | End: 2023-09-08
Attending: EMERGENCY MEDICINE | Admitting: INTERNAL MEDICINE
Payer: COMMERCIAL

## 2023-09-05 DIAGNOSIS — N17.9 AKI (ACUTE KIDNEY INJURY): Primary | ICD-10-CM

## 2023-09-05 LAB
ALBUMIN SERPL-MCNC: 3.5 G/DL (ref 3.4–4.8)
ALBUMIN/GLOB SERPL: 0.9 RATIO (ref 1.1–2)
ALP SERPL-CCNC: 107 UNIT/L (ref 40–150)
ALT SERPL-CCNC: 14 UNIT/L (ref 0–55)
APPEARANCE UR: CLEAR
AST SERPL-CCNC: 14 UNIT/L (ref 5–34)
BACTERIA #/AREA URNS AUTO: ABNORMAL /HPF
BASOPHILS # BLD AUTO: 0.05 X10(3)/MCL
BASOPHILS NFR BLD AUTO: 0.7 %
BILIRUB SERPL-MCNC: 0.6 MG/DL
BILIRUB UR QL STRIP.AUTO: NEGATIVE
BUN SERPL-MCNC: 109.6 MG/DL (ref 9.8–20.1)
CALCIUM SERPL-MCNC: 8.7 MG/DL (ref 8.4–10.2)
CHLORIDE SERPL-SCNC: 111 MMOL/L (ref 98–107)
CO2 SERPL-SCNC: 16 MMOL/L (ref 23–31)
COLOR UR: YELLOW
CREAT SERPL-MCNC: 8.34 MG/DL (ref 0.55–1.02)
EOSINOPHIL # BLD AUTO: 0.21 X10(3)/MCL (ref 0–0.9)
EOSINOPHIL NFR BLD AUTO: 3.1 %
ERYTHROCYTE [DISTWIDTH] IN BLOOD BY AUTOMATED COUNT: 15.3 % (ref 11.5–17)
GFR SERPLBLD CREATININE-BSD FMLA CKD-EPI: 5 MLS/MIN/1.73/M2
GLOBULIN SER-MCNC: 3.7 GM/DL (ref 2.4–3.5)
GLUCOSE SERPL-MCNC: 97 MG/DL (ref 82–115)
GLUCOSE UR QL STRIP.AUTO: NEGATIVE
HCT VFR BLD AUTO: 28.5 % (ref 37–47)
HGB BLD-MCNC: 9.4 G/DL (ref 12–16)
IMM GRANULOCYTES # BLD AUTO: 0.03 X10(3)/MCL (ref 0–0.04)
IMM GRANULOCYTES NFR BLD AUTO: 0.4 %
KETONES UR QL STRIP.AUTO: NEGATIVE
LEUKOCYTE ESTERASE UR QL STRIP.AUTO: NEGATIVE
LYMPHOCYTES # BLD AUTO: 1.33 X10(3)/MCL (ref 0.6–4.6)
LYMPHOCYTES NFR BLD AUTO: 19.4 %
MCH RBC QN AUTO: 29.7 PG (ref 27–31)
MCHC RBC AUTO-ENTMCNC: 33 G/DL (ref 33–36)
MCV RBC AUTO: 89.9 FL (ref 80–94)
MONOCYTES # BLD AUTO: 0.67 X10(3)/MCL (ref 0.1–1.3)
MONOCYTES NFR BLD AUTO: 9.8 %
NEUTROPHILS # BLD AUTO: 4.56 X10(3)/MCL (ref 2.1–9.2)
NEUTROPHILS NFR BLD AUTO: 66.6 %
NITRITE UR QL STRIP.AUTO: NEGATIVE
NRBC BLD AUTO-RTO: 0 %
PH UR STRIP.AUTO: 6 [PH]
PLATELET # BLD AUTO: 264 X10(3)/MCL (ref 130–400)
PMV BLD AUTO: 11.1 FL (ref 7.4–10.4)
POTASSIUM SERPL-SCNC: 5.1 MMOL/L (ref 3.5–5.1)
PROT SERPL-MCNC: 7.2 GM/DL (ref 5.8–7.6)
PROT UR QL STRIP.AUTO: ABNORMAL
RBC # BLD AUTO: 3.17 X10(6)/MCL (ref 4.2–5.4)
RBC #/AREA URNS AUTO: ABNORMAL /HPF
RBC UR QL AUTO: ABNORMAL
SODIUM SERPL-SCNC: 138 MMOL/L (ref 136–145)
SP GR UR STRIP.AUTO: 1.01 (ref 1–1.03)
SQUAMOUS #/AREA URNS AUTO: ABNORMAL /HPF
UROBILINOGEN UR STRIP-ACNC: 0.2
WBC # SPEC AUTO: 6.85 X10(3)/MCL (ref 4.5–11.5)
WBC #/AREA URNS AUTO: ABNORMAL /HPF

## 2023-09-05 PROCEDURE — 11000001 HC ACUTE MED/SURG PRIVATE ROOM: Mod: NTX

## 2023-09-05 PROCEDURE — 85025 COMPLETE CBC W/AUTO DIFF WBC: CPT | Mod: NTX | Performed by: NURSE PRACTITIONER

## 2023-09-05 PROCEDURE — 63600175 PHARM REV CODE 636 W HCPCS: Mod: NTX | Performed by: STUDENT IN AN ORGANIZED HEALTH CARE EDUCATION/TRAINING PROGRAM

## 2023-09-05 PROCEDURE — 81001 URINALYSIS AUTO W/SCOPE: CPT | Mod: NTX | Performed by: NURSE PRACTITIONER

## 2023-09-05 PROCEDURE — 80053 COMPREHEN METABOLIC PANEL: CPT | Mod: NTX | Performed by: NURSE PRACTITIONER

## 2023-09-05 PROCEDURE — 96374 THER/PROPH/DIAG INJ IV PUSH: CPT | Mod: NTX

## 2023-09-05 PROCEDURE — 99291 CRITICAL CARE FIRST HOUR: CPT | Mod: NTX

## 2023-09-05 RX ORDER — HYDRALAZINE HYDROCHLORIDE 20 MG/ML
10 INJECTION INTRAMUSCULAR; INTRAVENOUS
Status: COMPLETED | OUTPATIENT
Start: 2023-09-05 | End: 2023-09-05

## 2023-09-05 RX ADMIN — SODIUM CHLORIDE, POTASSIUM CHLORIDE, SODIUM LACTATE AND CALCIUM CHLORIDE 500 ML: 600; 310; 30; 20 INJECTION, SOLUTION INTRAVENOUS at 10:09

## 2023-09-05 RX ADMIN — HYDRALAZINE HYDROCHLORIDE 10 MG: 20 INJECTION INTRAMUSCULAR; INTRAVENOUS at 10:09

## 2023-09-05 NOTE — FIRST PROVIDER EVALUATION
Medical screening examination initiated.  I have conducted a focused provider triage encounter, findings are as follows:    Brief history of present illness:  Patient states that her PCP sent her to the ED due to her BUN & Creat being elevated.     There were no vitals filed for this visit.    Pertinent physical exam:  Awake, alert, ambulatory      Brief workup plan:  Labs    Preliminary workup initiated; this workup will be continued and followed by the physician or advanced practice provider that is assigned to the patient when roomed.

## 2023-09-06 ENCOUNTER — TELEPHONE (OUTPATIENT)
Dept: TRANSPLANT | Facility: CLINIC | Age: 63
End: 2023-09-06
Payer: COMMERCIAL

## 2023-09-06 LAB
ALBUMIN SERPL-MCNC: 3.4 G/DL (ref 3.4–4.8)
ALBUMIN/GLOB SERPL: 1 RATIO (ref 1.1–2)
ALP SERPL-CCNC: 101 UNIT/L (ref 40–150)
ALT SERPL-CCNC: 11 UNIT/L (ref 0–55)
AST SERPL-CCNC: 13 UNIT/L (ref 5–34)
BASOPHILS # BLD AUTO: 0.04 X10(3)/MCL
BASOPHILS NFR BLD AUTO: 0.6 %
BILIRUB SERPL-MCNC: 0.6 MG/DL
BUN SERPL-MCNC: 100.5 MG/DL (ref 9.8–20.1)
CALCIUM SERPL-MCNC: 8.6 MG/DL (ref 8.4–10.2)
CHLORIDE SERPL-SCNC: 111 MMOL/L (ref 98–107)
CO2 SERPL-SCNC: 16 MMOL/L (ref 23–31)
CREAT SERPL-MCNC: 8.28 MG/DL (ref 0.55–1.02)
EOSINOPHIL # BLD AUTO: 0.2 X10(3)/MCL (ref 0–0.9)
EOSINOPHIL NFR BLD AUTO: 2.8 %
ERYTHROCYTE [DISTWIDTH] IN BLOOD BY AUTOMATED COUNT: 14.9 % (ref 11.5–17)
EST. AVERAGE GLUCOSE BLD GHB EST-MCNC: 91.1 MG/DL
GFR SERPLBLD CREATININE-BSD FMLA CKD-EPI: 5 MLS/MIN/1.73/M2
GLOBULIN SER-MCNC: 3.4 GM/DL (ref 2.4–3.5)
GLUCOSE SERPL-MCNC: 102 MG/DL (ref 82–115)
HBA1C MFR BLD: 4.8 %
HBV SURFACE AG SERPL QL IA: NONREACTIVE
HCT VFR BLD AUTO: 27 % (ref 37–47)
HGB BLD-MCNC: 9.2 G/DL (ref 12–16)
IMM GRANULOCYTES # BLD AUTO: 0.02 X10(3)/MCL (ref 0–0.04)
IMM GRANULOCYTES NFR BLD AUTO: 0.3 %
LYMPHOCYTES # BLD AUTO: 1.1 X10(3)/MCL (ref 0.6–4.6)
LYMPHOCYTES NFR BLD AUTO: 15.4 %
MAGNESIUM SERPL-MCNC: 1.7 MG/DL (ref 1.6–2.6)
MCH RBC QN AUTO: 30.4 PG (ref 27–31)
MCHC RBC AUTO-ENTMCNC: 34.1 G/DL (ref 33–36)
MCV RBC AUTO: 89.1 FL (ref 80–94)
MONOCYTES # BLD AUTO: 0.64 X10(3)/MCL (ref 0.1–1.3)
MONOCYTES NFR BLD AUTO: 9 %
NEUTROPHILS # BLD AUTO: 5.13 X10(3)/MCL (ref 2.1–9.2)
NEUTROPHILS NFR BLD AUTO: 71.9 %
NRBC BLD AUTO-RTO: 0 %
PHOSPHATE SERPL-MCNC: 6.7 MG/DL (ref 2.3–4.7)
PLATELET # BLD AUTO: 257 X10(3)/MCL (ref 130–400)
PMV BLD AUTO: 10.6 FL (ref 7.4–10.4)
POCT GLUCOSE: 169 MG/DL (ref 70–110)
POCT GLUCOSE: 70 MG/DL (ref 70–110)
POCT GLUCOSE: 93 MG/DL (ref 70–110)
POTASSIUM SERPL-SCNC: 4.7 MMOL/L (ref 3.5–5.1)
PROT SERPL-MCNC: 6.8 GM/DL (ref 5.8–7.6)
RBC # BLD AUTO: 3.03 X10(6)/MCL (ref 4.2–5.4)
SODIUM SERPL-SCNC: 138 MMOL/L (ref 136–145)
WBC # SPEC AUTO: 7.13 X10(3)/MCL (ref 4.5–11.5)

## 2023-09-06 PROCEDURE — 90935 HEMODIALYSIS ONE EVALUATION: CPT | Mod: NTX

## 2023-09-06 PROCEDURE — 87340 HEPATITIS B SURFACE AG IA: CPT | Mod: NTX | Performed by: NURSE PRACTITIONER

## 2023-09-06 PROCEDURE — 80053 COMPREHEN METABOLIC PANEL: CPT | Mod: NTX | Performed by: NURSE PRACTITIONER

## 2023-09-06 PROCEDURE — 83735 ASSAY OF MAGNESIUM: CPT | Mod: NTX | Performed by: NURSE PRACTITIONER

## 2023-09-06 PROCEDURE — 85025 COMPLETE CBC W/AUTO DIFF WBC: CPT | Mod: NTX | Performed by: NURSE PRACTITIONER

## 2023-09-06 PROCEDURE — 84100 ASSAY OF PHOSPHORUS: CPT | Mod: NTX | Performed by: NURSE PRACTITIONER

## 2023-09-06 PROCEDURE — 63600175 PHARM REV CODE 636 W HCPCS: Mod: NTX | Performed by: NURSE PRACTITIONER

## 2023-09-06 PROCEDURE — 11000001 HC ACUTE MED/SURG PRIVATE ROOM: Mod: NTX

## 2023-09-06 PROCEDURE — 25000003 PHARM REV CODE 250: Mod: NTX | Performed by: STUDENT IN AN ORGANIZED HEALTH CARE EDUCATION/TRAINING PROGRAM

## 2023-09-06 PROCEDURE — 63600175 PHARM REV CODE 636 W HCPCS: Mod: NTX | Performed by: STUDENT IN AN ORGANIZED HEALTH CARE EDUCATION/TRAINING PROGRAM

## 2023-09-06 PROCEDURE — 86706 HEP B SURFACE ANTIBODY: CPT | Mod: NTX | Performed by: NURSE PRACTITIONER

## 2023-09-06 PROCEDURE — 83036 HEMOGLOBIN GLYCOSYLATED A1C: CPT | Mod: NTX | Performed by: PHYSICIAN ASSISTANT

## 2023-09-06 PROCEDURE — 21400001 HC TELEMETRY ROOM: Mod: NTX

## 2023-09-06 RX ORDER — AMLODIPINE BESYLATE 5 MG/1
5 TABLET ORAL DAILY
Status: DISCONTINUED | OUTPATIENT
Start: 2023-09-06 | End: 2023-09-07

## 2023-09-06 RX ORDER — PROCHLORPERAZINE EDISYLATE 5 MG/ML
5 INJECTION INTRAMUSCULAR; INTRAVENOUS EVERY 6 HOURS PRN
Status: DISCONTINUED | OUTPATIENT
Start: 2023-09-06 | End: 2023-09-08 | Stop reason: HOSPADM

## 2023-09-06 RX ORDER — IBUPROFEN 200 MG
24 TABLET ORAL
Status: DISCONTINUED | OUTPATIENT
Start: 2023-09-06 | End: 2023-09-08 | Stop reason: HOSPADM

## 2023-09-06 RX ORDER — MAG HYDROX/ALUMINUM HYD/SIMETH 200-200-20
30 SUSPENSION, ORAL (FINAL DOSE FORM) ORAL EVERY 4 HOURS PRN
Status: DISCONTINUED | OUTPATIENT
Start: 2023-09-06 | End: 2023-09-08 | Stop reason: HOSPADM

## 2023-09-06 RX ORDER — LABETALOL HYDROCHLORIDE 5 MG/ML
20 INJECTION, SOLUTION INTRAVENOUS EVERY 4 HOURS PRN
Status: DISCONTINUED | OUTPATIENT
Start: 2023-09-06 | End: 2023-09-08 | Stop reason: HOSPADM

## 2023-09-06 RX ORDER — ONDANSETRON 2 MG/ML
4 INJECTION INTRAMUSCULAR; INTRAVENOUS EVERY 4 HOURS PRN
Status: DISCONTINUED | OUTPATIENT
Start: 2023-09-06 | End: 2023-09-08 | Stop reason: HOSPADM

## 2023-09-06 RX ORDER — SODIUM CHLORIDE 9 MG/ML
INJECTION, SOLUTION INTRAVENOUS ONCE
Status: CANCELLED | OUTPATIENT
Start: 2023-09-06 | End: 2023-09-06

## 2023-09-06 RX ORDER — LABETALOL 100 MG/1
100 TABLET, FILM COATED ORAL 2 TIMES DAILY
Status: DISCONTINUED | OUTPATIENT
Start: 2023-09-06 | End: 2023-09-07

## 2023-09-06 RX ORDER — BISACODYL 10 MG
10 SUPPOSITORY, RECTAL RECTAL DAILY PRN
Status: DISCONTINUED | OUTPATIENT
Start: 2023-09-06 | End: 2023-09-08 | Stop reason: HOSPADM

## 2023-09-06 RX ORDER — IBUPROFEN 200 MG
16 TABLET ORAL
Status: DISCONTINUED | OUTPATIENT
Start: 2023-09-06 | End: 2023-09-08 | Stop reason: HOSPADM

## 2023-09-06 RX ORDER — ATORVASTATIN CALCIUM 40 MG/1
40 TABLET, FILM COATED ORAL DAILY
Status: DISCONTINUED | OUTPATIENT
Start: 2023-09-06 | End: 2023-09-08 | Stop reason: HOSPADM

## 2023-09-06 RX ORDER — ALLOPURINOL 100 MG/1
100 TABLET ORAL DAILY
Status: DISCONTINUED | OUTPATIENT
Start: 2023-09-06 | End: 2023-09-08 | Stop reason: HOSPADM

## 2023-09-06 RX ORDER — ACETAMINOPHEN 325 MG/1
650 TABLET ORAL EVERY 4 HOURS PRN
Status: DISCONTINUED | OUTPATIENT
Start: 2023-09-06 | End: 2023-09-08 | Stop reason: HOSPADM

## 2023-09-06 RX ORDER — MUPIROCIN 20 MG/G
OINTMENT TOPICAL 2 TIMES DAILY
Status: DISCONTINUED | OUTPATIENT
Start: 2023-09-06 | End: 2023-09-08 | Stop reason: HOSPADM

## 2023-09-06 RX ORDER — GLUCAGON 1 MG
1 KIT INJECTION
Status: DISCONTINUED | OUTPATIENT
Start: 2023-09-06 | End: 2023-09-08 | Stop reason: HOSPADM

## 2023-09-06 RX ORDER — HEPARIN SODIUM 5000 [USP'U]/ML
5000 INJECTION, SOLUTION INTRAVENOUS; SUBCUTANEOUS EVERY 8 HOURS
Status: DISCONTINUED | OUTPATIENT
Start: 2023-09-06 | End: 2023-09-08 | Stop reason: HOSPADM

## 2023-09-06 RX ORDER — INSULIN ASPART 100 [IU]/ML
0-10 INJECTION, SOLUTION INTRAVENOUS; SUBCUTANEOUS
Status: DISCONTINUED | OUTPATIENT
Start: 2023-09-06 | End: 2023-09-08 | Stop reason: HOSPADM

## 2023-09-06 RX ORDER — SODIUM CHLORIDE 0.9 % (FLUSH) 0.9 %
10 SYRINGE (ML) INJECTION
Status: DISCONTINUED | OUTPATIENT
Start: 2023-09-06 | End: 2023-09-08 | Stop reason: HOSPADM

## 2023-09-06 RX ORDER — SODIUM CHLORIDE 9 MG/ML
INJECTION, SOLUTION INTRAVENOUS
Status: CANCELLED | OUTPATIENT
Start: 2023-09-06

## 2023-09-06 RX ADMIN — LABETALOL HYDROCHLORIDE 20 MG: 5 INJECTION INTRAVENOUS at 05:09

## 2023-09-06 RX ADMIN — HEPARIN SODIUM 5000 UNITS: 5000 INJECTION, SOLUTION INTRAVENOUS; SUBCUTANEOUS at 08:09

## 2023-09-06 RX ADMIN — LABETALOL HYDROCHLORIDE 100 MG: 100 TABLET, FILM COATED ORAL at 10:09

## 2023-09-06 RX ADMIN — LABETALOL HYDROCHLORIDE 100 MG: 100 TABLET, FILM COATED ORAL at 08:09

## 2023-09-06 RX ADMIN — HEPARIN SODIUM 5000 UNITS: 5000 INJECTION, SOLUTION INTRAVENOUS; SUBCUTANEOUS at 06:09

## 2023-09-06 RX ADMIN — ATORVASTATIN CALCIUM 40 MG: 40 TABLET, FILM COATED ORAL at 10:09

## 2023-09-06 RX ADMIN — ALLOPURINOL 100 MG: 100 TABLET ORAL at 10:09

## 2023-09-06 RX ADMIN — AMLODIPINE BESYLATE 5 MG: 5 TABLET ORAL at 10:09

## 2023-09-06 NOTE — NURSING
Nurses Note -- 4 Eyes      9/6/2023   4:49 PM      Skin assessed during: Admit      [x] No Altered Skin Integrity Present    []Prevention Measures Documented      [] Yes- Altered Skin Integrity Present or Discovered   [] LDA Added if Not in Epic (Describe Wound)   [] New Altered Skin Integrity was Present on Admit and Documented in LDA   [] Wound Image Taken    Wound Care Consulted? No    Attending Nurse:  Yashira Marquez RN/Staff Member:   Ghazal Winkler CNA

## 2023-09-06 NOTE — ED PROVIDER NOTES
Encounter Date: 2023    SCRIBE #1 NOTE: I, Pratik Stein, am scribing for, and in the presence of,  Keanu Loya MD. I have scribed the following portions of the note - Other sections scribed: HPI, ROS, PE, MDM.       History     Chief Complaint   Patient presents with    Abnormal Lab     Sent by nephrologist for elevated BUN & creatinine. Hx CKD - not on dialysis. Denies complaints at this time.     A 64 y/o female with a history of CKD not on dialysis, HLD, HTN, anemia, and DM presents to [facility] ED upon recommendation from nephrology for abnormal labs: elevated BUN and creatinine. Patient reports that she has mild lower extremity edema.   Patient denies shortness of breath, weakness, nausea, pain, tingling, numbness, dysuria, and hematuria.     Nephrology: Sanya Waterman MD    The history is provided by the patient, medical records and a relative. No  was used.     Review of patient's allergies indicates:  No Known Allergies  Past Medical History:   Diagnosis Date    Anemia, unspecified     Chronic kidney disease, stage 5     Diabetes mellitus     Hyperlipidemia     Hypertension     Renal disorder      Past Surgical History:   Procedure Laterality Date    AV FISTULA PLACEMENT Left      SECTION      x2    COLONOSCOPY      HYSTERECTOMY      REVISION OF ARTERIOVENOUS FISTULA Left 4/3/2023    Procedure: REVISION, AV FISTULA;  Surgeon: Kala Rendon MD;  Location: [facility];  Service: Peripheral Vascular;  Laterality: Left;  LEFT RADIOCEPHALIC REVISION WITH SUPERFICIALIZATION // SUPINE //  AXILLARY BLOCK     Family History   Family history unknown: Yes     Social History     Tobacco Use    Smoking status: Never    Smokeless tobacco: Never   Substance Use Topics    Alcohol use: Never    Drug use: Never     Review of Systems   Constitutional:  Negative for chills and fever.   HENT:  Negative for congestion, drooling and sore throat.    Eyes:  Negative for pain and visual  disturbance.   Respiratory:  Negative for chest tightness, shortness of breath and wheezing.    Cardiovascular:  Positive for leg swelling. Negative for chest pain and palpitations.   Gastrointestinal:  Negative for abdominal pain, nausea and vomiting.   Genitourinary:  Negative for dysuria and hematuria.   Musculoskeletal:  Negative for back pain, neck pain and neck stiffness.   Skin:  Negative for pallor and rash.   Neurological:  Negative for weakness and numbness.   Hematological:  Does not bruise/bleed easily.       Physical Exam     Initial Vitals [09/05/23 1740]   BP Pulse Resp Temp SpO2   (!) 186/78 84 18 98.2 °F (36.8 °C) 96 %      MAP       --         Physical Exam    Nursing note and vitals reviewed.  Constitutional: She appears well-developed and well-nourished. She is not diaphoretic. No distress.   Patient is well appearing.    HENT:   Head: Normocephalic and atraumatic.   Nose: Nose normal.   Mouth/Throat: Oropharynx is clear and moist.   Eyes: EOM are normal. Pupils are equal, round, and reactive to light.   Neck: Neck supple.   Normal range of motion.  Cardiovascular:  Normal rate and regular rhythm.           No murmur heard.  Pulses:       Radial pulses are 2+ on the right side and 2+ on the left side.        Dorsalis pedis pulses are 2+ on the right side and 2+ on the left side.   Patient has trace lower extremity edema bilaterally.    Pulmonary/Chest: Breath sounds normal. No respiratory distress. She has no wheezes. She has no rales.   Abdominal: Abdomen is soft. She exhibits no distension. There is no abdominal tenderness.   Musculoskeletal:      Cervical back: Normal range of motion and neck supple.      Comments: Patient has dialysis access to left forearm with good thrill and good bruit.      Neurological: She is alert and oriented to person, place, and time. She has normal strength. No cranial nerve deficit or sensory deficit.   Patient has intact sensation to left hand.    Skin: Skin is  warm. Capillary refill takes less than 2 seconds. No rash noted.         ED Course   Critical Care    Date/Time: 9/5/2023 11:59 PM    Performed by: Keanu Loya MD  Authorized by: Keanu Loya MD  Direct patient critical care time: 35 minutes  Total critical care time (exclusive of procedural time) : 35 minutes  Critical care time was exclusive of separately billable procedures and treating other patients.  Critical care was necessary to treat or prevent imminent or life-threatening deterioration of the following conditions: renal failure.  Critical care was time spent personally by me on the following activities: development of treatment plan with patient or surrogate, discussions with consultants, evaluation of patient's response to treatment, examination of patient, obtaining history from patient or surrogate, ordering and performing treatments and interventions, ordering and review of laboratory studies, ordering and review of radiographic studies, pulse oximetry and re-evaluation of patient's condition.        Labs Reviewed   COMPREHENSIVE METABOLIC PANEL - Abnormal; Notable for the following components:       Result Value    Chloride 111 (*)     Carbon Dioxide 16 (*)     Blood Urea Nitrogen 109.6 (*)     Creatinine 8.34 (*)     Globulin 3.7 (*)     Albumin/Globulin Ratio 0.9 (*)     All other components within normal limits   URINALYSIS, REFLEX TO URINE CULTURE - Abnormal; Notable for the following components:    Protein, UA 3+ (*)     Blood, UA 1+ (*)     All other components within normal limits   CBC WITH DIFFERENTIAL - Abnormal; Notable for the following components:    RBC 3.17 (*)     Hgb 9.4 (*)     Hct 28.5 (*)     MPV 11.1 (*)     All other components within normal limits   URINALYSIS, MICROSCOPIC - Abnormal; Notable for the following components:    Bacteria, UA Many (*)     All other components within normal limits   COMPREHENSIVE METABOLIC PANEL - Abnormal; Notable for the following  components:    Chloride 111 (*)     Carbon Dioxide 16 (*)     Blood Urea Nitrogen 100.5 (*)     Creatinine 8.28 (*)     Albumin/Globulin Ratio 1.0 (*)     All other components within normal limits   PHOSPHORUS - Abnormal; Notable for the following components:    Phosphorus Level 6.7 (*)     All other components within normal limits   CBC WITH DIFFERENTIAL - Abnormal; Notable for the following components:    RBC 3.03 (*)     Hgb 9.2 (*)     Hct 27.0 (*)     MPV 10.6 (*)     All other components within normal limits   MAGNESIUM - Normal   HEPATITIS B SURFACE ANTIGEN - Normal   CBC W/ AUTO DIFFERENTIAL    Narrative:     The following orders were created for panel order CBC Auto Differential.  Procedure                               Abnormality         Status                     ---------                               -----------         ------                     CBC with Differential[[id number]]       Abnormal            Final result                 Please view results for these tests on the individual orders.   CBC W/ AUTO DIFFERENTIAL    Narrative:     The following orders were created for panel order CBC Auto Differential.  Procedure                               Abnormality         Status                     ---------                               -----------         ------                     CBC with Differential[[id number]]       Abnormal            Final result                 Please view results for these tests on the individual orders.   HEMOGLOBIN A1C   POCT GLUCOSE   POCT GLUCOSE          Imaging Results              X-Ray Chest 1 View (Final result)  Result time 09/06/23 12:30:01      Final result by Edward Flood MD (09/06/23 12:30:01)                   Impression:      Mild cardiomegaly.    Otherwise no active pulmonary disease      Electronically signed by: Edward Flood  Date:    09/06/2023  Time:    12:30               Narrative:    EXAMINATION:  XR CHEST 1 VIEW    CPT 04681    CLINICAL  HISTORY:  outpatient dialysis placement requirement;    COMPARISON:  March 20, 2023    FINDINGS:  Examination reveals mediastinal silhouette to be within normal limits cardiac silhouette is enlarged lung fields are clear and free of gross infiltrates or effusions.  Some linear densities identified in the left mid lung zone either represent subsegmental atelectatic changes and or fibrotic streaks.    No other abnormalities                        Final result by Edward Flood MD (09/06/23 12:30:01)                   Impression:      Mild cardiomegaly.    Otherwise no active pulmonary disease      Electronically signed by: Edward Flood  Date:    09/06/2023  Time:    12:30               Narrative:    EXAMINATION:  XR CHEST 1 VIEW    CPT 57601    CLINICAL HISTORY:  outpatient dialysis placement requirement;    COMPARISON:  March 20, 2023    FINDINGS:  Examination reveals mediastinal silhouette to be within normal limits cardiac silhouette is enlarged lung fields are clear and free of gross infiltrates or effusions.  Some linear densities identified in the left mid lung zone either represent subsegmental atelectatic changes and or fibrotic streaks.    No other abnormalities                        Final result by Edward Flood MD (09/06/23 12:30:01)                   Impression:      Mild cardiomegaly.    Otherwise no active pulmonary disease      Electronically signed by: Edward Flood  Date:    09/06/2023  Time:    12:30               Narrative:    EXAMINATION:  XR CHEST 1 VIEW    CPT 55245    CLINICAL HISTORY:  outpatient dialysis placement requirement;    COMPARISON:  March 20, 2023    FINDINGS:  Examination reveals mediastinal silhouette to be within normal limits cardiac silhouette is enlarged lung fields are clear and free of gross infiltrates or effusions.  Some linear densities identified in the left mid lung zone either represent subsegmental atelectatic changes and or fibrotic streaks.    No  other abnormalities                        Final result by Edward Flood MD (09/06/23 12:30:01)                   Impression:      Mild cardiomegaly.    Otherwise no active pulmonary disease      Electronically signed by: Edward Flood  Date:    09/06/2023  Time:    12:30               Narrative:    EXAMINATION:  XR CHEST 1 VIEW    CPT 67201    CLINICAL HISTORY:  outpatient dialysis placement requirement;    COMPARISON:  March 20, 2023    FINDINGS:  Examination reveals mediastinal silhouette to be within normal limits cardiac silhouette is enlarged lung fields are clear and free of gross infiltrates or effusions.  Some linear densities identified in the left mid lung zone either represent subsegmental atelectatic changes and or fibrotic streaks.    No other abnormalities                                       Medications   lactated ringers bolus 500 mL (0 mLs Intravenous Stopped 9/5/23 4285)   hydrALAZINE injection 10 mg (10 mg Intravenous Given 9/5/23 8875)     Medical Decision Making  Risk  Prescription drug management.  Decision regarding hospitalization.            Scribe Attestation:   Scribe #1: I performed the above scribed service and the documentation accurately describes the services I performed. I attest to the accuracy of the note.    Attending Attestation:           Physician Attestation for Scribe:  Physician Attestation Statement for Scribe #1: I, Keanu Loya MD, reviewed documentation, as scribed by Pratik Stein in my presence, and it is both accurate and complete.                   Differential diagnosis (includes but is not limited to):   BASSEM, electrolyte abnormalities, dehydration, kidney injury    MDM Narrative  63-year-old female presents after being referred to the emergency department for admission for steadily worsening acute on chronic kidney injury.  Patient denies any symptoms currently aside from some mild generalized weakness.  Labs are pending.  Gentle IV fluid  rehydration ordered.  Urinalysis pending.  Nephrology consulted, appreciate recommendations.    Update:  Nephrology will follow once admitted.  Creatinine does continue to worsen.  Hospital Medicine consulted and has accepted the patient for admission.    Dispo:  Admit    My independent radiology interpretation:  As above  Point of care US (independently performed and interpreted):   Decision rules/clinical scoring:     Sepsis Perfusion Assessment:     Amount and/or Complexity of Data Reviewed  Independent historian:  Family members   Summary of history:  History corroborated by the patient's family members who are present at the bedside  External data reviewed: notes from previous admissions, notes from previous ED visits, and notes from clinic visits  Summary of data reviewed:  Prior notes reviewed in the electronic medical record  Risk and benefits of testing: discussed   Labs: ordered and reviewed  Radiology: ordered and independent interpretation performed (see above or ED course)  ECG/medicine tests: ordered and independent interpretation performed (see above or ED course)  Discussion of management or test interpretation with external provider(s): discussed with hospitalist physician and discussed with nephro consultant   Summary of discussion:  As above    Risk  Drug therapy requiring intense monitoring for toxicity   Decision regarding hospitalization  Shared decision making     Critical Care  none    Data Reviewed/Counseling: I have personally reviewed the patient's vital signs, nursing notes, and other relevant tests, information, and imaging. I had a detailed discussion regarding the historical points, exam findings, and any diagnostic results supporting the discharge diagnosis. I personally performed the history, PE, MDM and procedures as documented above and agree with the scribe's documentation.    Portions of this note were dictated using voice recognition software. Although it was reviewed for  accuracy, some inherent voice recognition errors may have occurred and may be present in this document.            Clinical Impression:   Final diagnoses:  [N17.9] BASSEM (acute kidney injury) (Primary)        ED Disposition Condition    Admit Stable                Keanu Loya MD  09/10/23 7590

## 2023-09-06 NOTE — H&P
[facility]  Hospital Medicine History & Physical Examination       Patient Name: Veronica Arauz  MRN: [id number]  Patient Class: IP- Inpatient   Admission Date: 2023   Admitting Physician: Stanley Suggs MD  Length of Stay: 1  Attending Physician: William Winn MD   Primary Care Provider: Tarun Mcclellan MD  Face-to-Face encounter date: 2023  Code Status:Full code   Chief Complaint: Abnormal Lab (Sent by nephrologist for elevated BUN & creatinine. Hx CKD - not on dialysis. Denies complaints at this time.)        Patient information was obtained from patient, patient's family, past medical records and ER records.     HISTORY OF PRESENT ILLNESS:   Veronica Arauz is a 63 y.o. female with a past medical history of essential hypertension, hyperlipidemia, CKD stage 5, diabetes mellitus type 2, and anemia presented to [facility] on 2023 for abnormal lab work.  Patient reports she was sent to ED by her nephrologist Dr. Waterman secondary to worsening kidney function.  Patient also endorsed lower extremity edema for the past week.  Patient denied shortness of breath, chest pain, weakness, dizziness, syncope, vomiting, dysuria, hematuria, fever, chills, diarrhea, rectal bleeding, and dark stools.  Initial vital signs in ED were /78, pulse 84, respirations 18, temperature 36.8° C, and SpO2 96% on room air.  Labs revealed WBC 6.85, RBC 3.17, hemoglobin 9.4, hematocrit 28.5, MCV 89.9, chloride 111, CO2 16, .6, and creatinine 8.34.  UA revealed 3+ protein, 1+ occult blood, and many bacteria.  Nephrology was consulted with plans for hemodialysis. Patient was admitted to hospital medicine service for further medical management.     PAST MEDICAL HISTORY:   Essential hypertension  Hyperlipidemia  CKD stage 5   Diabetes mellitus type 2   Anemia    PAST SURGICAL HISTORY:     Past Surgical History:   Procedure Laterality Date    AV FISTULA PLACEMENT Left      SECTION       x2    COLONOSCOPY      HYSTERECTOMY      REVISION OF ARTERIOVENOUS FISTULA Left 4/3/2023    Procedure: REVISION, AV FISTULA;  Surgeon: Kala Rendon MD;  Location: [facility] OR;  Service: Peripheral Vascular;  Laterality: Left;  LEFT RADIOCEPHALIC REVISION WITH SUPERFICIALIZATION // SUPINE //  AXILLARY BLOCK       ALLERGIES:   Patient has no known allergies.    FAMILY HISTORY:   Mother:  ESRD on HD    SOCIAL HISTORY:   Denies tobacco, drug, and alcohol use    HOME MEDICATIONS:     Prior to Admission medications    Medication Sig Start Date End Date Taking? Authorizing Provider   allopurinoL (ZYLOPRIM) 100 MG tablet Take 100 mg by mouth once daily.   Yes Provider, Historical   amLODIPine (NORVASC) 5 MG tablet Take 5 mg by mouth once daily. 3/1/23  Yes Provider, Historical   calcitRIOL (ROCALTROL) 0.5 MCG Cap calcitriol 0.5 mcg capsule   TAKE TWO CAPSULES BY MOUTH EVERY DAY / DOSAGE INCREASE Orally Once a day 30 days   Yes Provider, Historical   furosemide (LASIX) 80 MG tablet Take 1 tablet (80 mg total) by mouth 2 (two) times daily. 3/22/23 3/21/24 Yes Ronal Peñaloza DO   labetaloL (NORMODYNE) 100 MG tablet Take 1 tablet (100 mg total) by mouth 2 (two) times daily. 3/22/23 3/21/24 Yes Ronal Peñaloza DO   losartan (COZAAR) 50 MG tablet Take 50 mg by mouth once daily. 3/1/23  Yes Provider, Historical   sodium bicarbonate 650 MG tablet Take 2 tablets (1,300 mg total) by mouth 2 (two) times daily. 3/22/23 3/21/24 Yes Ronal Peñaloza DO   TRULICITY 1.5 mg/0.5 mL pen injector Inject 1.5 mg into the skin every 7 days. 1/27/23  Yes Provider, Historical   atorvastatin (LIPITOR) 10 MG tablet atorvastatin 10 mg tablet   TAKE ONE TABLET BY MOUTH EVERY DAY    Provider, Historical   calcitRIOL (ROCALTROL) 0.25 MCG Cap Take 0.5 mcg by mouth once daily. 12/1/22   Provider, Historical   dulaglutide (TRULICITY) 3 mg/0.5 mL pen injector Inject 3 mg into the skin every 7 days.    Provider, Historical   fenofibrate (TRICOR) 145 MG tablet  fenofibrate nanocrystallized 145 mg tablet   TAKE ONE TABLET BY MOUTH ONCE DAILY to reduce triglycerides.    Provider, Historical   glimepiride (AMARYL) 4 MG tablet glimepiride 4 mg tablet   TAKE ONE TABLET BY MOUTH IN THE MORNING FOR DIABETES    Provider, Historical   hydroCHLOROthiazide (HYDRODIURIL) 12.5 MG Tab hydrochlorothiazide 12.5 mg tablet   Take 1 tablet every day by oral route for 30 days.    Provider, Historical   HYDROcodone-acetaminophen (NORCO) 7.5-325 mg per tablet hydrocodone 7.5 mg-acetaminophen 325 mg tablet   Take 1 tablet every 4 hours by oral route as needed.    Provider, Historical   loratadine (CLARITIN) 10 mg tablet loratadine 10 mg tablet   TAKE ONE TABLET BY MOUTH ONCE DAILY    Provider, Historical   losartan (COZAAR) 100 MG tablet losartan 100 mg tablet   TAKE 1/2 TABLET BY MOUTH EVERY DAY    Provider, Historical   losartan (COZAAR) 50 MG tablet TAKE ONE TABLET BY MOUTH EVERY DAY FOR BLOOD PRESSURE Orally Once a day for 90 day(s)    Provider, Historical   metoprolol succinate (TOPROL-XL) 25 MG 24 hr tablet metoprolol succinate ER 25 mg tablet,extended release 24 hr   TAKE ONE TABLET BY MOUTH ONCE DAILY blood pressure AND heart rate    Provider, Historical   torsemide (DEMADEX) 10 MG Tab torsemide 10 mg tablet   TAKE ONE TABLET BY MOUTH EVERY DAY FOR FLUID    Provider, Historical   torsemide (DEMADEX) 20 MG Tab torsemide 20 mg tablet   2 tablet Orally Once a day 30 days    Provider, Historical   vit A,C and E-dietary suppl#12 5,000-1000-30 unit-mg-unit TbEF sm vit d3 125mcg 5000iu [id number]   TAKE ONE TABLET BY MOUTH EVERY DAY [facility], exp 6-21 FOR bones,skin AND healing    Provider, Historical       REVIEW OF SYSTEMS:   Except as documented, all other systems reviewed and negative     PHYSICAL EXAM:     VITAL SIGNS: 24 HRS MIN & MAX LAST   Temp  Min: 98.2 °F (36.8 °C)  Max: 98.2 °F (36.8 °C) 98.2 °F (36.8 °C)   BP  Min: 155/74  Max: 204/92 (!) 169/74   Pulse  Min: 77  Max: 90  83   Resp  Min:  18  Max: 25 (!) 22   SpO2  Min: 93 %  Max: 99 % (!) 93 %       General appearance: Female in no apparent distress.  HEENNT: Atraumatic head.   Lungs: Clear to auscultation bilaterally.    Heart: Regular rate and rhythm.    Abdomen: Soft, non-distended, non-tender. Bowel sounds are normal.   Extremities:  Dialysis access to left upper extremity; 1+ bilateral lower extremity edema  Skin: No Rash. Warm and dry.   Neuro: Awake, alert, and oriented. Motor and sensory exams grossly intact.   Psych/mental status: Appropriate mood and affect. Responds appropriately to questions.     LABS AND IMAGING:     Recent Labs   Lab 09/05/23 1753 09/06/23  0425   WBC 6.85 7.13   RBC 3.17* 3.03*   HGB 9.4* 9.2*   HCT 28.5* 27.0*   MCV 89.9 89.1   MCH 29.7 30.4   MCHC 33.0 34.1   RDW 15.3 14.9    257   MPV 11.1* 10.6*       Recent Labs   Lab 09/05/23 1753 09/06/23  0425    138   K 5.1 4.7   CO2 16* 16*   .6* 100.5*   CREATININE 8.34* 8.28*   CALCIUM 8.7 8.6   MG  --  1.70   ALBUMIN 3.5 3.4   ALKPHOS 107 101   ALT 14 11   AST 14 13   BILITOT 0.6 0.6       Microbiology Results (last 7 days)       ** No results found for the last 168 hours. **             CV US Hemodialysis Access  The left upper extremity fistula was patent with adequate diameter and   suitable cannulation depth.     Flow volume is 613ml/min.        ASSESSMENT & PLAN:   Assessment:  End-stage renal disease needing initiation of dialysis  Acute on chronic normocytic anemia  History of Essential hypertension, hyperlipidemia, CKD stage 5, and  diabetes mellitus type 2      Plan:  Nephrology consulted, appreciate recommendations  Close H&H monitoring  Insulin sliding scale with Accu-checks  Continue appropriate home medications once med rec updated   Labs in a.m.    VTE Prophylaxis:  Heparin    __________________________________________________________________________  INPATIENT LIST OF MEDICATIONS     Scheduled Meds:   heparin (porcine)  5,000 Units  Subcutaneous Q8H     Continuous Infusions:  PRN Meds:.acetaminophen, aluminum-magnesium hydroxide-simethicone, bisacodyL, ondansetron, prochlorperazine, sodium chloride 0.9%, trazodone      Deric THACKER PA-C, have reviewed and discussed the case with Dr. William Winn MD   Please see the following addendum for further assessment and plan from there attending MD.    09/06/2023    ________________________________________________________________________________    MD Addendum:  I,  assumed care of this patient today at ---am/pm  For the patient encounter, I performed the substantive portion of the visit, I reviewed the PA documentation, treatment plan, and medical decision making.  I had face to face time with this patient     A. History:    B. Physical exam:    C. Medical decision making:    Discharge Planning and Disposition: No mobility needs. Ambulating well. Good social support system.   Anticipated discharge    All diagnosis and differential diagnosis have been reviewed; assessment and plan has been documented; I have personally reviewed the labs and test results that are presently available; I have reviewed the patients medication list; I have reviewed the consulting providers response and recommendations. I have reviewed or attempted to review medical records based upon their availability.    All of the patient and family questions have been addressed and answered. Patient's is agreeable to the above stated plan. I will continue to monitor closely and make adjustments to medical management as needed.      09/06/2023

## 2023-09-06 NOTE — TELEPHONE ENCOUNTER
----- Message from Veronica Mckeon sent at 9/6/2023  8:18 AM CDT -----  Regarding: pt admitted r/s appts  The patient called requesting to cancel appts today has been admitted    No further information provided      Patient can be contacted @# [phone] (home)

## 2023-09-06 NOTE — PLAN OF CARE
Pt is , 2 children, no living will or POA. Pt inquired about qualifying for social security. Advised her to go to social security website for information about qualifying diagnoses and how to apply. Denied further needs.    09/06/23 1206   Discharge Assessment   Assessment Type Discharge Planning Assessment   Confirmed/corrected address, phone number and insurance Yes   Confirmed Demographics Correct on Facesheet   Source of Information patient   When was your last doctors appointment?   (pcp Tarun Mcclellan)   Communicated JESSICA with patient/caregiver Date not available/Unable to determine   People in Home grandchild(jignesh)   Do you expect to return to your current living situation? Yes   Do you have help at home or someone to help you manage your care at home? Yes   Who are your caregiver(s) and their phone number(s)? lives with 17 year old grandson. granddaughter El Lees identified as support [phone]   Prior to hospitilization cognitive status: Unable to Assess   Current cognitive status: Alert/Oriented   Walking or Climbing Stairs   (none)   Dressing/Bathing   (none)   Home Accessibility stairs to enter home   Number of Stairs, Main Entrance two   Home Layout Able to live on 1st floor   Equipment Currently Used at Home blood pressure machine;glucometer   Readmission within 30 days? No   Patient currently being followed by outpatient case management? No   Do you currently have service(s) that help you manage your care at home? No   Do you take prescription medications? Yes  (fills with [facility])   Do you have prescription coverage? Yes   Coverage BCBS   Do you have any problems affording any of your prescribed medications? No   Is the patient taking medications as prescribed? yes   Who is going to help you get home at discharge? granddaughter   How do you get to doctors appointments? car, drives self;family or friend will provide   Are you on dialysis? No   Do you take coumadin? No   DME Needed Upon  Discharge  other (see comments)  (TBD)   Discharge Plan discussed with: Patient   Transition of Care Barriers None   Discharge Plan A Other  (TBD)   Physical Activity   On average, how many days per week do you engage in moderate to strenuous exercise (like a brisk walk)? 0 days   On average, how many minutes do you engage in exercise at this level? 0 min   Financial Resource Strain   How hard is it for you to pay for the very basics like food, housing, medical care, and heating? Not hard   Housing Stability   In the last 12 months, was there a time when you were not able to pay the mortgage or rent on time? N   In the last 12 months, how many places have you lived? 1   In the last 12 months, was there a time when you did not have a steady place to sleep or slept in a shelter (including now)? N   Transportation Needs   In the past 12 months, has lack of transportation kept you from medical appointments or from getting medications? no   In the past 12 months, has lack of transportation kept you from meetings, work, or from getting things needed for daily living? No   Food Insecurity   Within the past 12 months, you worried that your food would run out before you got the money to buy more. Never true   Within the past 12 months, the food you bought just didn't last and you didn't have money to get more. Never true   Stress   Do you feel stress - tense, restless, nervous, or anxious, or unable to sleep at night because your mind is troubled all the time - these days? Not at all   Social Connections   In a typical week, how many times do you talk on the phone with family, friends, or neighbors? More than 3   How often do you get together with friends or relatives? More than 3   How often do you attend Synagogue or Orthodoxy services? More than 4   Do you belong to any clubs or organizations such as Synagogue groups, unions, fraternal or athletic groups, or school groups? Yes   How often do you attend meetings of the clubs or  organizations you belong to? More than 4   Are you , , , , never , or living with a partner?    Alcohol Use   Q1: How often do you have a drink containing alcohol? Never   Q2: How many drinks containing alcohol do you have on a typical day when you are drinking? None   Q3: How often do you have six or more drinks on one occasion? Never   OTHER   Name(s) of People in Home grandson

## 2023-09-06 NOTE — NURSING
09/06/23 1525   Post-Hemodialysis Assessment   Rinseback Volume (mL) 250 mL   Blood Volume Processed (Liters) 29.7 L   Dialyzer Clearance Clear   Duration of Treatment 120 minutes   Total UF (mL) 0 mL   Patient Response to Treatment pt tolerated tx well   Post-Hemodialysis Comments NAD noted, VSS, Net 0 mL

## 2023-09-06 NOTE — CONSULTS
[facility]  Nephrology  Consult Note    Patient Name: Veronica Arauz  MRN: [id number]  Admission Date: 2023  Hospital Length of Stay: 1 days  Attending Provider: William Winn MD   Primary Care Physician: Tarun Mcclellan MD  Principal Problem:<principal problem not specified>    Consults  Subjective:     HPI: This is a 63-year-old AAF female with CKD V followed by Dr Waterman. She had routine lab done for upcoming appointment with Dr Waterman and was noted to have significant azotemia. Patient was instructed to present to ER to start dialysis. At present, she is awake, alert and oriented in ER with hypertension. Recently taken oral medications. She denies nausea, vomiting, SOB, chest pain. No dizziness, confusion or weakness. Patient is independent of ADL and works part time. She had fistula placed and later revised in 2023 by Dr Rendon. Cleared for use per documentation.     Past Medical History:   Diagnosis Date    Anemia, unspecified     Chronic kidney disease, stage 5     Diabetes mellitus     Hyperlipidemia     Hypertension     Renal disorder        Past Surgical History:   Procedure Laterality Date    AV FISTULA PLACEMENT Left      SECTION      x2    COLONOSCOPY      HYSTERECTOMY      REVISION OF ARTERIOVENOUS FISTULA Left 4/3/2023    Procedure: REVISION, AV FISTULA;  Surgeon: Kala Rendon MD;  Location: [facility];  Service: Peripheral Vascular;  Laterality: Left;  LEFT RADIOCEPHALIC REVISION WITH SUPERFICIALIZATION // SUPINE //  AXILLARY BLOCK       Review of patient's allergies indicates:  No Known Allergies  Current Facility-Administered Medications   Medication Frequency    acetaminophen tablet 650 mg Q4H PRN    allopurinoL tablet 100 mg Daily    aluminum-magnesium hydroxide-simethicone 200-200-20 mg/5 mL suspension 30 mL Q4H PRN    amLODIPine tablet 5 mg Daily    atorvastatin tablet 40 mg Daily    bisacodyL suppository 10 mg Daily PRN    dextrose 10%  bolus 125 mL 125 mL PRN    dextrose 10% bolus 250 mL 250 mL PRN    glucagon (human recombinant) injection 1 mg PRN    glucose chewable tablet 16 g PRN    glucose chewable tablet 24 g PRN    heparin (porcine) injection 5,000 Units Q8H    insulin aspart U-100 injection 0-10 Units QID (AC + HS) PRN    labetaloL tablet 100 mg BID    mupirocin 2 % ointment BID    ondansetron injection 4 mg Q4H PRN    prochlorperazine injection Soln 5 mg Q6H PRN    sodium chloride 0.9% flush 10 mL PRN    trazodone split tablet 25 mg Nightly PRN     Current Outpatient Medications   Medication    allopurinoL (ZYLOPRIM) 100 MG tablet    amLODIPine (NORVASC) 5 MG tablet    calcitRIOL (ROCALTROL) 0.5 MCG Cap    furosemide (LASIX) 80 MG tablet    labetaloL (NORMODYNE) 100 MG tablet    losartan (COZAAR) 50 MG tablet    sodium bicarbonate 650 MG tablet    TRULICITY 1.5 mg/0.5 mL pen injector    atorvastatin (LIPITOR) 10 MG tablet    calcitRIOL (ROCALTROL) 0.25 MCG Cap    dulaglutide (TRULICITY) 3 mg/0.5 mL pen injector    fenofibrate (TRICOR) 145 MG tablet    glimepiride (AMARYL) 4 MG tablet    hydroCHLOROthiazide (HYDRODIURIL) 12.5 MG Tab    HYDROcodone-acetaminophen (NORCO) 7.5-325 mg per tablet    loratadine (CLARITIN) 10 mg tablet    losartan (COZAAR) 100 MG tablet    losartan (COZAAR) 50 MG tablet    metoprolol succinate (TOPROL-XL) 25 MG 24 hr tablet    torsemide (DEMADEX) 10 MG Tab    torsemide (DEMADEX) 20 MG Tab    vit A,C and E-dietary suppl#12 5,000-1000-30 unit-mg-unit TbEF     Family History    Family history is unknown by patient.       Tobacco Use    Smoking status: Never    Smokeless tobacco: Never   Substance and Sexual Activity    Alcohol use: Never    Drug use: Never    Sexual activity: Not Currently       Objective:     Vital Signs (Most Recent):  Temp: 98.2 °F (36.8 °C) (09/05/23 1740)  Pulse: 82 (09/06/23 1035)  Resp: (!) 21 (09/06/23 0701)  BP: (!) 205/84 (09/06/23 1035)  SpO2: (!) 94 % (09/06/23 0701) Vital Signs (24h  Range):  Temp:  [98.2 °F (36.8 °C)] 98.2 °F (36.8 °C)  Pulse:  [77-90] 82  Resp:  [18-25] 21  SpO2:  [93 %-99 %] 94 %  BP: (155-205)/() 205/84     Weight: 86.2 kg (190 lb) (09/05/23 1740)  Body mass index is 37.11 kg/m².  Body surface area is 1.91 meters squared.    No intake/output data recorded.    Physical Exam  Constitutional:       General: She is not in acute distress.     Appearance: She is obese.   HENT:      Head: Atraumatic.      Nose: Nose normal.      Mouth/Throat:      Mouth: Mucous membranes are moist.   Eyes:      Extraocular Movements: Extraocular movements intact.      Conjunctiva/sclera: Conjunctivae normal.   Cardiovascular:      Rate and Rhythm: Normal rate and regular rhythm.      Pulses: Normal pulses.      Heart sounds: Normal heart sounds.   Pulmonary:      Effort: Pulmonary effort is normal.      Breath sounds: Normal breath sounds.   Abdominal:      Palpations: Abdomen is soft.   Musculoskeletal:         General: Swelling present.      Cervical back: Normal range of motion and neck supple.      Comments: JENNIFER VASQUES   Neurological:      General: No focal deficit present.      Mental Status: She is alert and oriented to person, place, and time.   Psychiatric:         Mood and Affect: Mood normal.         Behavior: Behavior normal.         Significant Labs:  BMP:   Recent Labs   Lab 09/06/23  0425      K 4.7   CO2 16*   .5*   CREATININE 8.28*   CALCIUM 8.6   MG 1.70     CBC:   Recent Labs   Lab 09/06/23  0425   WBC 7.13   RBC 3.03*   HGB 9.2*   HCT 27.0*      MCV 89.1   MCH 30.4   MCHC 34.1       Significant Imaging:  N/a    Assessment/Plan:     New ESRD needing initiation of dialysis   DM II for the past 30 years   HTN  Anemia       -Patient will start dialysis today and have three consecutive days of dialysis using AV fistula. Noted clear to use documentation per Dr Rendon.   -Consult CM to complete outpatient dialysis setup in [place]   -Once BP is better  controlled we will start Epogen for anemia of CKD      Sirena Valle, SLIME  Nephrology  [facility] - Emergency Dept

## 2023-09-07 LAB
ALBUMIN SERPL-MCNC: 3.3 G/DL (ref 3.4–4.8)
ALBUMIN/GLOB SERPL: 0.9 RATIO (ref 1.1–2)
ALP SERPL-CCNC: 101 UNIT/L (ref 40–150)
ALT SERPL-CCNC: 11 UNIT/L (ref 0–55)
AST SERPL-CCNC: 15 UNIT/L (ref 5–34)
BASOPHILS # BLD AUTO: 0.04 X10(3)/MCL
BASOPHILS NFR BLD AUTO: 0.6 %
BILIRUB SERPL-MCNC: 0.7 MG/DL
BUN SERPL-MCNC: 58.7 MG/DL (ref 9.8–20.1)
CALCIUM SERPL-MCNC: 8.4 MG/DL (ref 8.4–10.2)
CHLORIDE SERPL-SCNC: 105 MMOL/L (ref 98–107)
CO2 SERPL-SCNC: 21 MMOL/L (ref 23–31)
CREAT SERPL-MCNC: 5.81 MG/DL (ref 0.55–1.02)
EOSINOPHIL # BLD AUTO: 0.18 X10(3)/MCL (ref 0–0.9)
EOSINOPHIL NFR BLD AUTO: 2.9 %
ERYTHROCYTE [DISTWIDTH] IN BLOOD BY AUTOMATED COUNT: 15.1 % (ref 11.5–17)
GFR SERPLBLD CREATININE-BSD FMLA CKD-EPI: 8 MLS/MIN/1.73/M2
GLOBULIN SER-MCNC: 3.5 GM/DL (ref 2.4–3.5)
GLUCOSE SERPL-MCNC: 111 MG/DL (ref 82–115)
HCT VFR BLD AUTO: 26.7 % (ref 37–47)
HGB BLD-MCNC: 9 G/DL (ref 12–16)
IMM GRANULOCYTES # BLD AUTO: 0.03 X10(3)/MCL (ref 0–0.04)
IMM GRANULOCYTES NFR BLD AUTO: 0.5 %
LYMPHOCYTES # BLD AUTO: 1.03 X10(3)/MCL (ref 0.6–4.6)
LYMPHOCYTES NFR BLD AUTO: 16.7 %
MCH RBC QN AUTO: 29.9 PG (ref 27–31)
MCHC RBC AUTO-ENTMCNC: 33.7 G/DL (ref 33–36)
MCV RBC AUTO: 88.7 FL (ref 80–94)
MONOCYTES # BLD AUTO: 0.66 X10(3)/MCL (ref 0.1–1.3)
MONOCYTES NFR BLD AUTO: 10.7 %
NEUTROPHILS # BLD AUTO: 4.23 X10(3)/MCL (ref 2.1–9.2)
NEUTROPHILS NFR BLD AUTO: 68.6 %
NRBC BLD AUTO-RTO: 0 %
PLATELET # BLD AUTO: 258 X10(3)/MCL (ref 130–400)
PMV BLD AUTO: 11.1 FL (ref 7.4–10.4)
POCT GLUCOSE: 111 MG/DL (ref 70–110)
POCT GLUCOSE: 125 MG/DL (ref 70–110)
POCT GLUCOSE: 165 MG/DL (ref 70–110)
POTASSIUM SERPL-SCNC: 4.4 MMOL/L (ref 3.5–5.1)
PROT SERPL-MCNC: 6.8 GM/DL (ref 5.8–7.6)
RBC # BLD AUTO: 3.01 X10(6)/MCL (ref 4.2–5.4)
SODIUM SERPL-SCNC: 137 MMOL/L (ref 136–145)
WBC # SPEC AUTO: 6.17 X10(3)/MCL (ref 4.5–11.5)

## 2023-09-07 PROCEDURE — 25000003 PHARM REV CODE 250: Mod: NTX | Performed by: STUDENT IN AN ORGANIZED HEALTH CARE EDUCATION/TRAINING PROGRAM

## 2023-09-07 PROCEDURE — 21400001 HC TELEMETRY ROOM: Mod: NTX

## 2023-09-07 PROCEDURE — 25000003 PHARM REV CODE 250: Mod: NTX | Performed by: NURSE PRACTITIONER

## 2023-09-07 PROCEDURE — 25000003 PHARM REV CODE 250: Mod: NTX | Performed by: INTERNAL MEDICINE

## 2023-09-07 PROCEDURE — 85025 COMPLETE CBC W/AUTO DIFF WBC: CPT | Mod: NTX | Performed by: PHYSICIAN ASSISTANT

## 2023-09-07 PROCEDURE — 80053 COMPREHEN METABOLIC PANEL: CPT | Mod: NTX | Performed by: PHYSICIAN ASSISTANT

## 2023-09-07 PROCEDURE — 63600175 PHARM REV CODE 636 W HCPCS: Mod: NTX | Performed by: STUDENT IN AN ORGANIZED HEALTH CARE EDUCATION/TRAINING PROGRAM

## 2023-09-07 PROCEDURE — 80100016 HC MAINTENANCE HEMODIALYSIS: Mod: NTX

## 2023-09-07 PROCEDURE — 63600175 PHARM REV CODE 636 W HCPCS: Mod: NTX | Performed by: NURSE PRACTITIONER

## 2023-09-07 RX ORDER — AMLODIPINE BESYLATE 5 MG/1
10 TABLET ORAL DAILY
Status: DISCONTINUED | OUTPATIENT
Start: 2023-09-08 | End: 2023-09-08 | Stop reason: HOSPADM

## 2023-09-07 RX ORDER — LABETALOL 200 MG/1
200 TABLET, FILM COATED ORAL 2 TIMES DAILY
Status: DISCONTINUED | OUTPATIENT
Start: 2023-09-07 | End: 2023-09-08 | Stop reason: HOSPADM

## 2023-09-07 RX ADMIN — HEPARIN SODIUM 5000 UNITS: 5000 INJECTION, SOLUTION INTRAVENOUS; SUBCUTANEOUS at 05:09

## 2023-09-07 RX ADMIN — HEPARIN SODIUM 5000 UNITS: 5000 INJECTION, SOLUTION INTRAVENOUS; SUBCUTANEOUS at 02:09

## 2023-09-07 RX ADMIN — LABETALOL HYDROCHLORIDE 200 MG: 200 TABLET, FILM COATED ORAL at 08:09

## 2023-09-07 RX ADMIN — HEPARIN SODIUM 5000 UNITS: 5000 INJECTION, SOLUTION INTRAVENOUS; SUBCUTANEOUS at 09:09

## 2023-09-07 RX ADMIN — MUPIROCIN: 20 OINTMENT TOPICAL at 02:09

## 2023-09-07 RX ADMIN — LABETALOL HYDROCHLORIDE 20 MG: 5 INJECTION INTRAVENOUS at 12:09

## 2023-09-07 RX ADMIN — MUPIROCIN: 20 OINTMENT TOPICAL at 08:09

## 2023-09-07 RX ADMIN — AMLODIPINE BESYLATE 5 MG: 5 TABLET ORAL at 08:09

## 2023-09-07 RX ADMIN — ATORVASTATIN CALCIUM 40 MG: 40 TABLET, FILM COATED ORAL at 08:09

## 2023-09-07 RX ADMIN — LABETALOL HYDROCHLORIDE 20 MG: 5 INJECTION INTRAVENOUS at 04:09

## 2023-09-07 RX ADMIN — LABETALOL HYDROCHLORIDE 100 MG: 100 TABLET, FILM COATED ORAL at 08:09

## 2023-09-07 RX ADMIN — ALLOPURINOL 100 MG: 100 TABLET ORAL at 08:09

## 2023-09-07 NOTE — PLAN OF CARE
Pt needs MWF chair time to accommodate work schedule. [facility] has [facility] 2nd shift chair available. Referral sent to [facility] Admissions for chair @ [facility]. Fistula used to initiate HD on 9.6.2023. BCBS insurance.

## 2023-09-07 NOTE — NURSING
09/07/23 1214   Post-Hemodialysis Assessment   Rinseback Volume (mL) 500 mL   Blood Volume Processed (Liters) 55 L   Dialyzer Clearance Clear   Duration of Treatment 180 minutes   Additional Fluid Intake (mL) 0 mL   Total UF (mL) 1500 mL   Net Fluid Removal 1000   Patient Response to Treatment pt tolerated well with no issues.

## 2023-09-07 NOTE — PROGRESS NOTES
[facility]  Nephrology  Progress Note    Patient Name: Veronica Arauz  MRN: [id number]  Admission Date: 9/5/2023  Hospital Length of Stay: 2 days  Attending Provider: Stanley Suggs MD   Primary Care Physician: Tarun Mcclellan MD  Principal Problem:<principal problem not specified>    Consults  Subjective:   HPI: This is a 63-year-old AAF female with CKD V followed by Dr Waterman. She had routine lab done for upcoming appointment with Dr Waterman and was noted to have significant azotemia. Patient was instructed to present to ER to start dialysis. At present, she is awake, alert and oriented in ER with hypertension. Recently taken oral medications. She denies nausea, vomiting, SOB, chest pain. No dizziness, confusion or weakness. Patient is independent of ADL and works part time. She had fistula placed and later revised in April 2023 by Dr Rendon. Cleared for use per documentation.     Interval History: On dialysis now for second treatment. No complaints. No acute events overnight.     Review of patient's allergies indicates:  No Known Allergies  Current Facility-Administered Medications   Medication Frequency    acetaminophen tablet 650 mg Q4H PRN    allopurinoL tablet 100 mg Daily    aluminum-magnesium hydroxide-simethicone 200-200-20 mg/5 mL suspension 30 mL Q4H PRN    [START ON 9/8/2023] amLODIPine tablet 10 mg Daily    atorvastatin tablet 40 mg Daily    bisacodyL suppository 10 mg Daily PRN    dextrose 10% bolus 125 mL 125 mL PRN    dextrose 10% bolus 250 mL 250 mL PRN    glucagon (human recombinant) injection 1 mg PRN    glucose chewable tablet 16 g PRN    glucose chewable tablet 24 g PRN    heparin (porcine) injection 5,000 Units Q8H    insulin aspart U-100 injection 0-10 Units QID (AC + HS) PRN    labetaloL injection 20 mg Q4H PRN    labetaloL tablet 200 mg BID    mupirocin 2 % ointment BID    ondansetron injection 4 mg Q4H PRN    prochlorperazine injection Soln 5 mg  Q6H PRN    sodium chloride 0.9% flush 10 mL PRN    trazodone split tablet 25 mg Nightly PRN       Objective:     Vital Signs (Most Recent):  Temp: 98.4 °F (36.9 °C) (09/07/23 0957)  Pulse: 80 (09/07/23 0957)  Resp: 14 (09/07/23 0957)  BP: (!) 165/77 (09/07/23 0957)  SpO2: 95 % (09/07/23 0957) Vital Signs (24h Range):  Temp:  [98 °F (36.7 °C)-98.5 °F (36.9 °C)] 98.4 °F (36.9 °C)  Pulse:  [74-81] 80  Resp:  [14-20] 14  SpO2:  [94 %-98 %] 95 %  BP: (165-195)/(72-98) 165/77     Weight: 84.6 kg (186 lb 9.6 oz) (09/07/23 0500)  Body mass index is 36.44 kg/m².  Body surface area is 1.89 meters squared.    No intake/output data recorded.    Physical Exam  Constitutional:       General: She is not in acute distress.  HENT:      Head: Atraumatic.      Nose: Nose normal.      Mouth/Throat:      Mouth: Mucous membranes are moist.   Eyes:      Extraocular Movements: Extraocular movements intact.      Conjunctiva/sclera: Conjunctivae normal.   Cardiovascular:      Rate and Rhythm: Normal rate and regular rhythm.   Pulmonary:      Effort: Pulmonary effort is normal.      Breath sounds: Normal breath sounds.   Abdominal:      General: There is no distension.      Palpations: Abdomen is soft.   Musculoskeletal:         General: Swelling present.      Comments: 1+ ankle edema, JENNIFER AVF   Skin:     General: Skin is warm and dry.   Neurological:      Mental Status: She is alert and oriented to person, place, and time.         Significant Labs:sureBMP:   Recent Labs   Lab 09/06/23  0425 09/07/23 0522    137   K 4.7 4.4   CO2 16* 21*   .5* 58.7*   CREATININE 8.28* 5.81*   CALCIUM 8.6 8.4   MG 1.70  --      CBC:   Recent Labs   Lab 09/07/23 0522   WBC 6.17   RBC 3.01*   HGB 9.0*   HCT 26.7*      MCV 88.7   MCH 29.9   MCHC 33.7     Microbiology Results (last 7 days)       ** No results found for the last 168 hours. **            Significant Imaging:  X-Ray Chest 1 View [[id number]] Resulted: 09/06/23 1230   Order  Status: Completed Updated: 09/06/23 1232   Narrative:     EXAMINATION:   XR CHEST 1 VIEW     CPT 46642     CLINICAL HISTORY:   outpatient dialysis placement requirement;     COMPARISON:   March 20, 2023     FINDINGS:   Examination reveals mediastinal silhouette to be within normal limits cardiac silhouette is enlarged lung fields are clear and free of gross infiltrates or effusions.  Some linear densities identified in the left mid lung zone either represent subsegmental atelectatic changes and or fibrotic streaks.     No other abnormalities    Impression:       Mild cardiomegaly.     Otherwise no active pulmonary disease       Electronically signed by: Edward Flood   Date: 09/06/2023   Time: 12:30       Assessment/Plan:     New ESRD needing initiation of dialysis   DM II for the past 30 years   HTN  Anemia      -Continue dialysis again today and tomorrow  -CM working on outpatient dialysis set up  Repeat lab tomorrow     SLIME Joseph  Nephrology  [facility]

## 2023-09-07 NOTE — PROGRESS NOTES
[facility]  Hospital Medicine Progress Note         Mrs Arauz is a 63 year old lady with PMH of HTN, HLD, DM, CKD V who presented to the ER after she was requested by her Nephrologist to go to the ER for worsening renal indices in order to be evaluated for hemodialysis. She stated she was doing well and had no new complaints.  She denied having any fever, chills, chest pain, shortness of breath, cough, sputum production, abdominal pain, nausea, vomiting, headache, numbness, weakness, dizziness/lightheadedness or loss of consciousness but admitted to having B/L LE edema for the last few weeks.  She was noted to be hypertensive on admission with BP in the 180s over 70s mmHg with adequate saturations on RA.  Her labs showed normocytic anemia, BUN/creatinine of 100.5/8.28, CO2 of 60, unremarkable LFTs, unremarkable UA except for proteinuria.  CXR showed clear lung fields B/L without obvious infiltrates/consolidates/effusions, mild cardiomegaly, mild bihilar prominence.  Patient admitted to Hospital Medicine.  Nephrology consulted.         Patient admitted for starting hemodialysis given end-stage renal disease.    Today's information  Patient at hemodialysis on day 2 of 3 cycle per Nephrology   Vitals reviewed with blood pressure elevated   Labs reviewed with hemoglobin levels of 9, creatinine and BUN is improving since she began dialysis, CO2 of 21, glucose 111       O/E:   General: alert lady lying comfortably in bed, in no acute distress  HENT: oral and oropharyngeal mucosa moist, pink, with no erythema or exudates, no ear pain or discharge  Neck: normal neck movement, no lymph nodes or swellings, no JVD or Carotid bruit  Respiratory: clear breathing sounds bilaterally, no crackles, rales, ronchi or wheezes  Cardiovascular: clear S1 and S2, no murmurs, rubs or gallops  Peripheral Vascular: LUE AV fistula present; no lesions, ulcers or erosions, normal peripheral pulses; 1+ pitting pedal  edema B/L  Gastrointestinal: soft, non-tender, non-distended abdomen, no guarding, rigidity or rebound tenderness, normal bowel sounds  Integumentary: normal skin color, no rashes or lesions  Neuro: AAO x 3; motor strength 5/5 in B/L UEs & LEs; sensation intact to gross and fine touch B/L; CN II-XII grossly intact       Assessment:  End-stage renal disease needing initiation of dialysis  Acute on chronic normocytic anemia  Essential hypertension, uncontrolled     History of  hyperlipidemia, CKD stage 5, and  diabetes mellitus type 2        Plan:  Continue hemodialysis per Nephrology guidance   Increase amlodipine to 10 mg daily for better blood pressure control   Increase labetalol to 200 mg b.i.d.    working on outpatient hemodialysis seats   Close H&H monitoring  Insulin sliding scale with Accu-checks   Will defer to Nephrology to start EPO for anemia.       DVT prophylaxis on subcutaneous heparin     Dispo-  May plan for DC once Nephrology clears patient after conducting hemodialysis sessions over the next 2-3 days.           VITAL SIGNS: 24 HRS MIN & MAX LAST   Temp  Min: 98 °F (36.7 °C)  Max: 98.5 °F (36.9 °C) 98.4 °F (36.9 °C)   BP  Min: 165/77  Max: 195/74 (!) 165/77   Pulse  Min: 76  Max: 81  80   Resp  Min: 14  Max: 20 14   SpO2  Min: 94 %  Max: 97 % 95 %     I have reviewed the following labs:  Recent Labs   Lab 09/05/23 1753 09/06/23 0425 09/07/23  0522   WBC 6.85 7.13 6.17   RBC 3.17* 3.03* 3.01*   HGB 9.4* 9.2* 9.0*   HCT 28.5* 27.0* 26.7*   MCV 89.9 89.1 88.7   MCH 29.7 30.4 29.9   MCHC 33.0 34.1 33.7   RDW 15.3 14.9 15.1    257 258   MPV 11.1* 10.6* 11.1*     Recent Labs   Lab 09/05/23 1753 09/06/23 0425 09/07/23  0522    138 137   K 5.1 4.7 4.4   CO2 16* 16* 21*   .6* 100.5* 58.7*   CREATININE 8.34* 8.28* 5.81*   CALCIUM 8.7 8.6 8.4   MG  --  1.70  --    ALBUMIN 3.5 3.4 3.3*   ALKPHOS 107 101 101   ALT 14 11 11   AST 14 13 15   BILITOT 0.6 0.6 0.7     Microbiology  Results (last 7 days)       ** No results found for the last 168 hours. **             See below for Radiology    Scheduled Med:   allopurinoL  100 mg Oral Daily    [START ON 9/8/2023] amLODIPine  10 mg Oral Daily    atorvastatin  40 mg Oral Daily    heparin (porcine)  5,000 Units Subcutaneous Q8H    labetaloL  200 mg Oral BID    mupirocin   Nasal BID      Continuous Infusions:     PRN Meds:  acetaminophen, aluminum-magnesium hydroxide-simethicone, bisacodyL, dextrose 10%, dextrose 10%, glucagon (human recombinant), glucose, glucose, insulin aspart U-100, labetaloL, ondansetron, prochlorperazine, sodium chloride 0.9%, trazodone     Assessment/Plan:      VTE prophylaxis:     Patient condition:  Stable/Fair/Guarded/ Serious/ Critical    Anticipated discharge and Disposition:         All diagnosis and differential diagnosis have been reviewed; assessment and plan has been documented; I have personally reviewed the labs and test results that are presently available; I have reviewed the patients medication list; I have reviewed the consulting providers response and recommendations. I have reviewed or attempted to review medical records based upon their availability    All of the patient's questions have been  addressed and answered. Patient's is agreeable to the above stated plan. I will continue to monitor closely and make adjustments to medical management as needed.  _____________________________________________________________________    Nutrition Status:    Radiology:  I have personally reviewed the following imaging and agree with the radiologist.     X-Ray Chest 1 View  Narrative: EXAMINATION:  XR CHEST 1 VIEW    CPT 66073    CLINICAL HISTORY:  outpatient dialysis placement requirement;    COMPARISON:  March 20, 2023    FINDINGS:  Examination reveals mediastinal silhouette to be within normal limits cardiac silhouette is enlarged lung fields are clear and free of gross infiltrates or effusions.  Some linear  densities identified in the left mid lung zone either represent subsegmental atelectatic changes and or fibrotic streaks.    No other abnormalities  Impression: Mild cardiomegaly.    Otherwise no active pulmonary disease    Electronically signed by: Edward Flood  Date:    09/06/2023  Time:    12:30      Hieu Deutcsh MD   09/07/2023

## 2023-09-08 VITALS
WEIGHT: 182.56 LBS | BODY MASS INDEX: 35.84 KG/M2 | OXYGEN SATURATION: 95 % | DIASTOLIC BLOOD PRESSURE: 71 MMHG | HEART RATE: 74 BPM | TEMPERATURE: 99 F | RESPIRATION RATE: 18 BRPM | HEIGHT: 60 IN | SYSTOLIC BLOOD PRESSURE: 176 MMHG

## 2023-09-08 LAB
ANION GAP SERPL CALC-SCNC: 13 MEQ/L
BUN SERPL-MCNC: 39.7 MG/DL (ref 9.8–20.1)
CALCIUM SERPL-MCNC: 8.3 MG/DL (ref 8.4–10.2)
CHLORIDE SERPL-SCNC: 104 MMOL/L (ref 98–107)
CO2 SERPL-SCNC: 20 MMOL/L (ref 23–31)
CREAT SERPL-MCNC: 4.83 MG/DL (ref 0.55–1.02)
CREAT/UREA NIT SERPL: 8
GFR SERPLBLD CREATININE-BSD FMLA CKD-EPI: 10 MLS/MIN/1.73/M2
GLUCOSE SERPL-MCNC: 102 MG/DL (ref 82–115)
POCT GLUCOSE: 73 MG/DL (ref 70–110)
POCT GLUCOSE: 97 MG/DL (ref 70–110)
POTASSIUM SERPL-SCNC: 4.4 MMOL/L (ref 3.5–5.1)
SODIUM SERPL-SCNC: 137 MMOL/L (ref 136–145)

## 2023-09-08 PROCEDURE — 80048 BASIC METABOLIC PNL TOTAL CA: CPT | Mod: NTX | Performed by: NURSE PRACTITIONER

## 2023-09-08 PROCEDURE — 63600175 PHARM REV CODE 636 W HCPCS: Mod: NTX | Performed by: NURSE PRACTITIONER

## 2023-09-08 PROCEDURE — 25000003 PHARM REV CODE 250: Mod: NTX | Performed by: NURSE PRACTITIONER

## 2023-09-08 PROCEDURE — 80100016 HC MAINTENANCE HEMODIALYSIS: Mod: NTX

## 2023-09-08 PROCEDURE — 25000003 PHARM REV CODE 250: Mod: NTX | Performed by: STUDENT IN AN ORGANIZED HEALTH CARE EDUCATION/TRAINING PROGRAM

## 2023-09-08 PROCEDURE — 25000003 PHARM REV CODE 250: Mod: NTX | Performed by: INTERNAL MEDICINE

## 2023-09-08 RX ORDER — LABETALOL 200 MG/1
200 TABLET, FILM COATED ORAL 2 TIMES DAILY
Qty: 60 TABLET | Refills: 11 | Status: SHIPPED | OUTPATIENT
Start: 2023-09-08 | End: 2024-09-07

## 2023-09-08 RX ORDER — LOSARTAN POTASSIUM 50 MG/1
100 TABLET ORAL ONCE
Status: DISCONTINUED | OUTPATIENT
Start: 2023-09-08 | End: 2023-09-08 | Stop reason: HOSPADM

## 2023-09-08 RX ORDER — ATORVASTATIN CALCIUM 40 MG/1
40 TABLET, FILM COATED ORAL DAILY
Qty: 90 TABLET | Refills: 3 | Status: SHIPPED | OUTPATIENT
Start: 2023-09-09 | End: 2024-09-08

## 2023-09-08 RX ADMIN — MUPIROCIN: 20 OINTMENT TOPICAL at 11:09

## 2023-09-08 RX ADMIN — LABETALOL HYDROCHLORIDE 200 MG: 200 TABLET, FILM COATED ORAL at 11:09

## 2023-09-08 RX ADMIN — HEPARIN SODIUM 5000 UNITS: 5000 INJECTION, SOLUTION INTRAVENOUS; SUBCUTANEOUS at 05:09

## 2023-09-08 RX ADMIN — ATORVASTATIN CALCIUM 40 MG: 40 TABLET, FILM COATED ORAL at 11:09

## 2023-09-08 RX ADMIN — AMLODIPINE BESYLATE 10 MG: 5 TABLET ORAL at 11:09

## 2023-09-08 RX ADMIN — ALLOPURINOL 100 MG: 100 TABLET ORAL at 11:09

## 2023-09-08 NOTE — PROGRESS NOTES
[facility]  Nephrology  Progress Note    Patient Name: Veronica Arauz  MRN: [id number]  Admission Date: 9/5/2023  Hospital Length of Stay: 3 days  Attending Provider: Stanley Suggs MD   Primary Care Physician: Tarun Mcclellan MD  Principal Problem:<principal problem not specified>    Consults  Subjective:   HPI: This is a 63-year-old AAF female with CKD V followed by Dr Waterman. She had routine lab done for upcoming appointment with Dr Waterman and was noted to have significant azotemia. Patient was instructed to present to ER to start dialysis. At present, she is awake, alert and oriented in ER with hypertension. Recently taken oral medications. She denies nausea, vomiting, SOB, chest pain. No dizziness, confusion or weakness. Patient is independent of ADL and works part time. She had fistula placed and later revised in April 2023 by Dr Rendon. Cleared for use per documentation.     Interval History: On dialysis now for third. No acute events overnight.     Review of patient's allergies indicates:  No Known Allergies  Current Facility-Administered Medications   Medication Frequency    acetaminophen tablet 650 mg Q4H PRN    allopurinoL tablet 100 mg Daily    aluminum-magnesium hydroxide-simethicone 200-200-20 mg/5 mL suspension 30 mL Q4H PRN    amLODIPine tablet 10 mg Daily    atorvastatin tablet 40 mg Daily    bisacodyL suppository 10 mg Daily PRN    dextrose 10% bolus 125 mL 125 mL PRN    dextrose 10% bolus 250 mL 250 mL PRN    glucagon (human recombinant) injection 1 mg PRN    glucose chewable tablet 16 g PRN    glucose chewable tablet 24 g PRN    heparin (porcine) injection 5,000 Units Q8H    insulin aspart U-100 injection 0-10 Units QID (AC + HS) PRN    labetaloL injection 20 mg Q4H PRN    labetaloL tablet 200 mg BID    mupirocin 2 % ointment BID    ondansetron injection 4 mg Q4H PRN    prochlorperazine injection Soln 5 mg Q6H PRN    sodium chloride 0.9% flush 10 mL PRN     trazodone split tablet 25 mg Nightly PRN       Objective:     Vital Signs (Most Recent):  Temp: 98.5 °F (36.9 °C) (09/08/23 0720)  Pulse: 71 (09/08/23 0720)  Resp: 16 (09/08/23 0720)  BP: (!) 166/75 (09/08/23 0720)  SpO2: 95 % (09/08/23 0720) Vital Signs (24h Range):  Temp:  [98.4 °F (36.9 °C)-98.8 °F (37.1 °C)] 98.5 °F (36.9 °C)  Pulse:  [71-81] 71  Resp:  [14-18] 16  SpO2:  [95 %-97 %] 95 %  BP: (148-166)/(53-77) 166/75     Weight: 82.8 kg (182 lb 8.7 oz) (09/08/23 0720)  Body mass index is 35.65 kg/m².  Body surface area is 1.87 meters squared.    I/O last 3 completed shifts:  In: 300 [P.O.:300]  Out: 1500 [Other:1500]    Physical Exam  Constitutional:       General: She is not in acute distress.  HENT:      Head: Atraumatic.      Nose: Nose normal.      Mouth/Throat:      Mouth: Mucous membranes are moist.   Eyes:      Extraocular Movements: Extraocular movements intact.      Conjunctiva/sclera: Conjunctivae normal.   Cardiovascular:      Rate and Rhythm: Normal rate and regular rhythm.   Pulmonary:      Effort: Pulmonary effort is normal.      Breath sounds: Normal breath sounds.   Abdominal:      General: There is no distension.      Palpations: Abdomen is soft.   Musculoskeletal:         General: Swelling present.      Comments: 1+ ankle edema, JENNIFER AVF   Skin:     General: Skin is warm and dry.   Neurological:      Mental Status: She is alert and oriented to person, place, and time.         Significant Labs:sureBMP:   Recent Labs   Lab 09/06/23  0425 09/07/23  0522 09/08/23  0554      < > 137   K 4.7   < > 4.4   CO2 16*   < > 20*   .5*   < > 39.7*   CREATININE 8.28*   < > 4.83*   CALCIUM 8.6   < > 8.3*   MG 1.70  --   --     < > = values in this interval not displayed.       CBC:   Recent Labs   Lab 09/07/23  0522   WBC 6.17   RBC 3.01*   HGB 9.0*   HCT 26.7*      MCV 88.7   MCH 29.9   MCHC 33.7       Microbiology Results (last 7 days)       ** No results found for the last 168 hours.  **            Significant Imaging:  X-Ray Chest 1 View [[id number]] Resulted: 09/06/23 1230   Order Status: Completed Updated: 09/06/23 1232   Narrative:     EXAMINATION:   XR CHEST 1 VIEW     CPT 57987     CLINICAL HISTORY:   outpatient dialysis placement requirement;     COMPARISON:   March 20, 2023     FINDINGS:   Examination reveals mediastinal silhouette to be within normal limits cardiac silhouette is enlarged lung fields are clear and free of gross infiltrates or effusions.  Some linear densities identified in the left mid lung zone either represent subsegmental atelectatic changes and or fibrotic streaks.     No other abnormalities    Impression:       Mild cardiomegaly.     Otherwise no active pulmonary disease       Electronically signed by: Edward Flood   Date: 09/06/2023   Time: 12:30       Assessment/Plan:     New ESRD needing initiation of dialysis   DM II for the past 30 years   HTN  Anemia      Once outpatient dialysis chair is confirmed she can be discharged. Expect to hear response from [facility] today about Skyla chair time. Otherwise we will continue MWF schedule     SLIME Joseph  Nephrology  [facility]

## 2023-09-08 NOTE — DISCHARGE SUMMARY
[facility] Hospital Medicine Discharge Summary    Admit Date: 9/5/2023  Discharge Date and Time: 9/8/20232:36 PM  Admitting Physician:  Team  Discharging Physician: Hieu Deutsch MD.  Primary Care Physician: Tarun Mcclellan MD  Consults: Hospital Medicine and Nephrology    Discharge Diagnoses:  End-stage renal disease needing initiation of dialysis  Acute on chronic normocytic anemia  Essential hypertension, uncontrolled      History of  hyperlipidemia, CKD stage 5, and  diabetes mellitus type 2          Hospital Course:   Mrs Arauz is a 63 year old lady with PMH of HTN, HLD, DM, CKD V who presented to the ER after she was requested by her Nephrologist to go to the ER for worsening renal indices in order to be evaluated for hemodialysis. She stated she was doing well and had no new complaints.  She denied having any fever, chills, chest pain, shortness of breath, cough, sputum production, abdominal pain, nausea, vomiting, headache, numbness, weakness, dizziness/lightheadedness or loss of consciousness but admitted to having B/L LE edema for the last few weeks.  She was noted to be hypertensive on admission with BP in the 180s over 70s mmHg with adequate saturations on RA.  Her labs showed normocytic anemia, BUN/creatinine of 100.5/8.28, CO2 of 60, unremarkable LFTs, unremarkable UA except for proteinuria.  CXR showed clear lung fields B/L without obvious infiltrates/consolidates/effusions, mild cardiomegaly, mild bihilar prominence.  Patient admitted to Hospital Medicine.  Nephrology consulted.          Patient admitted for starting hemodialysis given end-stage renal disease. To continue HD out patient        O/E:   General: alert lady lying comfortably in bed, in no acute distress  HENT: oral and oropharyngeal mucosa moist, pink, with no erythema or exudates, no ear pain or discharge  Neck: normal neck movement, no lymph nodes or swellings, no JVD or Carotid  bruit  Respiratory: clear breathing sounds bilaterally, no crackles, rales, ronchi or wheezes  Cardiovascular: clear S1 and S2, no murmurs, rubs or gallops  Peripheral Vascular: LUE AV fistula present; no lesions, ulcers or erosions, normal peripheral pulses; 1+ pitting pedal edema B/L  Gastrointestinal: soft, non-tender, non-distended abdomen, no guarding, rigidity or rebound tenderness, normal bowel sounds  Integumentary: normal skin color, no rashes or lesions  Neuro: AAO x 3; motor strength 5/5 in B/L UEs & LEs; sensation intact to gross and fine touch B/L; CN II-XII grossly intact         Pt was seen and examined on the day of discharge  Vitals:  VITAL SIGNS: 24 HRS MIN & MAX LAST   Temp  Min: 98.4 °F (36.9 °C)  Max: 98.8 °F (37.1 °C) 98.5 °F (36.9 °C)   BP  Min: 148/66  Max: 176/71 (!) 176/71   Pulse  Min: 71  Max: 81  74   Resp  Min: 16  Max: 18 18   SpO2  Min: 95 %  Max: 97 % 95 %         Procedures Performed: No admission procedures for hospital encounter.     Significant Diagnostic Studies: See Full reports for all details    Recent Labs   Lab 09/05/23 1753 09/06/23 0425 09/07/23 0522   WBC 6.85 7.13 6.17   RBC 3.17* 3.03* 3.01*   HGB 9.4* 9.2* 9.0*   HCT 28.5* 27.0* 26.7*   MCV 89.9 89.1 88.7   MCH 29.7 30.4 29.9   MCHC 33.0 34.1 33.7   RDW 15.3 14.9 15.1    257 258   MPV 11.1* 10.6* 11.1*       Recent Labs   Lab 09/05/23 1753 09/06/23 0425 09/07/23  0522 09/08/23  0554    138 137 137   K 5.1 4.7 4.4 4.4   CO2 16* 16* 21* 20*   .6* 100.5* 58.7* 39.7*   CREATININE 8.34* 8.28* 5.81* 4.83*   CALCIUM 8.7 8.6 8.4 8.3*   MG  --  1.70  --   --    ALBUMIN 3.5 3.4 3.3*  --    ALKPHOS 107 101 101  --    ALT 14 11 11  --    AST 14 13 15  --    BILITOT 0.6 0.6 0.7  --         Microbiology Results (last 7 days)       ** No results found for the last 168 hours. **             X-Ray Chest 1 View  Narrative: EXAMINATION:  XR CHEST 1 VIEW    CPT 89417    CLINICAL HISTORY:  outpatient dialysis  placement requirement;    COMPARISON:  March 20, 2023    FINDINGS:  Examination reveals mediastinal silhouette to be within normal limits cardiac silhouette is enlarged lung fields are clear and free of gross infiltrates or effusions.  Some linear densities identified in the left mid lung zone either represent subsegmental atelectatic changes and or fibrotic streaks.    No other abnormalities  Impression: Mild cardiomegaly.    Otherwise no active pulmonary disease    Electronically signed by: Edward Flood  Date:    09/06/2023  Time:    12:30         Medication List        CHANGE how you take these medications      atorvastatin 40 MG tablet  Commonly known as: LIPITOR  Take 1 tablet (40 mg total) by mouth once daily.  Start taking on: September 9, 2023  What changed:   medication strength  See the new instructions.     calcitRIOL 0.25 MCG Cap  Commonly known as: ROCALTROL  What changed: Another medication with the same name was removed. Continue taking this medication, and follow the directions you see here.     labetaloL 200 MG tablet  Commonly known as: NORMODYNE  Take 1 tablet (200 mg total) by mouth 2 (two) times daily.  What changed:   medication strength  how much to take     losartan 100 MG tablet  Commonly known as: COZAAR  What changed: Another medication with the same name was removed. Continue taking this medication, and follow the directions you see here.     TRULICITY 3 mg/0.5 mL pen injector  Generic drug: dulaglutide  What changed: Another medication with the same name was removed. Continue taking this medication, and follow the directions you see here.            CONTINUE taking these medications      allopurinoL 100 MG tablet  Commonly known as: ZYLOPRIM     amLODIPine 5 MG tablet  Commonly known as: NORVASC     fenofibrate 145 MG tablet  Commonly known as: TRICOR     furosemide 80 MG tablet  Commonly known as: LASIX  Take 1 tablet (80 mg total) by mouth 2 (two) times daily.     glimepiride 4 MG  tablet  Commonly known as: AMARYL     HYDROcodone-acetaminophen 7.5-325 mg per tablet  Commonly known as: NORCO     loratadine 10 mg tablet  Commonly known as: CLARITIN     sodium bicarbonate 650 MG tablet  Take 2 tablets (1,300 mg total) by mouth 2 (two) times daily.     vit A,C and E-dietary suppl#12 5,000-1000-30 unit-mg-unit Tbef            STOP taking these medications      hydroCHLOROthiazide 12.5 MG Tab  Commonly known as: HYDRODIURIL     metoprolol succinate 25 MG 24 hr tablet  Commonly known as: TOPROL-XL     torsemide 10 MG Tab  Commonly known as: DEMADEX     torsemide 20 MG Tab  Commonly known as: DEMADEX               Where to Get Your Medications        These medications were sent to [place] [address] [facility] [address]      Phone: [phone]   atorvastatin 40 MG tablet  labetaloL 200 MG tablet          Explained in detail to the patient about the discharge plan, medications, and follow-up visits. Pt understands and agrees with the treatment plan  Discharge Disposition: Home or Self Care   Discharged Condition: stable  Diet-   Dietary Orders (From admission, onward)       Start     Ordered    09/06/23 0811  DIET RENAL ON DIALYSIS Diabetic  (Diet/Nutrition [facility])  Diet effective now        Question:  Diet Modifier:  Answer:  Diabetic    09/06/23 0810                   Medications Per DC med rec  Activities as tolerated   Follow-up Information       Tarun Mcclellan MD Follow up.    Specialty: Family Medicine  Why: Office will call patient on follow up appt.  Contact information:  [address] [facility] [address] [phone]                           For further questions contact hospitalist office    Discharge time 33 minutes    For worsening symptoms, chest pain, shortness of breath, increased abdominal pain, high grade fever, stroke or stroke like symptoms, immediately go to the nearest Emergency Room or call 911 as  soon as possible.      Hieu Okeefe M.D on 9/8/2023. at 2:36 PM.

## 2023-09-08 NOTE — PLAN OF CARE
[facility]. She can start Monday, September 11th @ 11:15 AM.

## 2023-09-08 NOTE — PLAN OF CARE
09/08/23 1538   Discharge Assessment   Assessment Type Discharge Planning Assessment   Confirmed/corrected address, phone number and insurance Yes   Confirmed Demographics Correct on Facesheet   Source of Information patient   Does patient/caregiver understand observation status   (inpatient)   Communicated JESSICA with patient/caregiver Yes   Reason For Admission BASSEM   People in Home grandchild(jignesh)   Facility Arrived From: home   Do you expect to return to your current living situation? Yes   Do you have help at home or someone to help you manage your care at home? Yes   Who are your caregiver(s) and their phone number(s)? 16 y/o grandson   Prior to hospitilization cognitive status: Unable to Assess   Current cognitive status: Alert/Oriented   Number of Stairs, Main Entrance none   Readmission within 30 days? No   Patient currently being followed by outpatient case management? No   Do you currently have service(s) that help you manage your care at home? No   Do you take prescription medications? Yes   Do you have prescription coverage? Yes   Coverage BCBS   Do you have any problems affording any of your prescribed medications? No   Who is going to help you get home at discharge? family   How do you get to doctors appointments? car, drives self   Are you on dialysis? Yes   Dialysis Name and Scheduled days [facility] pt begins monday 9/11 at [facility]   DME Needed Upon Discharge  none   Discharge Plan discussed with: Patient   Transition of Care Barriers None   Discharge Plan A Home with family   Discharge Plan B Home with family   Financial Resource Strain   How hard is it for you to pay for the very basics like food, housing, medical care, and heating? Not hard   Housing Stability   In the last 12 months, was there a time when you were not able to pay the mortgage or rent on time? N   In the last 12 months, was there a time when you did not have a steady place to sleep or slept in a shelter (including now)?  N   Transportation Needs   In the past 12 months, has lack of transportation kept you from medical appointments or from getting medications? no   In the past 12 months, has lack of transportation kept you from meetings, work, or from getting things needed for daily living? No   OTHER   Name(s) of People in Home pt and 16 y/o grandson     Pt resides in [place] with grandson.  She is employed 2 days a week.  [facility]  Pt plans to drivie  self .  But does have lots of family support.  Pt states she will try to cont. Working 2 days a week.  Indep. with  ADL's.  Discharge home today

## 2023-09-08 NOTE — NURSING
09/08/23 1105        Hemodialysis AV Fistula Left forearm   No placement date or time found.   Present Prior to Hospital Arrival?: Yes  Location: Left forearm   Site Assessment Clean;Dry;Intact   Patency Present;Thrill;Bruit   Status Deaccessed   Dressing Status Clean;Dry;Intact   Site Condition No complications   Dressing Gauze   Post-Hemodialysis Assessment   Rinseback Volume (mL) 250 mL   Blood Volume Processed (Liters) 47.9 L   Dialyzer Clearance Lightly streaked   Duration of Treatment 180 minutes   Total UF (mL) 2500 mL   Net Fluid Removal 2000   Patient Response to Treatment Tolerated well   Post-Hemodialysis Comments Blood rinsed back per P&P.

## 2023-09-12 ENCOUNTER — TELEPHONE (OUTPATIENT)
Dept: TRANSPLANT | Facility: CLINIC | Age: 63
End: 2023-09-12
Payer: COMMERCIAL

## 2023-09-12 ENCOUNTER — PATIENT OUTREACH (OUTPATIENT)
Dept: ADMINISTRATIVE | Facility: CLINIC | Age: 63
End: 2023-09-12
Payer: COMMERCIAL

## 2023-09-12 NOTE — TELEPHONE ENCOUNTER
MA notes per Pre Dialysis adherence form     FOR THE PAST THREE MONTHS:    0-Appt Adhrence  0-No show    No concerns with labs, care giving, transportation, or mental health    Scanned in pt's media     Tata Armijo  Abdominal Transplant MA

## 2023-09-12 NOTE — PROGRESS NOTES
C3 nurse spoke with Veronica Arauz  for a TCC post hospital discharge follow up call. The patient does not have a scheduled HOSFU appointment with Tarun Mcclellan MD. Patient awaiting call from PCP office staff to schedule follow up appointment.  Unable to route message to PCP staff.

## 2023-10-05 ENCOUNTER — HOSPITAL ENCOUNTER (OUTPATIENT)
Dept: RADIOLOGY | Facility: HOSPITAL | Age: 63
Discharge: HOME | End: 2023-10-05
Attending: NURSE PRACTITIONER
Payer: COMMERCIAL

## 2023-10-05 ENCOUNTER — OFFICE VISIT (OUTPATIENT)
Dept: TRANSPLANT | Facility: CLINIC | Age: 63
End: 2023-10-05
Payer: COMMERCIAL

## 2023-10-05 ENCOUNTER — TELEPHONE (OUTPATIENT)
Dept: TRANSPLANT | Facility: CLINIC | Age: 63
End: 2023-10-05
Payer: COMMERCIAL

## 2023-10-05 VITALS
SYSTOLIC BLOOD PRESSURE: 162 MMHG | WEIGHT: 178.38 LBS | TEMPERATURE: 97 F | DIASTOLIC BLOOD PRESSURE: 75 MMHG | BODY MASS INDEX: 35.02 KG/M2 | HEART RATE: 82 BPM | HEIGHT: 60 IN | OXYGEN SATURATION: 96 % | RESPIRATION RATE: 16 BRPM

## 2023-10-05 DIAGNOSIS — Z76.82 ORGAN TRANSPLANT CANDIDATE: ICD-10-CM

## 2023-10-05 DIAGNOSIS — N18.6 ESRD (END STAGE RENAL DISEASE): Primary | ICD-10-CM

## 2023-10-05 DIAGNOSIS — Z01.818 ENCOUNTER FOR PRE-TRANSPLANT EVALUATION FOR KIDNEY TRANSPLANT: ICD-10-CM

## 2023-10-05 PROCEDURE — 99215 OFFICE O/P EST HI 40 MIN: CPT | Mod: S$GLB,TXP,, | Performed by: INTERNAL MEDICINE

## 2023-10-05 PROCEDURE — 3008F PR BODY MASS INDEX (BMI) DOCUMENTED: ICD-10-PCS | Mod: CPTII,S$GLB,TXP, | Performed by: INTERNAL MEDICINE

## 2023-10-05 PROCEDURE — 3066F NEPHROPATHY DOC TX: CPT | Mod: CPTII,S$GLB,TXP, | Performed by: INTERNAL MEDICINE

## 2023-10-05 PROCEDURE — 1159F MED LIST DOCD IN RCRD: CPT | Mod: CPTII,S$GLB,TXP, | Performed by: INTERNAL MEDICINE

## 2023-10-05 PROCEDURE — 1160F RVW MEDS BY RX/DR IN RCRD: CPT | Mod: CPTII,S$GLB,TXP, | Performed by: INTERNAL MEDICINE

## 2023-10-05 PROCEDURE — 99215 PR OFFICE/OUTPT VISIT, EST, LEVL V, 40-54 MIN: ICD-10-PCS | Mod: S$GLB,TXP,, | Performed by: INTERNAL MEDICINE

## 2023-10-05 PROCEDURE — 3044F HG A1C LEVEL LT 7.0%: CPT | Mod: CPTII,S$GLB,TXP, | Performed by: INTERNAL MEDICINE

## 2023-10-05 PROCEDURE — 99204 PR OFFICE/OUTPT VISIT, NEW, LEVL IV, 45-59 MIN: ICD-10-PCS | Mod: S$GLB,TXP,, | Performed by: TRANSPLANT SURGERY

## 2023-10-05 PROCEDURE — 99999 PR PBB SHADOW E&M-EST. PATIENT-LVL V: CPT | Mod: PBBFAC,TXP,, | Performed by: INTERNAL MEDICINE

## 2023-10-05 PROCEDURE — 72170 X-RAY EXAM OF PELVIS: CPT | Mod: 26,TXP,, | Performed by: RADIOLOGY

## 2023-10-05 PROCEDURE — 72170 X-RAY EXAM OF PELVIS: CPT | Mod: TC,TXP

## 2023-10-05 PROCEDURE — 3077F PR MOST RECENT SYSTOLIC BLOOD PRESSURE >= 140 MM HG: ICD-10-PCS | Mod: CPTII,S$GLB,TXP, | Performed by: INTERNAL MEDICINE

## 2023-10-05 PROCEDURE — 3078F DIAST BP <80 MM HG: CPT | Mod: CPTII,S$GLB,TXP, | Performed by: INTERNAL MEDICINE

## 2023-10-05 PROCEDURE — 1111F PR DISCHARGE MEDS RECONCILED W/ CURRENT OUTPATIENT MED LIST: ICD-10-PCS | Mod: CPTII,S$GLB,TXP, | Performed by: INTERNAL MEDICINE

## 2023-10-05 PROCEDURE — 99999 PR PBB SHADOW E&M-EST. PATIENT-LVL V: ICD-10-PCS | Mod: PBBFAC,TXP,, | Performed by: INTERNAL MEDICINE

## 2023-10-05 PROCEDURE — 72170 XR PELVIS ROUTINE AP: ICD-10-PCS | Mod: 26,TXP,, | Performed by: RADIOLOGY

## 2023-10-05 PROCEDURE — 76770 US EXAM ABDO BACK WALL COMP: CPT | Mod: TC,TXP

## 2023-10-05 PROCEDURE — 4010F PR ACE/ARB THEARPY RXD/TAKEN: ICD-10-PCS | Mod: CPTII,S$GLB,TXP, | Performed by: INTERNAL MEDICINE

## 2023-10-05 PROCEDURE — 3077F SYST BP >= 140 MM HG: CPT | Mod: CPTII,S$GLB,TXP, | Performed by: INTERNAL MEDICINE

## 2023-10-05 PROCEDURE — 1111F DSCHRG MED/CURRENT MED MERGE: CPT | Mod: CPTII,S$GLB,TXP, | Performed by: INTERNAL MEDICINE

## 2023-10-05 PROCEDURE — 3008F BODY MASS INDEX DOCD: CPT | Mod: CPTII,S$GLB,TXP, | Performed by: INTERNAL MEDICINE

## 2023-10-05 PROCEDURE — 99204 OFFICE O/P NEW MOD 45 MIN: CPT | Mod: S$GLB,TXP,, | Performed by: TRANSPLANT SURGERY

## 2023-10-05 PROCEDURE — 3044F PR MOST RECENT HEMOGLOBIN A1C LEVEL <7.0%: ICD-10-PCS | Mod: CPTII,S$GLB,TXP, | Performed by: INTERNAL MEDICINE

## 2023-10-05 PROCEDURE — 1160F PR REVIEW ALL MEDS BY PRESCRIBER/CLIN PHARMACIST DOCUMENTED: ICD-10-PCS | Mod: CPTII,S$GLB,TXP, | Performed by: INTERNAL MEDICINE

## 2023-10-05 PROCEDURE — 1159F PR MEDICATION LIST DOCUMENTED IN MEDICAL RECORD: ICD-10-PCS | Mod: CPTII,S$GLB,TXP, | Performed by: INTERNAL MEDICINE

## 2023-10-05 PROCEDURE — 4010F ACE/ARB THERAPY RXD/TAKEN: CPT | Mod: CPTII,S$GLB,TXP, | Performed by: INTERNAL MEDICINE

## 2023-10-05 PROCEDURE — 99204 PR OFFICE/OUTPT VISIT, NEW, LEVL IV, 45-59 MIN: ICD-10-PCS | Mod: S$GLB,TXP,, | Performed by: PHYSICIAN ASSISTANT

## 2023-10-05 PROCEDURE — 76770 US EXAM ABDO BACK WALL COMP: CPT | Mod: 26,TXP,, | Performed by: RADIOLOGY

## 2023-10-05 PROCEDURE — 3066F PR DOCUMENTATION OF TREATMENT FOR NEPHROPATHY: ICD-10-PCS | Mod: CPTII,S$GLB,TXP, | Performed by: INTERNAL MEDICINE

## 2023-10-05 PROCEDURE — 76770 US RETROPERITONEAL COMPLETE: ICD-10-PCS | Mod: 26,TXP,, | Performed by: RADIOLOGY

## 2023-10-05 PROCEDURE — 3078F PR MOST RECENT DIASTOLIC BLOOD PRESSURE < 80 MM HG: ICD-10-PCS | Mod: CPTII,S$GLB,TXP, | Performed by: INTERNAL MEDICINE

## 2023-10-05 PROCEDURE — 99204 OFFICE O/P NEW MOD 45 MIN: CPT | Mod: S$GLB,TXP,, | Performed by: PHYSICIAN ASSISTANT

## 2023-10-05 RX ORDER — DULAGLUTIDE 4.5 MG/.5ML
1.5 INJECTION, SOLUTION SUBCUTANEOUS WEEKLY
COMMUNITY
Start: 2023-09-29

## 2023-10-05 RX ORDER — CHOLECALCIFEROL (VITAMIN D3) 125 MCG
1 CAPSULE ORAL DAILY
COMMUNITY

## 2023-10-05 RX ORDER — SEVELAMER CARBONATE FOR ORAL SUSPENSION 800 MG/1
1 POWDER, FOR SUSPENSION ORAL
COMMUNITY
Start: 2023-09-21

## 2023-10-05 NOTE — TELEPHONE ENCOUNTER
Reviewed pt transplant labs.  Notified dialysis unit dietitian of the following abnormal labs via fax and requested their most recent nutrition note on this pt.  Once this note is received it will be scanned into pt's chart.     Cholesterol 93  K 3.4

## 2023-10-05 NOTE — PROGRESS NOTES
Transplant Nephrology  Kidney Transplant Recipient Evaluation    Referring Physician: Sanya Waterman  Current Nephrologist: Sanya Waterman    Subjective:   CC:  Initial evaluation of kidney transplant candidacy.    HPI:  Ms. Arauz is a 63 y.o. year old Black or  female who has presented to be evaluated as a potential kidney transplant recipient.  ESRD secondary to presumed DM (no prior kidney biopsy) who started HD on  via L AVF. Noted to have hypotension on HD but remains on BP meds. Still makes normal amount of urine    DM diagnosed at the age of 31 managed with PO meds and recently with Trulicity.  Denies any retinopathy, neuropathy.     Hx of heart murmur for which she follows with cardiologist. Denies any CAD or stroke. Denies any SOB, CP or claudication. 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 Independent in her ADLS. No formal exercise    PMH: DM, HTN, HLD    PSH: hysterectomy, C section x 2, AVF    Social: denies any smoking, ETOH or drugs.  with one daughter and son. Grandson lives with her. Daughter would take care of her after transplant    FH: grandmother with colon cancer; MI in mom (65) and brother (38 yr). Father with prostate cancer    Potential donor: Daughter who is 44 and may have son willing to donate.    Previous Transplant: no    Past Medical and Surgical History: Ms. Arauz  has a past medical history of Anemia, unspecified, Chronic kidney disease, stage 5, Diabetes mellitus, Diabetes mellitus, type 2, Hyperlipidemia, Hypertension, Mechanical complication of arteriovenous surgical fistula, and Renal disorder.  She has a past surgical history that includes Hysterectomy; AV fistula placement (Left);  section; Colonoscopy; and Revision of arteriovenous fistula (Left, 2023).    Past Social and Family History: Ms. Arauz reports that she has never smoked. She has never used smokeless tobacco. She reports that she does not drink alcohol and  "does not use drugs. Her Family history is unknown by patient.    Review of Systems   Constitutional:  Negative for activity change, appetite change, chills and fever.   HENT:  Negative for congestion, sneezing and sore throat.    Eyes:  Negative for pain and itching.   Respiratory:  Negative for apnea, cough, shortness of breath and wheezing.    Cardiovascular:  Negative for chest pain.   Gastrointestinal:  Negative for abdominal pain, constipation, diarrhea, nausea and vomiting.   Genitourinary:  Negative for difficulty urinating, dysuria and frequency.   Musculoskeletal:  Negative for back pain, joint swelling and neck pain.   Skin:  Negative for color change.   Neurological:  Negative for dizziness, light-headedness and headaches.   Psychiatric/Behavioral:  Negative for behavioral problems and confusion. The patient is not nervous/anxious.        Objective:   Blood pressure (!) 162/75, pulse 82, temperature 97.3 °F (36.3 °C), temperature source Temporal, resp. rate 16, height 5' 0.04" (1.525 m), weight 80.9 kg (178 lb 5.6 oz), SpO2 96 %.body mass index is 34.79 kg/m².    Physical Exam  Vitals reviewed.   Constitutional:       General: She is not in acute distress.     Appearance: Normal appearance. She is not ill-appearing or toxic-appearing.      Comments: Appears stated age   HENT:      Head: Normocephalic and atraumatic.      Right Ear: External ear normal.      Left Ear: External ear normal.      Nose: Nose normal.   Eyes:      General: No scleral icterus.     Conjunctiva/sclera: Conjunctivae normal.   Cardiovascular:      Rate and Rhythm: Normal rate and regular rhythm.      Pulses: Normal pulses.      Heart sounds: Normal heart sounds. No murmur heard.     No friction rub.   Pulmonary:      Effort: Pulmonary effort is normal. No respiratory distress.      Breath sounds: Normal breath sounds. No wheezing or rhonchi.   Abdominal:      General: Bowel sounds are normal. There is no distension.      Palpations: " "Abdomen is soft.      Tenderness: There is no abdominal tenderness. There is no guarding.   Musculoskeletal:         General: No swelling or deformity. Normal range of motion.      Cervical back: Normal range of motion and neck supple. No tenderness.      Right lower leg: No edema.      Left lower leg: No edema.   Skin:     General: Skin is warm and dry.      Coloration: Skin is not jaundiced.      Findings: No erythema or rash.   Neurological:      General: No focal deficit present.      Mental Status: She is alert and oriented to person, place, and time.      Motor: No weakness.   Psychiatric:         Mood and Affect: Mood normal.         Behavior: Behavior normal.         Labs:  Lab Results   Component Value Date    WBC 8.62 10/05/2023    HGB 10.4 (L) 10/05/2023    HCT 33.7 (L) 10/05/2023     10/05/2023    K 3.4 (L) 10/05/2023    CL 96 10/05/2023    CO2 28 10/05/2023    BUN 25 (H) 10/05/2023    CREATININE 5.3 (H) 10/05/2023    EGFRNORACEVR 8.5 (A) 10/05/2023    GLUCOSE 102 09/08/2023    CALCIUM 9.5 10/05/2023    PHOS 4.5 10/05/2023    MG 1.70 09/06/2023    ALBUMIN 3.7 10/05/2023    AST 16 10/05/2023    ALT 17 10/05/2023    .6 (H) 10/05/2023       Lab Results   Component Value Date    BILIRUBINUA Negative 09/05/2023    PROTEINUA 3+ (A) 09/05/2023    RBCUA 0-5 09/05/2023    WBCUA 3-5 09/05/2023       No results found for: "HLAABCTYPE"    Labs were reviewed with the patient.    Assessment:   63 yr old AAF with ESRD secondary to presumed DM who started HD on Sept 6, 2023 who presents for kidney transplant evaluation    1) ESRD    2) Heart murmur?    3) type II DM    3) HTN    4) HLD    Transplant Candidacy:   Based on available information, Ms. Arauz is a suitable kidney transplant candidate. Needs routine cardiac testing, colonoscopy, mammogram   Meets center eligibility for accepting HCV+ donor offer - Yes.  Patient educated on HCV+ donors. Veronica is willing to accept HCV+ donor offer -  would like to " think about it. Discussed at length but patient remains hesitatnt     Patient is a candidate for KDPI > 85 kidney donor offer - Yes.  Final determination of transplant candidacy will be made once workup is complete and reviewed by the selection committee.    Patient advised that it is recommended that all transplant candidates, and their close contacts and household members receive Covid vaccination.    UNOS Patient Status  Functional Status: 90% - Able to carry on normal activity: minor symptoms of disease      Any Previous Malignancy: No  Exhausted Vascular Access: no  Exhausted Peritoneal Access: no  Shaye Torres DO   Transplant Nephrology

## 2023-10-05 NOTE — Clinical Note
October 6, 2023        Sanya Waterman  [address]  [place] [address]  Phone: [phone]  Fax: [phone]             [facility]- Transplant 1st Fl  [address]  [facility] [address]  Phone: [phone]   Patient: Veronica Arauz   MR Number: [id number]   YOB: 1960   Date of Visit: 10/5/2023       Dear Dr. Sanya Waterman    Thank you for referring Veronica Arauz to me for evaluation. Attached you will find relevant portions of my assessment and plan of care.    If you have questions, please do not hesitate to call me. I look forward to following Veronica Arauz along with you.    Sincerely,    Shaye Torres, DO    Enclosure    If you would like to receive this communication electronically, please contact [email or web address] or [phone] to request [employer] Link access.    [employer] Link is a tool which provides read-only access to select patient information with whom you have a relationship. Its easy to use and provides real time access to review your patients record including encounter summaries, notes, results, and demographic information.    If you feel you have received this communication in error or would no longer like to receive these types of communications, please e-mail [email or web address]

## 2023-10-05 NOTE — PROGRESS NOTES
Transplant Surgery  Kidney Transplant Recipient Evaluation    Referring Physician: Sanya Waterman  Current Nephrologist: Sanya Waterman    Subjective:     Reason for Visit: evaluate transplant candidacy    History of Present Illness: Veronica Arauz is a 63 y.o. year old female undergoing transplant evaluation.    Dialysis History: Veronica is on hemodialysis.      Transplant History: N/A    Etiology of Renal Disease: Diabetes Mellitus - Type II (based on medical records from referral).    External provider notes reviewed: Yes    Review of Systems   Constitutional:  Negative for activity change, appetite change, chills, fatigue and fever.   HENT:  Negative for sore throat and trouble swallowing.    Eyes:  Negative for visual disturbance.   Respiratory:  Negative for cough, chest tightness and shortness of breath.    Cardiovascular:  Negative for chest pain, palpitations and leg swelling.   Gastrointestinal:  Negative for abdominal distention, abdominal pain, blood in stool, constipation, diarrhea, nausea and vomiting.   Endocrine: Negative for polyuria.   Genitourinary:  Negative for decreased urine volume, difficulty urinating, dysuria, flank pain, frequency and hematuria.   Musculoskeletal:  Negative for gait problem, myalgias and neck stiffness.   Skin:  Negative for rash and wound.   Neurological:  Negative for dizziness, tremors, seizures, weakness, light-headedness and headaches.   Hematological:  Negative for adenopathy.   Psychiatric/Behavioral:  Negative for agitation, confusion and sleep disturbance.      Objective:     Physical Exam:  Constitutional:   Vitals reviewed: yes   Well-nourished and well-groomed: yes  Eyes:   Sclerae icteric: no   Extraocular movements intact: yes  GI:    Bowel sounds normal: yes   Tenderness: no    If yes, quadrant/location: not applicable   Palpable masses: no    If yes, quadrant/location: not applicable   Hepatosplenomegaly: no   Ascites: no   Hernia: no    If yes,  type/location: not applicable   Surgical scars: yes    If yes, type/location: midline ()  Resp:   Effort normal: yes   Breath sounds normal: yes    CV:   Regular rate and rhythm: yes   Heart sounds normal: yes   Femoral pulses normal: yes   Extremities edematous: no  Skin:   Rashes or lesions present: no    If yes, describe:not applicable   Jaundice:: no    Musculoskeletal:   Gait normal: yes   Strength normal: yes  Psych:   Oriented to person, place, and time: yes   Affect and mood normal: yes    Additional comments: not applicable    Diagnostics:  The following labs have been reviewed: CBC  CMP  PT  INR  PTT  Abo  Pth  A1c  Lipid panel  The following radiology images have been independently reviewed and interpreted: pending    Counseling: We provided Veronica Arauz with a group education session today.  We discussed kidney transplantation at length with her, including risks, potential complications, and alternatives in the management of her renal failure.  The discussion included complications related to anesthesia, bleeding, infection, primary nonfunction, and ATN.  I discussed the typical postoperative course, length of hospitalization, the need for long-term immunosuppression, and the need for long-term routine follow-up.  I discussed living-donor and -donor transplantation and the relative advantages and disadvantages of each.  I also discussed average waiting times for both living donation and  donation.  I discussed national and center-specific survival rates.  I also mentioned the potential benefit of multicenter listing to candidates listed with centers within more than one organ procurement organization.  All questions were answered.    Patient advised that it is recommended that all transplant candidates, and their close contacts and household members receive Covid vaccination.    Final determination of transplant candidacy will be made once evaluation is complete and reviewed by  the Kidney & Kidney/Pancreas Selection Committee.    Coronavirus disease (COVID-19) caused by severe acute respiratory virus coronavirus 2 (SARS-C0V 2) is associated with increased mortality in solid organ transplant recipients (SOT) compared to non-transplant patients. Vaccine responses to vaccination are depressed against SARS-CoV2 compared to normal individuals but improve with third vaccination doses. Vaccination prior to SOT provides both the best opportunity for transplant candidates to develop protective immunity and to reduce the risk of serious COVID19 infections post transplantation. Organ transplant candidates at [facility] will be required to receive SARS-CoV-2 vaccination prior to being listed with a an active status, whenever possible. Exceptions will be made for disability related reasons or for sincerely held Confucianist beliefs.          Transplant Surgery - Candidacy   Assessment/Plan:   Veronica Arauz has end stage renal disease (ESRD) on dialysis. I see no surgical contraindication to placing a kidney transplant. Based on available information, Veronica Arauz is a suitable kidney transplant candidate.     Additional testing to be completed according to the Written Order Guidelines for Adult Pre-kidney and Pancreas Transplant Evaluation (KI-02).  Interpretation of tests and discussion of patient management involves all members of the multidisciplinary transplant team.    Kylie Serrano MD

## 2023-10-05 NOTE — PROGRESS NOTES
PRE-TRANSPLANT INFECTIOUS DISEASE CONSULT    Reason for Visit:  Pre-transplant evaluation  Referring Provider: Dr. Sanya Waterman     History of Present Illness:    63 y.o. female with a history of kidney disease on HD presents for pre-kidney transplant evaluation. Patient denies dialysis related infections.     Infectious History:  Recent hospital admissions: Yes - worsening renal function  Recent infections: No  Recent or current antibiotic use: No  History of recurrent infections: No  History of diabetic foot wound or bone/joint infection: No  Recent dental infections, issues or procedures: No  History of chicken pox: unsure  History of shingles: No  History of STI: No  History of COVID infection: No    History of Immunosuppression:  Prior chemotherapy / immunosuppression: No  Prior transplant: No  History of splenectomy: No    Tuberculosis:  Prior screening for latent TB: Yes  Prior diagnosis of latent TB: No  Risk factors for TB *known exposure, incarceration, homelessness*: No    Geographical exposures:  Currently lives in [place] with grandson  Lived in the following states: [place]  Lived or travelled to the [facility] [place]: No  International travel: No  Travel-associated illness: No    Social/Environmental:  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  No  Tobacco: No  Alcohol: No  Recreational drug use:  No  Sexual partners: none      Past Histories:   Past Medical History:   Diagnosis Date    Anemia, unspecified     Chronic kidney disease, stage 5     Diabetes mellitus     Diabetes mellitus, type 2     Hyperlipidemia     Hypertension     Mechanical complication of arteriovenous surgical fistula     Renal disorder      Past Surgical History:   Procedure Laterality Date    AV FISTULA PLACEMENT Left      SECTION      x2    COLONOSCOPY      HYSTERECTOMY      REVISION  OF ARTERIOVENOUS FISTULA Left 04/03/2023    Procedure: REVISION, AV FISTULA;  Surgeon: Kala Rendon MD;  Location: [facility] OR;  Service: Peripheral Vascular;  Laterality: Left;  LEFT RADIOCEPHALIC REVISION WITH SUPERFICIALIZATION // SUPINE //  AXILLARY BLOCK     Family History   Family history unknown: Yes     Social History     Tobacco Use    Smoking status: Never    Smokeless tobacco: Never   Substance Use Topics    Alcohol use: Never    Drug use: Never     Review of patient's allergies indicates:  No Known Allergies      Current antibiotics:  Antibiotics (From admission, onward)      None              Review of Systems  Review of Systems   Constitutional: Negative for chills, fever, malaise/fatigue and night sweats.   HENT:  Negative for congestion and sore throat.    Eyes:  Negative for blurred vision and visual disturbance.   Cardiovascular:  Negative for chest pain and leg swelling.   Respiratory:  Negative for cough, shortness of breath and sputum production.    Skin:  Negative for color change, dry skin and itching.   Musculoskeletal:  Negative for back pain, joint pain and joint swelling.   Gastrointestinal:  Negative for abdominal pain, diarrhea, heartburn, nausea and vomiting.   Genitourinary:  Negative for dysuria, flank pain and hematuria.   Neurological:  Negative for dizziness, numbness and weakness.   Psychiatric/Behavioral:  Negative for altered mental status and depression. The patient is not nervous/anxious.           Objective  Physical Exam  Constitutional:       General: She is not in acute distress.     Appearance: Normal appearance. She is well-developed. She is not ill-appearing or diaphoretic.   HENT:      Head: Normocephalic and atraumatic.      Right Ear: External ear normal.      Left Ear: External ear normal.      Nose: Nose normal.   Eyes:      General: No scleral icterus.        Right eye: No discharge.         Left eye: No discharge.      Extraocular Movements: Extraocular  "movements intact.      Conjunctiva/sclera: Conjunctivae normal.   Pulmonary:      Effort: Pulmonary effort is normal. No respiratory distress.      Breath sounds: No stridor.   Musculoskeletal:         General: Normal range of motion.   Skin:     Findings: No erythema or rash.   Neurological:      General: No focal deficit present.      Mental Status: She is alert and oriented to person, place, and time. Mental status is at baseline.      Cranial Nerves: No cranial nerve deficit.   Psychiatric:         Mood and Affect: Mood normal.         Behavior: Behavior normal.         Thought Content: Thought content normal.         Judgment: Judgment normal.           Labs:    CBC:   Lab Results   Component Value Date    WBC 8.62 10/05/2023    HGB 10.4 (L) 10/05/2023    HCT 33.7 (L) 10/05/2023    MCV 98 10/05/2023     10/05/2023    GRAN 5.9 10/05/2023    GRAN 68.2 10/05/2023    LYMPH 1.6 10/05/2023    LYMPH 18.6 10/05/2023    MONO 0.9 10/05/2023    MONO 10.8 10/05/2023    EOSINOPHIL 1.5 10/05/2023       Syphilis screening: No results found for: "RPR", "PRPQ", "FTAABS"     TB screening: No results found for: "TBGOLDPLUS", "TSPOTSCREN"    HIV screening:   Lab Results   Component Value Date    [id number] Non-reactive 10/05/2023       Strongyloides IgG: No results found for: "STRONGANTIGG"    Hepatitis Serologies:   Lab Results   Component Value Date    HEPAIGG Non-reactive 10/05/2023    HEPBCAB Non-reactive 10/05/2023    HEPBSAB >1000.00 10/05/2023    HEPBSAB Reactive 10/05/2023    HEPCAB Non-reactive 10/05/2023        Varicella IgG: No results found for: "VARICELLAINT"      Immunization History   Administered Date(s) Administered    COVID-19, MRNA, LN-S, PF (MODERNA FULL 0.5 ML DOSE) 08/25/2021, 09/22/2021    Hepatitis B (recombinant) Adjuvanted, 2 dose 09/25/2023    Influenza - Quadrivalent - PF *Preferred* (6 months and older) 12/14/2022, 09/20/2023    Influenza A (H1N1) 2009 Monovalent - IM 10/27/2009    Pneumococcal " Conjugate - 20 Valent 09/22/2023   Tdap: > 10 years ago  HAV: never  Shingles: never    Immunization History:  Received all childhood vaccines: Yes  All household members receive annual flu vaccine: Yes  All household members are up to date on COVID vaccine: Yes      Assessment and Plan    1. Risks of Infection: Available serologies were reviewed. No unusual risks of infection or significant barriers to transplantation were identified from the infectious disease standpoint given the information available at this time.    - Acute infectious issues: None   - Pending serologies: Quantiferon gold / T-spot, RPR, Strongyloides IgG, and VZV IgG   - Please call if any pending serologic testing is positive.    2. Immunizations:  Based on the patient's immunization history and serologies, the following immunizations are recommended:  - Hepatitis A    Patient does not have immunity to hepatitis A    Vaccination ordered today: Yes   - Hepatitis B    Patient does have immunity to hepatitis B    Vaccination ordered today: No. Reason for not ordering: Immunity and receiving at    - COVID    Current [facility] [place] vaccination recommendations were discussed with the patient   - Annual high dose influenza     Vaccination ordered today: No. Reason for not ordering: vaccination up to date   - Prevnar 20    Vaccination ordered today: No. Reason for not ordering: vaccination up to date   - Tdap    Vaccination ordered today: Yes   - Shingrix    Vaccination ordered today: Yes    Recommended Pre-Transplant Immunization Schedule   Vaccine  0m 1m 2m 6m   Pneumococcal conjugate vaccine (Prevnar 20) X      Tetanus-diphtheria-pertussis (Tdap)* X      Hepatitis A Vaccine (Havrix)** X   X   Hepatitis B Vaccine (Heplisav)** X X     Influenza (annual) X      Zoster Recombinant Vaccine (Shingrix) X  X           *Administer booster every 10 years.       **Administer if no immunity demonstrated on serologies               Patient will receive vaccines at local  pharmacy. A written prescription was provided for all vaccine doses.     3. Counseling:   I discussed with the patient the risk for increased susceptibility to infections following transplantation including increased risk for infection right after transplant and if rejection should occur.  The patient has been counseled on the importance of vaccinations to decrease risk of infection and severe illness. Specific guidance has been provided to the patient regarding the patient's occupation, hobbies and activities to avoid future infectious complications.     4. Transplant Candidacy: Based on available information, there are no identified significant barriers to transplantation from an infectious disease standpoint.  Final determination of transplant candidacy will be made once evaluation is complete and reviewed by the Selection Committee.      Follow up with infectious disease as needed.       The total time for evaluation and management services performed on 10/05/2023 was greater than 45 minutes.

## 2023-10-05 NOTE — PROGRESS NOTES
INITIAL PATIENT EDUCATION NOTE    Ms. Veronica Arauz was seen in pre-kidney transplant clinic for evaluation for kidney, kidney/pancreas or pancreas only transplant.  The patient attended a an individual video education session that discussed/reviewed the following aspects of transplantation: evaluation including diagnostic and laboratory testing,( Chemistries, Hematology, Serologies including HIV and Hepatitis and HLA) required for transplantation and selection committee process, [facility] waitlist management/multiple listings, types of organs offered (KDPI < 85%, KDPI > 85%, PHS risk, DCD, HCV+, HIV+ for HIV+ recipients and enbloc/dual), financial aspects, surgical procedures, dietary instruction pre- and post-transplant, health maintenance pre- and post-transplant, post-transplant hospitalization and outpatient follow-up, potential to participate in a research protocol, and medication management and side effects.  A question and answer session was provided after the presentation.    The patient was seen by all members of the multi-disciplinary team to include: Nephrologist/ROBER, Surgeon, , Transplant Coordinator, , Pharmacist and Dietician (if applicable).    The patient reviewed and signed all consents for evaluation which were witnessed and sent to scanning into the [facility] chart.    The patient was given an education book and plan for further evaluation based on her individual assessment.      Reviewed program requirement for complete COVID vaccination with documentation prior to listing.  COVID education information reviewed with patient. Patient encouraged to be up to date on all vaccinations.       The patient was informed that the transplant team would manage immediate post op pain. If the patient requires long term pain management, they will need to have that pain management addressed by their PCP or previous provider who wrote for long term pain medicines.    The patient was  encouraged to call with any questions or concerns.

## 2023-10-06 NOTE — PROGRESS NOTES
Transplant Recipient Adult Psychosocial Assessment    Veronica Arauz  [address] [facility] [address]  Telephone Information:   Mobile [phone]   Home  [phone] (home)  Work  There is no work phone number on file.  E-mail  [email or web address]    Sex: female  YOB: 1960  Age: 63 y.o.    Encounter Date: 10/5/2023  [place] Citizen: yes  Primary Language: English   Needed: no    Emergency Contact:  Name: Niru Glover  Relationship: daughter  Address: [address] [place] [address]  Phone Numbers: [phone] (mobile)    Family/Social Support:   Number of dependents/: Pt reports having no minor dependents.  Marital history: Pt reports 2 previous marriages. Pt is currently a .  Other family dynamics: Pt presents with highly supportive dtr Niru. Pt reports having 3 siblings who are all highly supportive of each other. Pt also reports having 2 adult children: Niru and Terrance. Pt reports entire family as highly supportive.     Household Composition:    Pt resides alone     Do you and your caregivers have access to reliable transportation? yes  PRIMARY CAREGIVER: Niur Glover will be primary caregiver, phone number [phone].      provided in-depth information to patient and caregiver regarding pre- and post-transplant caregiver role.   strongly encourages patient and caregiver to have concrete plan regarding post-transplant care giving, including back-up caregiver(s) to ensure care giving needs are met as needed.    Patient and Caregiver states understanding all aspects of caregiver role/commitment and is able/willing/committed to being caregiver to the fullest extent necessary.    Patient and Caregiver verbalizes understanding of the education provided today and caregiver responsibilities.         remains available. Patient and Caregiver agree to contact  in a timely manner if concerns arise.      Able to take  time off work without financial concerns: yes.     Additional Significant Others who will Assist with Transplant:  Name: Terrance Fulton   Age: 32  Number: [phone]  City: [place]: [place]  Relationship: son  Does person drive? yes    Name: El Martínez  Age: 28  Number: [phone]  City: [place]  Relationship:  granddaughter   Does person drive? yes    Living Will: no  Healthcare Power of : no Pt reports trusting dtr Niru with medical decisions as needed  Advance Directives on file: <<no information> per medical record.  Verbally reviewed LW/HCPA information.   provided patient with copy of LW/HCPA documents and provided education on completion of forms.    Living Donors: Yes.  Name: grandchildren have expressed interest. Education and resource information given to patient.    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    Spouse/Significant Other Employment: Pt denies     Disabled: no    Monthly Income:  Salary/Wages: $pt reports suitable income for all her life's needs   Able to afford all costs now and if transplanted, including medications: yes  Patient and Caregiver verbalizes understanding of personal responsibilities related to transplant costs and the importance of having a financial plan to ensure that patients transplant costs are fully covered.       provided fundraising information/education. Patient and Caregiver verbalizes understanding.   remains available.    Insurance:   Payer/Plan Subscr  Sex Relation Sub. Ins. ID  Effective Group Num   1. BLUE CROSS BL* JOSE ALMAZAN 1960 Female Self [id number] 1/1/23 [id number]                                   [address] [address]   [facility] 1960 Female Self [id number] 11/1/22 1078                                   [address]     Primary Insurance (for UNOS reporting): Private Insurance through school board employer  Secondary Insurance (for UNOS reporting): Private Insurance  Patient and Caregiver verbalizes clear understanding that patient may experience difficulty obtaining and/or be denied insurance coverage post-surgery. This includes and is not limited to disability insurance, life insurance, health insurance, burial insurance, long term care insurance, and other insurances.      Patient and Caregiver also reports understanding that future health concerns related to or unrelated to transplantation may not be covered by patient's insurance.  Resources and information provided and reviewed.     Patient and Caregiver provides verbal permission to release any necessary information to outside resources for patient care and discharge planning.  Resources and information provided are reviewed.      Dialysis Adherence: Patient reports as highly compliant with all hemodialysis recommendations.  Adherence report requested.     Infusion Service: patient utilizing? no  Home Health: patient utilizing? no  DME: yes BPC, diabetic supplies   Pulmonary/Cardiac Rehab: Pt denies    ADLS:  Pt reports pt is independent with all ADLS including driving, bathing,walking,taking medications, cooking, housekeeping, eating, and shopping.      Adherence:  Adherence education and counseling provided.     Per History Section:  Past Medical History:   Diagnosis Date    Anemia, unspecified     Chronic kidney disease, stage 5     Diabetes mellitus     Diabetes mellitus, type 2     Hyperlipidemia     Hypertension     Mechanical complication of arteriovenous surgical fistula     Renal disorder   "    Social History     Tobacco Use    Smoking status: Never    Smokeless tobacco: Never   Substance Use Topics    Alcohol use: Never     Social History     Substance and Sexual Activity   Drug Use Never     Social History     Substance and Sexual Activity   Sexual Activity Not Currently       Per Today's Psychosocial:  Tobacco: none, patient denies any use.  Alcohol: none, patient denies any use.  Illicit Drugs/Non-prescribed Medications: none, patient denies any use.    Patient and Caregiver states clear understanding of the potential impact of substance use as it relates to transplant candidacy and is aware of possible random substance screening.  Substance abstinence/cessation counseling, education and resources provided and reviewed.     Arrests/DWI/Treatment/Rehab: patient denies    Psychiatric History:    Mental Health: Pt denies history of anxiety, depression and or overwhelming feelings of sadness at this time or in the past. Pt reports "it just is what it is and I can only do what I can. I am good right now". SW provided acknowledgement and encouragement.   Psychiatrist/Counselor: Pt denies currently or in the past and reports willingness to meet with psychiatry if required by transplant.   Medications:  Pt denies utilizing mental health medications currently or in the past  Suicide/Homicide Issues: Pt denies feelings of wanting to harm self or other currently or in the past  Safety at home: Pt reports feeling safe at home.     Knowledge: Patient and Caregiver states having clear understanding and realistic expectations regarding the potential risks and potential benefits of organ transplantation and organ donation and agrees to discuss with health care team members and support system members, as well as to utilize available resources and express questions and/or concerns in order to further facilitate the pt informed decision-making.  Resources and information provided and reviewed.    Patient and " Caregiver is aware of [facility]'s affiliation and/or partnership with agencies in home health care, LTAC, SNF, [facility], and other hospitals and clinics.    Understanding: Patient and Caregiver reports having a clear understanding of the many lifetime commitments involved with being a transplant recipient, including costs, compliance, medications, lab work, procedures, appointments, concrete and financial planning, preparedness, timely and appropriate communication of concerns, abstinence (ETOH, tobacco, illicit non-prescribed drugs), adherence to all health care team recommendations, support system and caregiver involvement, appropriate and timely resource utilization and follow-through, mental health counseling as needed/recommended, and patient and caregiver responsibilities.  Social Service Handbook, resources and detailed educational information provided and reviewed.  Educational information provided.    Patient and Caregiver also reports current and expected compliance with health care regime and states having a clear understanding of the importance of compliance.      Patient and Caregiver reports a clear understanding that risks and benefits may be involved with organ transplantation and with organ donation.       Patient and Caregiver also reports clear understanding that psychosocial risk factors may affect patient, and include but are not limited to feelings of depression, generalized anxiety, anxiety regarding dependence on others, post traumatic stress disorder, feelings of guilt and other emotional and/or mental concerns, and/or exacerbation of existing mental health concerns.  Detailed resources provided and discussed.      Patient and Caregiver agrees to access appropriate resources in a timely manner as needed and/or as recommended, and to communicate concerns appropriately.  Patient and Caregiver also reports a clear understanding of treatment options available.     Patient and Caregiver received  education in a group setting.   reviewed education, provided additional information, and answered questions.    Feelings or Concerns: Pt denies having any concerns and or overwhelming feelings regarding transplant at this time.       Coping: Identify Patient & Caregiver Strategies to [place]:   1. In the past, coping with major surgery and/or related stress - riding 4 wheelers , spending time with family and camping   2. Currently & Pre-transplant - riding 4 wheelers , spending time with family and camping   3. At the time of surgery - spending time with family   4. During post-Transplant & Recovery Period - spending time with family    Goals: Pt reports post transplant goal of going on vacation.    Interview Behavior: Patient and Caregiver presents as alert and oriented x 4, pleasant, good eye contact, well groomed, recall good, concentration/judgement good, average intelligence, calm, communicative, cooperative, and asking and answering questions appropriately. Per pt request, pt's dtr Niru remained during assessment. Pt and dtr were highly engaged and motivated for transplant.         Transplant Social Work - Candidacy  Assessment/Plan:     Psychosocial Suitability: Patient presents as a suitable candidate for transplant at this time. Based on psychosocial risk factors, patient presents as low risk, due to absence of overwhelming psychosocial concerns .    Recommendations/Additional Comments: SW recommends that pt conduct fundraising to assist pt with pay for cost of medications, food, gas, and other transplant related needs. SW recommends that pt remain aware of potential mental health concerns and contact the team if any concerns arise. SW recommends that pt remain abstinent from tobacco, ETOH, and drug use. SW supports pt's continued dialysis adherence. SW remains available to answer any questions or concerns that arise as the pt moves through the transplant process.     ELLEN Randall,  KRYSTLESW

## 2023-10-06 NOTE — PROGRESS NOTES
PHARM.D. PRE-TRANSPLANT NOTE:    This patient's medication therapy was evaluated as part of her pre-transplant evaluation.      The following general pharmacologic concerns were noted:GLP1 agonists may increase aspiration risk during general anesthesia and deep sedation     The following concerns for post-operative pain management were noted: none    The following pharmacologic concerns related to HCV therapy were noted: none      This patient's medication profile was reviewed for considerations for DAA Hepatitis C therapy:    [x]  No current inducers of CYP 3A4 or PGP  [x]  No amiodarone on this patient's EMR profile in the last 24 months  [x]  No past or current atrial fibrillation on this patient's EMR profile       Current Outpatient Medications   Medication Sig Dispense Refill    allopurinoL (ZYLOPRIM) 100 MG tablet Take 100 mg by mouth once daily.      amLODIPine (NORVASC) 5 MG tablet Take 5 mg by mouth once daily.      atorvastatin (LIPITOR) 40 MG tablet Take 1 tablet (40 mg total) by mouth once daily. 90 tablet 3    cholecalciferol, vitamin D3, 125 mcg (5,000 unit) capsule Take 1 capsule by mouth once daily.      fenofibrate (TRICOR) 145 MG tablet fenofibrate nanocrystallized 145 mg tablet   TAKE ONE TABLET BY MOUTH ONCE DAILY to reduce triglycerides.      furosemide (LASIX) 80 MG tablet Take 1 tablet (80 mg total) by mouth 2 (two) times daily. 60 tablet 11    labetaloL (NORMODYNE) 200 MG tablet Take 1 tablet (200 mg total) by mouth 2 (two) times daily. 60 tablet 11    losartan (COZAAR) 100 MG tablet losartan 100 mg tablet   TAKE 1/2 TABLET BY MOUTH EVERY DAY      sevelamer carbonate (RENVELA) 0.8 gram PwPk Take 1 packet by mouth 3 (three) times daily with meals.      sodium bicarbonate 650 MG tablet Take 2 tablets (1,300 mg total) by mouth 2 (two) times daily. (Patient taking differently: Take 650 mg by mouth 2 (two) times daily.) 120 tablet 11    TRULICITY 4.5 mg/0.5 mL pen injector Inject 1.5 mg into  the skin once a week.       No current facility-administered medications for this visit.           I am available for consultation and can be contacted, as needed by the other members of the Transplant team.

## 2023-10-09 DIAGNOSIS — B89 UNSPECIFIED PARASITIC DISEASE: Primary | ICD-10-CM

## 2023-10-09 RX ORDER — IVERMECTIN 3 MG/1
TABLET ORAL
Qty: 20 TABLET | Refills: 0 | Status: SHIPPED | OUTPATIENT
Start: 2023-10-09 | End: 2024-02-20 | Stop reason: SDUPTHER

## 2023-10-12 ENCOUNTER — TELEPHONE (OUTPATIENT)
Dept: TRANSPLANT | Facility: CLINIC | Age: 63
End: 2023-10-12
Payer: COMMERCIAL

## 2023-11-17 ENCOUNTER — TELEPHONE (OUTPATIENT)
Dept: TRANSPLANT | Facility: CLINIC | Age: 63
End: 2023-11-17
Payer: COMMERCIAL

## 2023-11-17 NOTE — TELEPHONE ENCOUNTER
Spoke w/pt regarding outside testing. Pt states that she sent in her colonoscopy records and wanted to know should she have another one. I informed pt that her 2017 colonoscopy recommended a repeat in 5 years, so yes, she would need another one. I also informed pt that I've received her stress test. Pt has echo and cards visit schedule 12/17 with Dr. Diaz. Pt had mammo at [facility], records requested. Pt is still setting up her GYN visit and will call with those dates.

## 2023-11-17 NOTE — TELEPHONE ENCOUNTER
----- Message from Jessica Aceves sent at 11/17/2023  8:43 AM CST -----  Regarding: return call  Contact: Veronica  Caller:Veronica        Returning call to: Deven, to give appt visits and appt scheduled.         Caller can be reached @: [phone] ([email or web address])   Requesting a call back.

## 2023-12-08 LAB — HBV SURFACE AB SER QL IA: NORMAL

## 2023-12-28 ENCOUNTER — DOCUMENTATION ONLY (OUTPATIENT)
Dept: ONCOLOGY | Facility: HOSPITAL | Age: 63
End: 2023-12-28
Payer: COMMERCIAL

## 2023-12-28 NOTE — PROGRESS NOTES
Received voicemail from patient looking for Agnes HARRIS. Said she had a colon scope today and is finished with all that is needed for her kidney transplant. She is asking that someone return her call at [phone].     Secure Chat sent to April Castano LMSW.       Ghazal De La Vega MSW, [facility]  Oncology Social Worker  [facility]   [phone]

## 2024-01-03 ENCOUNTER — DOCUMENTATION ONLY (OUTPATIENT)
Dept: HEMATOLOGY/ONCOLOGY | Facility: CLINIC | Age: 64
End: 2024-01-03
Payer: COMMERCIAL

## 2024-01-03 NOTE — PROGRESS NOTES
Received voicemail from patient requesting update on records regarding her kidney transplant.     Secure Chat sent to April Castano LMSW asking for direct number to provide patient so that pt reaches correct department/individual. No response received.     Message previously sent to April MENENDEZ regarding this patient regarding last call received.     ELLEN Archuleta, [facility]  Oncology Social Worker  [facility]   [phone]

## 2024-01-04 ENCOUNTER — DOCUMENTATION ONLY (OUTPATIENT)
Dept: HEMATOLOGY/ONCOLOGY | Facility: CLINIC | Age: 64
End: 2024-01-04
Payer: COMMERCIAL

## 2024-01-04 ENCOUNTER — TELEPHONE (OUTPATIENT)
Dept: TRANSPLANT | Facility: CLINIC | Age: 64
End: 2024-01-04
Payer: COMMERCIAL

## 2024-01-04 NOTE — TELEPHONE ENCOUNTER
Returned pt's call and informed her that her chart is with the nurse for review. Pt verbalized understanding.

## 2024-01-04 NOTE — TELEPHONE ENCOUNTER
"SW followed up with pt regarding the below concerns and informed coordinator/.       "Hey!!  I spoke with this pt and she reports she has completed some follow up (including gyn visit). She was  not sure if we have received those documents. I informed her some documents were received but we were awaiting the others (per [facility])  I told her I would let you both know in case you wanted to call and follow up with her. She seems a bit worried.  Thank you!"      KIMBERLY remains available to pt and family.   "

## 2024-01-04 NOTE — PROGRESS NOTES
Received secure chat message back from April Castano LMSW with her call back number.     SW called patient to inform that she has the incorrect number/department but provides number for April HARRIS regarding kidney transplant questions. Patient thanks KIMBERLY for calling.     ELLEN Archuleta, [facility]  Oncology Social Worker  [facility]   [phone]

## 2024-01-04 NOTE — TELEPHONE ENCOUNTER
----- Message from Veronica Mckeon sent at 1/4/2024  8:14 AM CST -----  Regarding: Speak to Deven  Contact: PT  [phone]  The patient called requesting to speak to Deven. Please return call at your convenience. She is asking if you have  recieved her results    No further information provided      Patient can be contacted @# [phone]

## 2024-01-19 ENCOUNTER — EPISODE CHANGES (OUTPATIENT)
Dept: TRANSPLANT | Facility: CLINIC | Age: 64
End: 2024-01-19

## 2024-02-15 ENCOUNTER — TELEPHONE (OUTPATIENT)
Dept: TRANSPLANT | Facility: CLINIC | Age: 64
End: 2024-02-15
Payer: COMMERCIAL

## 2024-02-19 ENCOUNTER — TELEPHONE (OUTPATIENT)
Dept: TRANSPLANT | Facility: CLINIC | Age: 64
End: 2024-02-19
Payer: COMMERCIAL

## 2024-02-20 ENCOUNTER — TELEPHONE (OUTPATIENT)
Dept: TRANSPLANT | Facility: CLINIC | Age: 64
End: 2024-02-20
Payer: COMMERCIAL

## 2024-02-20 ENCOUNTER — PATIENT MESSAGE (OUTPATIENT)
Dept: TRANSPLANT | Facility: CLINIC | Age: 64
End: 2024-02-20
Payer: COMMERCIAL

## 2024-02-20 DIAGNOSIS — B89 UNSPECIFIED PARASITIC DISEASE: ICD-10-CM

## 2024-02-20 RX ORDER — IVERMECTIN 3 MG/1
TABLET ORAL
Qty: 20 TABLET | Refills: 0 | Status: SHIPPED | OUTPATIENT
Start: 2024-02-20

## 2024-02-20 RX ORDER — IVERMECTIN 3 MG/1
TABLET ORAL
Qty: 20 TABLET | Refills: 0 | Status: SHIPPED | OUTPATIENT
Start: 2024-02-20 | End: 2024-02-20 | Stop reason: SDUPTHER

## 2024-02-20 NOTE — TELEPHONE ENCOUNTER
MA notes per Adherence form     FOR THE PAST THREE MONTHS:    0-AMA's  0-No-shows    No concerns with care giving, transportation, or mental health    Scanned in pt's media    Tata Armijo  Abdominal Transplant MA

## 2024-02-23 ENCOUNTER — COMMITTEE REVIEW (OUTPATIENT)
Dept: TRANSPLANT | Facility: CLINIC | Age: 64
End: 2024-02-23
Payer: COMMERCIAL

## 2024-02-23 NOTE — LETTER
February 23, 2024    Sanya Waterman  [address] [place] [address]  Phone: [phone]  Fax: [phone]             Dear Dr. Sanya Waterman:    Patient: Veronica Arauz   MR Number: [id number]   YOB: 1960     Your patient, Veronica Arauz, was recently discussed at the [facility] Selection Committee meeting on 2/23/2024. I am happy to inform you that Veronica has been approved for transplantation.  She has met selection criteria for a kidney transplant related to ESRD secondary to primary diagnosis of Diabetes Mellitus - Type II. Your patient will be placed on the cadaveric wait list pending final financial approval from insurance company.     We appreciate your confidence in allowing us to participate in your patients care.  If you have any questions or concerns, please do not hesitate to contact me.    Sincerely,      Agnes Apple MD  Medical Director, Kidney & Kidney/Pancreas Transplantation

## 2024-02-23 NOTE — COMMITTEE REVIEW
Native Organ Dx: Diabetes Mellitus - Type II      SELECTION COMMITTEE NOTE    Veronica Arauz was presented at selection committee on 2/23/2024.  Patient met selection criteria for kidney transplant related to ESRD due to   Diabetes Mellitus - Type II.  No absolute contraindications to transplant at this time.  Patient will be placed on the cadaveric wait list pending final financial approval from insurance company.  Patient will return to clinic for routine appointment in 1 year(s). Patient meets criteria for High KDPI kidney offer. Patient does not meet HCV+ kidney offer. Patient meets criteria for dual/enbloc, due to patient choice/guidelines. Patient will need iliac ultrasound with listed visit.     Planned immunosuppression Thymogloblin.        Note written by Miladis French RN    ===============================================    I was present at the meeting and attest to the decision of the committee

## 2024-03-25 ENCOUNTER — TELEPHONE (OUTPATIENT)
Dept: TRANSPLANT | Facility: CLINIC | Age: 64
End: 2024-03-25
Payer: COMMERCIAL

## 2024-03-25 DIAGNOSIS — Z76.82 ORGAN TRANSPLANT CANDIDATE: Primary | ICD-10-CM

## 2024-03-25 NOTE — TELEPHONE ENCOUNTER
"KIDNEY WAIT LISTING NOTE    Date of Financial clearance to list: 3/7/2024    SSN/[facility]:     Organ: Kidney    Last Name: Davonte  First Name: Veronica    : 1960       Gender: female        MRN#: [id number]                                   State of Permanent Residence: [address] [place] [address]    Ethnicity: Not  or /a   Race:      Black or     CLINICAL INFORMATION   Candidate Medical Urgency Status: Active (1)  Number of Previous Kidney Transplants: 0  Number of Previous Solid Organ Transplants: 0  Did you enter number of previous kidney or other solid organ transplants? Yes  Is this Candidate a Prior Living Donor: No  (If yes, please generate letter to UNOS with patient's date of donation, recipient SSN, signed by Surgical Director after patient is listed in order to receive priority points).      ABO  ABO Blood Group:   O POS     ABO Confirmation: (THESE DATES MUST BE PRIOR TO THE LIST DATE AND SUPPORTED BY SEPARATE LAB REPORTS)    Internal Results    Lab Results   Component Value Date    GROUPTRH O POS 10/05/2023    GROUPTRH O POS 2023     No results found for: "ABO"    External Results    ABO Date 1:    ABO Date 2  Are either of these ABO results based on External Labs? No  (If Yes, STOP and go to source document in Media Tab for verification).    VITALS  Height:  5'0"  Weight:  80.9 kg  (Use height from Transplant clinic visits only).  Did you enter height/weight? Yes    HLA    Class I:  Lab Results   Component Value Date    XPNQ8LU 2 10/05/2023    LXSE7CK XX 10/05/2023    JWRJ6HB 50 10/05/2023    XFKS8WK 52 10/05/2023    KKOVQ1RD 6 10/05/2023    EMJGI6OI 4 10/05/2023    VVSUN0JK 6 10/05/2023    GWYZA9CD 16 10/05/2023       Class II:  Lab Results   Component Value Date    ANZXGZ13OR 17 10/05/2023    DIVBWE72TA 7 10/05/2023    DTLVLL978LF 52 10/05/2023    UDDHBA5921 53 10/05/2023    LVNDH4NR 2 10/05/2023    VBJWQ3UX XX 10/05/2023       Tested for HLA " "Antibodies: Yes, antibodies detected     If result is "Positive" antibodies are detected     If result is "Negative or questionable" no antibodies detected    No results found for: "CIPRAS", "CIIPRAS"    DIALYSIS INFORMATION  Is patient Pre-Dialysis: No     GFR Information  Report GFR being used as the criteria for placement on the kidney list. If not, leave blank  GFR < or = 20 ml/min? n/a  If Yes, Specify value  ___   ml/min     Initial date GFR became 20 or less:   Is GFR obtained from an Outside lab Result? n/a  (If YES verify with source document scanned into media)    If patient on Dialysis:    Is candidate currently on dialysis for ESRD? Yes  If Yes,  Date Chronic Dialysis Started:   9/6/2023  (verify with source document in Media Tab)   Dialysis Unit Name: [facility] [address]                        Physician Name:  Dr. Agnes Hendricks  [facility]#: [id number]    DIABETES INFORMATION  Primary Native Kidney Diagnosis: Diabetes Mellitus - Type II  C-Peptide Value - No results found for: "CPEPTIDE"  Current Diabetes Status: Type II    FOR NON-KIDNEY DEPARTMENT USE ONLY:  Additional Organs Registered? none    Maximum Acceptable Number of HLA Mismatches  ABDR:     6      (0-6)               AB:               (0-4)  ADR:   _____  (0-4)              BDR: _____ (0-4)  A:        _____  (0-2)              B:      _____ (0-2)          DR: ______ (0-2)    Will Recipient Accept?   Accept HBcAB Positive Organ:            Yes  Accept HBV JOHANA Positive Organ:        No  Accept HCV Antibody Positive Organ: No   Accept HCV JOHANA Positive Organ: No    Dual Kidney and En Bloc Opt In : No  Dual  Local:   No  Dual Import:   No  En Bloc Local:   No  En Bloc Import: No     Accept KDPI > 85: Single: No     Local: No     Import: No  Accept KDPI > 85: Dual: No     Local: No     Import: No    ### NURSE TO VERIFY CONSENT AND MAKE ANY NECESSARY CHANGES NEEDED IN UNET AT THE TIME OF VERIFICATION " ###    Unacceptible Antigens  If yes, list     Lab Results   Component Value Date    CIABCLM WEAK Cw17 10/05/2023    CIIAB DQ9,DQ8,DQA1*05:05,DQA1*05:03 10/05/2023       ### DO NOT LIST IF ANTIGEN VALUE WEAK ###    eGFR Wait Time Modification    Based on Race Black or     does patient qualify for lab review? Yes      If yes, were qualifying SPAN 20 labs found? No      If found, generate eGFR Wait Time Modification Form and scan into Media.   Send patient letter when wait time is granted.     Hep B Vaccine series completed: yes    Blood Type x2 was verified by myself and Marycarmen.  Blood type determination and reporting was completed according to the programs protocols and OPTN requirements.

## 2024-03-25 NOTE — LETTER
2024  Veronica Arauz  [address] [facility] [address]        Dear Veronica Arauz:  MRN: [id number]    Congratulations! On 3/25/2024, you were placed on  the waiting list for a  donor transplant.    Your candidacy for kidney transplant is based on the following criteria: ESRD.    Your transplant coordinator while on the waiting list is Sharee Mcneal RN. They can be reached at [phone] or [phone] with any questions.      What to do now?    Ask your living donors to begin testing   Share our screening website with anyone interested: [email or web address]  Make sure donors have your name and date of birth  You will get transplanted much faster if you have a living donor    Have your blood sent to our Transplant Lab every month  If you are on dialysis - our Transplant Lab will work with your dialysis unit to send your blood every month  If you are not on dialysis   If you live near an [facility] lab, we will schedule you to have blood drawn every month  If you do not live near an [facility] lab, you will be sent blood kits in the mail. You will need to take a kit to your local lab or doctor to have your blood drawn every month and mail to the Transplant Lab.     Call us with ANY CHANGES  Phone numbers - we must be able to reach you anytime of the day or night when a kidney is available  Address  Insurance coverage  Dialysis unit or kidney doctor  Mode: if you have surgery, stay in the hospital, have to get blood, or have an infection    Review your Kidney/Kidney-Pancreas Transplant Guide   This will give you detailed information about what happens when  you are on the waiting list   you are called when a kidney is available    The [facility] has a transplant surgeon and physician available 365 days a year, 24 hours a day to coordinate organ acceptance, procurement, surgical placement and to address urgent patient care issues.  You will be notified in writing of any  changes to our Transplant Centers staffing plan that would impact your ability to receive a transplant.    Attached is a letter from the United Network for Organ Sharing (UNOS). It describes the services and information offered to patients by UNOS and the Organ Procurement and Transplant Network. We look forward to working with you while on the waiting list.     We would like to inform you of an important OPTN (Organ Procurement and Transplant Network) Policy change that may affect the waiting time for some candidates on our waiting list.     Waiting time is important in identifying who receives offers for kidneys. A long wait time may increase your chance of getting an offer. Wait time is based on a test called eGFR that tells how well your kidneys are working. Wait time could also be based on how long you have been on dialysis. Government and health officials have changed the way this test is used. Before this year, hospitals used an eGFR that would include your race. For Black or  Americans, this eGFR could have shown that their kidneys were working better than they were.    Because of this change, we are looking at everyones record and assessing waiting time for people who are eligible. We will be reviewing everyones medical records and will contact you if you are eligible.     Who can I talk to if I have a question?  You can contact us if you have questions or send a message through [facility].     Please give us time to answer your questions. We are working on this for many patients.    How can I learn more about eGFR and this policy change?  Go to OPTN website > Patients > Kidney > FAQ: Understanding race-neutral eGFR calculations  Full URL: https://optn.transplant.hrsa.gov/patients/by-organ/kidney/understanding-the-proposal-to-require-race-neutral-egfr-calculations/  Call the Organ Procurement and Transplantation Network (OPTN) toll-free patient services line at  8-450-017-9796    Congratulations,    Your Transplant Partner  [facility]   [facility] [place] [address]  [phone]                                                                The Organ Procurement and Transplantation Network   Toll-free patient services line: [phone]  Your resource for organ transplant information      Staffed 8:30 am - 5:00 pm ET Monday - Friday   Leave a message 24/7 to receive a call back    The Organ Procurement and Transplantation Network (OPTN) is the national transplant system. It makes the policies that decide how donated organs are matched to patients waiting for a transplant. The OPTN:    Makes sure donated organs get matched to people on the transplant waiting list  Tells people about the donation and transplant processes  Makes sure that the public knows about the need for more organ and tissue donations    The OPTN has a free patient services line that you can call to:  Get more information about:   o Organ donation and organ transplants   o Donation and transplant policies  Get an information kit with:   o A list of transplant hospitals   o Waiting list information  Talk about any questions you may have about your transplant hospital or organ procurement organization. The staff will do their best to help you or point you to others who may help.  Find out how you can volunteer with the OPTN and help shape transplant policy    The patient services line number is: 3-690-469-7965    Patient services line staff CANNOT answer questions about your own medical care, including:  Waiting list status  Test results  Medical records  You will need to call your transplant hospital for this information.    The following websites have more information about transplantation and donation:  OPTN: https://optn.transplant.[facility].gov/  For potential living donors and transplant recipients:   o Living with transplant:  https://www.transplantliving.org/   o Living donation process: https://optn.transplant.hrsa.gov/living-donation/     o Financial assistance: https://www.livingdonorassistance.org/  Transplantation data: https://www.srtr.org/  Organ donation: https://www.organdonor.gov/    Volunteer with the OPTN: https://optn.transplant.hrsa.gov/get-involved

## 2024-04-05 DIAGNOSIS — Z76.82 PRE-KIDNEY TRANSPLANT, LISTED: Primary | ICD-10-CM

## 2024-04-08 PROCEDURE — 86832 HLA CLASS I HIGH DEFIN QUAL: CPT | Mod: TXP | Performed by: NURSE PRACTITIONER

## 2024-04-08 PROCEDURE — 86833 HLA CLASS II HIGH DEFIN QUAL: CPT | Mod: TXP | Performed by: NURSE PRACTITIONER

## 2024-04-12 ENCOUNTER — LAB VISIT (OUTPATIENT)
Dept: LAB | Facility: HOSPITAL | Age: 64
End: 2024-04-12
Payer: COMMERCIAL

## 2024-04-12 DIAGNOSIS — Z76.82 PRE-KIDNEY TRANSPLANT, LISTED: ICD-10-CM

## 2024-04-30 LAB — HPRA INTERPRETATION: NORMAL

## 2024-05-08 PROCEDURE — 99001 SPECIMEN HANDLING PT-LAB: CPT | Mod: TXP | Performed by: NURSE PRACTITIONER

## 2024-05-18 ENCOUNTER — LAB VISIT (OUTPATIENT)
Dept: LAB | Facility: HOSPITAL | Age: 64
End: 2024-05-18
Payer: COMMERCIAL

## 2024-05-18 DIAGNOSIS — Z76.82 PRE-KIDNEY TRANSPLANT, LISTED: ICD-10-CM

## 2024-05-31 LAB
HLA FREEZE AND HOLD INTERPRETATION: NORMAL
HLAFH COLLECTION DATE: NORMAL
HPRA INTERPRETATION: NORMAL

## 2024-06-17 PROCEDURE — 86833 HLA CLASS II HIGH DEFIN QUAL: CPT | Mod: TXP | Performed by: NURSE PRACTITIONER

## 2024-06-17 PROCEDURE — 86832 HLA CLASS I HIGH DEFIN QUAL: CPT | Mod: TXP | Performed by: NURSE PRACTITIONER

## 2024-06-20 ENCOUNTER — LAB VISIT (OUTPATIENT)
Dept: LAB | Facility: HOSPITAL | Age: 64
End: 2024-06-20
Payer: COMMERCIAL

## 2024-06-20 DIAGNOSIS — Z76.82 PRE-KIDNEY TRANSPLANT, LISTED: ICD-10-CM

## 2024-07-02 LAB — HPRA INTERPRETATION: NORMAL

## 2024-07-10 PROCEDURE — 99001 SPECIMEN HANDLING PT-LAB: CPT | Mod: TXP | Performed by: NURSE PRACTITIONER

## 2024-07-13 ENCOUNTER — LAB VISIT (OUTPATIENT)
Dept: LAB | Facility: HOSPITAL | Age: 64
End: 2024-07-13
Payer: COMMERCIAL

## 2024-07-13 DIAGNOSIS — Z76.82 PRE-KIDNEY TRANSPLANT, LISTED: ICD-10-CM

## 2024-08-12 PROCEDURE — 86832 HLA CLASS I HIGH DEFIN QUAL: CPT | Mod: TXP | Performed by: NURSE PRACTITIONER

## 2024-08-12 PROCEDURE — 86833 HLA CLASS II HIGH DEFIN QUAL: CPT | Mod: TXP | Performed by: NURSE PRACTITIONER

## 2024-08-15 ENCOUNTER — LAB VISIT (OUTPATIENT)
Dept: LAB | Facility: HOSPITAL | Age: 64
End: 2024-08-15
Payer: COMMERCIAL

## 2024-08-15 DIAGNOSIS — Z76.82 PRE-KIDNEY TRANSPLANT, LISTED: ICD-10-CM

## 2024-09-04 LAB — HPRA INTERPRETATION: NORMAL

## 2024-09-09 PROCEDURE — 99001 SPECIMEN HANDLING PT-LAB: CPT | Mod: TXP | Performed by: NURSE PRACTITIONER

## 2024-09-13 ENCOUNTER — LAB VISIT (OUTPATIENT)
Dept: LAB | Facility: HOSPITAL | Age: 64
End: 2024-09-13
Payer: COMMERCIAL

## 2024-09-13 DIAGNOSIS — Z76.82 PRE-KIDNEY TRANSPLANT, LISTED: ICD-10-CM

## 2024-10-09 PROCEDURE — 86832 HLA CLASS I HIGH DEFIN QUAL: CPT | Mod: TXP | Performed by: NURSE PRACTITIONER

## 2024-10-09 PROCEDURE — 86977 RBC SERUM PRETX INCUBJ/INHIB: CPT | Mod: TXP | Performed by: NURSE PRACTITIONER

## 2024-10-09 PROCEDURE — 86833 HLA CLASS II HIGH DEFIN QUAL: CPT | Mod: TXP | Performed by: NURSE PRACTITIONER

## 2024-10-17 ENCOUNTER — LAB VISIT (OUTPATIENT)
Dept: LAB | Facility: HOSPITAL | Age: 64
End: 2024-10-17
Payer: COMMERCIAL

## 2024-10-17 DIAGNOSIS — Z76.82 PRE-KIDNEY TRANSPLANT, LISTED: ICD-10-CM

## 2024-10-20 LAB
CLASS I ANTIBODY COMMENTS - LUMINEX: NORMAL
CLASS II ANTIBODIES - LUMINEX: NORMAL
CPRA %: 93
SERUM COLLECTION DT - LUMINEX CLASS I: NORMAL
SERUM COLLECTION DT - LUMINEX CLASS II: NORMAL
SPCL1 TESTING DATE: NORMAL
SPCL2 TESTING DATE: NORMAL
SPCLU TESTING DATE: NORMAL

## 2024-10-29 ENCOUNTER — TELEPHONE (OUTPATIENT)
Dept: TRANSPLANT | Facility: CLINIC | Age: 64
End: 2024-10-29
Payer: COMMERCIAL

## 2024-10-29 ENCOUNTER — LAB VISIT (OUTPATIENT)
Dept: LAB | Facility: HOSPITAL | Age: 64
End: 2024-10-29
Payer: COMMERCIAL

## 2024-10-29 DIAGNOSIS — Z76.82 ORGAN TRANSPLANT CANDIDATE: ICD-10-CM

## 2024-10-29 DIAGNOSIS — Z76.82 AWAITING ORGAN TRANSPLANT STATUS: Primary | ICD-10-CM

## 2024-10-29 LAB
HLA VIRTUAL CROSSMATCH INTERPRETATION: NORMAL
HLVXM TESTING DATE: NORMAL

## 2024-10-30 ENCOUNTER — TELEPHONE (OUTPATIENT)
Dept: TRANSPLANT | Facility: CLINIC | Age: 64
End: 2024-10-30
Payer: COMMERCIAL

## 2024-11-11 PROCEDURE — 99001 SPECIMEN HANDLING PT-LAB: CPT | Mod: TXP | Performed by: NURSE PRACTITIONER

## 2024-11-16 ENCOUNTER — LAB VISIT (OUTPATIENT)
Dept: LAB | Facility: HOSPITAL | Age: 64
End: 2024-11-16
Payer: COMMERCIAL

## 2024-11-16 DIAGNOSIS — Z76.82 PRE-KIDNEY TRANSPLANT, LISTED: ICD-10-CM

## 2024-12-11 PROCEDURE — 86833 HLA CLASS II HIGH DEFIN QUAL: CPT | Mod: TXP | Performed by: NURSE PRACTITIONER

## 2024-12-11 PROCEDURE — 86832 HLA CLASS I HIGH DEFIN QUAL: CPT | Mod: TXP | Performed by: NURSE PRACTITIONER

## 2024-12-16 ENCOUNTER — LAB VISIT (OUTPATIENT)
Dept: LAB | Facility: HOSPITAL | Age: 64
End: 2024-12-16
Payer: COMMERCIAL

## 2024-12-16 DIAGNOSIS — Z76.82 PRE-KIDNEY TRANSPLANT, LISTED: ICD-10-CM

## 2024-12-18 DIAGNOSIS — Z76.82 PRE-KIDNEY TRANSPLANT, LISTED: Primary | ICD-10-CM

## 2024-12-27 LAB — HPRA INTERPRETATION: NORMAL

## 2025-01-07 ENCOUNTER — LAB VISIT (OUTPATIENT)
Dept: LAB | Facility: HOSPITAL | Age: 65
End: 2025-01-07
Payer: COMMERCIAL

## 2025-01-07 DIAGNOSIS — Z76.82 ORGAN TRANSPLANT CANDIDATE: ICD-10-CM

## 2025-01-07 DIAGNOSIS — Z76.82 AWAITING ORGAN TRANSPLANT STATUS: Primary | ICD-10-CM

## 2025-01-07 LAB
HLA VIRTUAL CROSSMATCH INTERPRETATION: NORMAL
HLVXM TESTING DATE: NORMAL

## 2025-01-08 ENCOUNTER — TELEPHONE (OUTPATIENT)
Dept: TRANSPLANT | Facility: CLINIC | Age: 65
End: 2025-01-08
Payer: COMMERCIAL

## 2025-01-08 NOTE — TELEPHONE ENCOUNTER
On call note  UNOS : [id number]  I called and update patient on case. Patient informed that the kidneys will be pumped to measure pressures and decide if acceptable. Patient to be NPO now. I will call and update when I hear from the surgeons. Patient to go to HD today. Verbalized understanding and will keep phone nearby for updates.    1150 am--Stefano informed me that the pump numbers were unacceptable and Dr. Serrano has declined the kidneys due to poor pump numbers. Patient released from case and instructed to resume all melds if they were held. Verbalized understanding.

## 2025-01-08 NOTE — TELEPHONE ENCOUNTER
Late entry from 1/7/25 at 0945    ON-CALL NOTE    UNOS# [id number]    Notified by Fer Solis, , that Veronica Arauz is eligible for a back-up kidney offer.  Spoke with patient and identified no acute medical issues with telephone assessment. Protocol script read to patient regarding N/A, standard donor offer. Patient verbalized understanding, all questions answered, patient accepts organ offer. Notified by Fer Solis that virtual crossmatch is negative.  Current sample of blood is available from date 12/11/24 for crossmatch.  Patient reports no sensitizing event since last blood sample for PRA received. Notified Prema in HLA Lab to perform a prospective  crossmatch per guideline.    Patient was asked if they have had a positive COVID-19 test or if they have any signs or symptoms. Informed patient that they will be tested for COVID-19 upon arrival to the hospital, unless have a previous positive result. If tested and result is positive, the transplant will not be able to occur, they will be inactivated on the wait list for 21 days per protocol and required to quarantine.     Patient notified of plan for back-up and states understanding.     1900:  Patient notified that crossmatch was negative.  Donor OR now scheduled for 0600 on 1/8/25.  Patient to continue normal activities for now.  Next update will be late am to early pm on 1/8/25.  Patient verbalized understanding.    1/8/25 at 0700:  Case passed to Nadine Dominguez RN.

## 2025-01-13 PROCEDURE — 99001 SPECIMEN HANDLING PT-LAB: CPT | Mod: TXP | Performed by: NURSE PRACTITIONER

## 2025-01-25 ENCOUNTER — LAB VISIT (OUTPATIENT)
Dept: LAB | Facility: HOSPITAL | Age: 65
End: 2025-01-25
Payer: COMMERCIAL

## 2025-01-25 DIAGNOSIS — Z76.82 PRE-KIDNEY TRANSPLANT, LISTED: ICD-10-CM

## 2025-01-30 DIAGNOSIS — Z76.82 ORGAN TRANSPLANT CANDIDATE: ICD-10-CM

## 2025-01-30 DIAGNOSIS — N18.6 END STAGE RENAL DISEASE: Primary | ICD-10-CM

## 2025-02-03 ENCOUNTER — TELEPHONE (OUTPATIENT)
Dept: TRANSPLANT | Facility: CLINIC | Age: 65
End: 2025-02-03
Payer: COMMERCIAL

## 2025-02-03 NOTE — LETTER
February 3, 2025    Veronica Arauz  [address] [facility] [address]        Dear Veronica Arauz:  MRN: [id number]  : 1960    You are scheduled on 2025 for follow up in the transplant clinic to update your records and evaluate your health status as you wait for a transplant.  It is very important that we ensure you are ready for your transplant.      Please make arrangements to be in our transplant clinic from 12:30 pm- 4 pm.  During this time you will watch an educational video and then be seen by our transplant , financial counselor, and advance practice provider.     If you have had a change in your medical history since your last visit, please call your transplant coordinator to ensure we have the medical records to review prior to your appointment.     Please bring the following with you to this appointment:  A Caregiver is REQUIRED  Bring ALL insurance information including medication card  Current list of medications  Copies of any outside medical records from the past year, such as: mammogram, pap smear, ultrasound, CAT scan, colonoscopy, cardiac angiogram or cardiac stress test    To reschedule your appointments please call [phone] or [phone] ([phone]. [phone]). Please ask for the .      Failure to cancel in advance or arrive on time could result in a $50 cancellation fee.  Your transplant candidacy could be affected by no showing these appointments.  We look forward to seeing you.    Sincerely,    [facility] [place]  [facility] [place] [address]  [phone]

## 2025-02-10 PROCEDURE — 86833 HLA CLASS II HIGH DEFIN QUAL: CPT | Mod: TXP | Performed by: NURSE PRACTITIONER

## 2025-02-10 PROCEDURE — 86832 HLA CLASS I HIGH DEFIN QUAL: CPT | Mod: TXP | Performed by: NURSE PRACTITIONER

## 2025-02-14 ENCOUNTER — LAB VISIT (OUTPATIENT)
Dept: LAB | Facility: HOSPITAL | Age: 65
End: 2025-02-14
Payer: COMMERCIAL

## 2025-02-14 DIAGNOSIS — Z76.82 PRE-KIDNEY TRANSPLANT, LISTED: ICD-10-CM

## 2025-02-25 LAB — HPRA INTERPRETATION: NORMAL

## 2025-02-28 ENCOUNTER — TELEPHONE (OUTPATIENT)
Dept: TRANSPLANT | Facility: CLINIC | Age: 65
End: 2025-02-28
Payer: COMMERCIAL

## 2025-03-01 NOTE — TELEPHONE ENCOUNTER
ON-CALL NOTE    OS# [id number]    Notified by Ronal Enrique, , that Veronica Arauz is eligible for backup kidney offer.  Spoke with patient and identified no acute medical issues with telephone assessment. Protocol script read to patient regarding PHS risk criteria donor offer . Patient verbalized understanding, all questions answered, patient declines  PHS risk organ offer. Explained to patient that she will remain on the waiting list. Patient did not have any further questions at this time.  Ronal Enrique and Dr. Ho notified of patient's decision.

## 2025-03-12 PROCEDURE — 99001 SPECIMEN HANDLING PT-LAB: CPT | Mod: TXP | Performed by: NURSE PRACTITIONER

## 2025-03-19 ENCOUNTER — LAB VISIT (OUTPATIENT)
Dept: LAB | Facility: HOSPITAL | Age: 65
End: 2025-03-19
Payer: COMMERCIAL

## 2025-03-19 DIAGNOSIS — Z76.82 PRE-KIDNEY TRANSPLANT, LISTED: ICD-10-CM

## 2025-04-09 PROCEDURE — 86832 HLA CLASS I HIGH DEFIN QUAL: CPT | Mod: TXP | Performed by: NURSE PRACTITIONER

## 2025-04-09 PROCEDURE — 86833 HLA CLASS II HIGH DEFIN QUAL: CPT | Mod: TXP | Performed by: NURSE PRACTITIONER

## 2025-04-09 PROCEDURE — 36415 COLL VENOUS BLD VENIPUNCTURE: CPT | Mod: TXP

## 2025-04-11 ENCOUNTER — TELEPHONE (OUTPATIENT)
Dept: TRANSPLANT | Facility: CLINIC | Age: 65
End: 2025-04-11
Payer: COMMERCIAL

## 2025-04-14 ENCOUNTER — TELEPHONE (OUTPATIENT)
Dept: TRANSPLANT | Facility: CLINIC | Age: 65
End: 2025-04-14
Payer: COMMERCIAL

## 2025-04-15 ENCOUNTER — LAB VISIT (OUTPATIENT)
Dept: LAB | Facility: HOSPITAL | Age: 65
End: 2025-04-15
Payer: COMMERCIAL

## 2025-04-15 DIAGNOSIS — Z76.82 PRE-KIDNEY TRANSPLANT, LISTED: ICD-10-CM

## 2025-04-23 LAB — HPRA INTERPRETATION: NORMAL

## 2025-04-29 ENCOUNTER — HOSPITAL ENCOUNTER (OUTPATIENT)
Dept: RADIOLOGY | Facility: HOSPITAL | Age: 65
Discharge: HOME | End: 2025-04-29
Attending: NURSE PRACTITIONER
Payer: COMMERCIAL

## 2025-04-29 ENCOUNTER — RESULTS FOLLOW-UP (OUTPATIENT)
Dept: TRANSPLANT | Facility: CLINIC | Age: 65
End: 2025-04-29

## 2025-04-29 ENCOUNTER — OFFICE VISIT (OUTPATIENT)
Dept: OBSTETRICS AND GYNECOLOGY | Facility: CLINIC | Age: 65
End: 2025-04-29
Payer: COMMERCIAL

## 2025-04-29 ENCOUNTER — OFFICE VISIT (OUTPATIENT)
Dept: TRANSPLANT | Facility: CLINIC | Age: 65
End: 2025-04-29
Payer: COMMERCIAL

## 2025-04-29 ENCOUNTER — HOSPITAL ENCOUNTER (OUTPATIENT)
Dept: CARDIOLOGY | Facility: HOSPITAL | Age: 65
Discharge: HOME | End: 2025-04-29
Attending: NURSE PRACTITIONER
Payer: COMMERCIAL

## 2025-04-29 VITALS
DIASTOLIC BLOOD PRESSURE: 80 MMHG | HEIGHT: 60 IN | WEIGHT: 181.69 LBS | RESPIRATION RATE: 20 BRPM | BODY MASS INDEX: 35.67 KG/M2 | SYSTOLIC BLOOD PRESSURE: 199 MMHG | HEART RATE: 78 BPM | OXYGEN SATURATION: 95 % | TEMPERATURE: 97 F

## 2025-04-29 VITALS
BODY MASS INDEX: 34.3 KG/M2 | HEART RATE: 80 BPM | BODY MASS INDEX: 34.95 KG/M2 | WEIGHT: 178 LBS | HEIGHT: 60 IN | HEIGHT: 61 IN | DIASTOLIC BLOOD PRESSURE: 74 MMHG | DIASTOLIC BLOOD PRESSURE: 91 MMHG | WEIGHT: 181.69 LBS | SYSTOLIC BLOOD PRESSURE: 193 MMHG | SYSTOLIC BLOOD PRESSURE: 162 MMHG

## 2025-04-29 DIAGNOSIS — Z76.82 ORGAN TRANSPLANT CANDIDATE: ICD-10-CM

## 2025-04-29 DIAGNOSIS — Z01.419 WELL WOMAN EXAM WITH ROUTINE GYNECOLOGICAL EXAM: Primary | ICD-10-CM

## 2025-04-29 DIAGNOSIS — N18.6 TYPE 2 DIABETES MELLITUS WITH CHRONIC KIDNEY DISEASE ON CHRONIC DIALYSIS, WITHOUT LONG-TERM CURRENT USE OF INSULIN: ICD-10-CM

## 2025-04-29 DIAGNOSIS — Z76.82 PATIENT ON WAITING LIST FOR KIDNEY TRANSPLANT: Primary | ICD-10-CM

## 2025-04-29 DIAGNOSIS — I12.0 BENIGN HYPERTENSION WITH ESRD (END-STAGE RENAL DISEASE): ICD-10-CM

## 2025-04-29 DIAGNOSIS — N18.6 ESRD (END STAGE RENAL DISEASE): ICD-10-CM

## 2025-04-29 DIAGNOSIS — E11.22 TYPE 2 DIABETES MELLITUS WITH CHRONIC KIDNEY DISEASE ON CHRONIC DIALYSIS, WITHOUT LONG-TERM CURRENT USE OF INSULIN: ICD-10-CM

## 2025-04-29 DIAGNOSIS — N18.6 BENIGN HYPERTENSION WITH ESRD (END-STAGE RENAL DISEASE): ICD-10-CM

## 2025-04-29 DIAGNOSIS — Z99.2 TYPE 2 DIABETES MELLITUS WITH CHRONIC KIDNEY DISEASE ON CHRONIC DIALYSIS, WITHOUT LONG-TERM CURRENT USE OF INSULIN: ICD-10-CM

## 2025-04-29 LAB
ASCENDING AORTA: 3 CM
AV AREA BY CONTINUOUS VTI: 1.8 CM2
AV INDEX (PROSTH): 0.68
AV LVOT MEAN GRADIENT: 5 MMHG
AV LVOT PEAK GRADIENT: 10 MMHG
AV MEAN GRADIENT: 13 MMHG
AV PEAK GRADIENT: 25 MMHG
AV VALVE AREA BY VELOCITY RATIO: 1.8 CM²
AV VALVE AREA: 1.9 CM2
AV VELOCITY RATIO: 0.64
BSA FOR ECHO PROCEDURE: 1.85 M2
CV ECHO LV RWT: 0.44 CM
DOP CALC AO PEAK VEL: 2.5 M/S
DOP CALC AO VTI: 54 CM
DOP CALC LVOT AREA: 2.8 CM2
DOP CALC LVOT DIAMETER: 1.9 CM
DOP CALC LVOT PEAK VEL: 1.6 M/S
DOP CALC LVOT STROKE VOLUME: 104 CM3
DOP CALCLVOT PEAK VEL VTI: 36.7 CM
E WAVE DECELERATION TIME: 177 MS
E/A RATIO: 1.06
E/E' RATIO: 33 M/S
ECHO EF ESTIMATED: 60 %
ECHO LV POSTERIOR WALL: 1.1 CM (ref 0.6–1.1)
EJECTION FRACTION: 60 %
FRACTIONAL SHORTENING: 32 % (ref 28–44)
INTERVENTRICULAR SEPTUM: 1.1 CM (ref 0.6–1.1)
IVC DIAMETER: 1.7 CM
LA MAJOR: 5.7 CM
LA MINOR: 5.2 CM
LA WIDTH: 4.2 CM
LEFT ATRIUM SIZE: 4.3 CM
LEFT ATRIUM VOLUME INDEX MOD: 41 ML/M2
LEFT ATRIUM VOLUME INDEX: 47 ML/M2
LEFT ATRIUM VOLUME MOD: 73 ML
LEFT ATRIUM VOLUME: 83 CM3
LEFT INTERNAL DIMENSION IN SYSTOLE: 3.4 CM (ref 2.1–4)
LEFT VENTRICLE DIASTOLIC VOLUME INDEX: 66.29 ML/M2
LEFT VENTRICLE DIASTOLIC VOLUME: 118 ML
LEFT VENTRICLE MASS INDEX: 116.4 G/M2
LEFT VENTRICLE SYSTOLIC VOLUME INDEX: 26.4 ML/M2
LEFT VENTRICLE SYSTOLIC VOLUME: 47 ML
LEFT VENTRICULAR INTERNAL DIMENSION IN DIASTOLE: 5 CM (ref 3.5–6)
LEFT VENTRICULAR MASS: 207.1 G
LV LATERAL E/E' RATIO: 29.8
LV SEPTAL E/E' RATIO: 37.3
MV A" WAVE DURATION": 97.05 MS
MV PEAK A VEL: 1.4 M/S
MV PEAK E VEL: 1.49 M/S
OHS CV RV/LV RATIO: 0.96 CM
PISA TR MAX VEL: 3.5 M/S
PULM VEIN A" WAVE DURATION": 97.05 MS
PULM VEIN S/D RATIO: 0.67
PULMONIC VEIN PEAK A VELOCITY: 0.3 M/S
PV PEAK D VEL: 0.82 M/S
PV PEAK S VEL: 0.55 M/S
RA MAJOR: 4.83 CM
RA PRESSURE ESTIMATED: 3 MMHG
RA WIDTH: 3.78 CM
RIGHT ATRIAL AREA: 19.3 CM2
RIGHT VENTRICLE DIASTOLIC BASEL DIMENSION: 4.8 CM
RV TB RVSP: 7 MMHG
RV TISSUE DOPPLER FREE WALL SYSTOLIC VELOCITY 1 (APICAL 4 CHAMBER VIEW): 18.28 CM/S
SINUS: 2.3 CM
STJ: 2.2 CM
TDI LATERAL: 0.05 M/S
TDI SEPTAL: 0.04 M/S
TDI: 0.05 M/S
TRICUSPID ANNULAR PLANE SYSTOLIC EXCURSION: 2.9 CM
TV PEAK GRADIENT: 49 MMHG
TV REST PULMONARY ARTERY PRESSURE: 52 MMHG
Z-SCORE OF LEFT VENTRICULAR DIMENSION IN END DIASTOLE: 0.16
Z-SCORE OF LEFT VENTRICULAR DIMENSION IN END SYSTOLE: 0.88

## 2025-04-29 PROCEDURE — 99387 INIT PM E/M NEW PAT 65+ YRS: CPT | Mod: S$GLB,,, | Performed by: STUDENT IN AN ORGANIZED HEALTH CARE EDUCATION/TRAINING PROGRAM

## 2025-04-29 PROCEDURE — 71046 X-RAY EXAM CHEST 2 VIEWS: CPT | Mod: TC,TXP

## 2025-04-29 PROCEDURE — 1159F MED LIST DOCD IN RCRD: CPT | Mod: CPTII,S$GLB,, | Performed by: STUDENT IN AN ORGANIZED HEALTH CARE EDUCATION/TRAINING PROGRAM

## 2025-04-29 PROCEDURE — 77067 SCR MAMMO BI INCL CAD: CPT | Mod: TC,TXP

## 2025-04-29 PROCEDURE — 76770 US EXAM ABDO BACK WALL COMP: CPT | Mod: 26,TXP,, | Performed by: RADIOLOGY

## 2025-04-29 PROCEDURE — 93306 TTE W/DOPPLER COMPLETE: CPT | Mod: TXP

## 2025-04-29 PROCEDURE — 99999 PR PBB SHADOW E&M-EST. PATIENT-LVL IV: CPT | Mod: PBBFAC,TXP,, | Performed by: NURSE PRACTITIONER

## 2025-04-29 PROCEDURE — 74176 CT ABD & PELVIS W/O CONTRAST: CPT | Mod: 26,TXP,, | Performed by: INTERNAL MEDICINE

## 2025-04-29 PROCEDURE — 4010F ACE/ARB THERAPY RXD/TAKEN: CPT | Mod: CPTII,S$GLB,, | Performed by: STUDENT IN AN ORGANIZED HEALTH CARE EDUCATION/TRAINING PROGRAM

## 2025-04-29 PROCEDURE — 3077F SYST BP >= 140 MM HG: CPT | Mod: CPTII,S$GLB,, | Performed by: STUDENT IN AN ORGANIZED HEALTH CARE EDUCATION/TRAINING PROGRAM

## 2025-04-29 PROCEDURE — 3288F FALL RISK ASSESSMENT DOCD: CPT | Mod: CPTII,S$GLB,, | Performed by: STUDENT IN AN ORGANIZED HEALTH CARE EDUCATION/TRAINING PROGRAM

## 2025-04-29 PROCEDURE — 3080F DIAST BP >= 90 MM HG: CPT | Mod: CPTII,S$GLB,, | Performed by: STUDENT IN AN ORGANIZED HEALTH CARE EDUCATION/TRAINING PROGRAM

## 2025-04-29 PROCEDURE — 1126F AMNT PAIN NOTED NONE PRSNT: CPT | Mod: CPTII,S$GLB,, | Performed by: STUDENT IN AN ORGANIZED HEALTH CARE EDUCATION/TRAINING PROGRAM

## 2025-04-29 PROCEDURE — 76770 US EXAM ABDO BACK WALL COMP: CPT | Mod: TC,TXP

## 2025-04-29 PROCEDURE — 71046 X-RAY EXAM CHEST 2 VIEWS: CPT | Mod: 26,TXP,, | Performed by: RADIOLOGY

## 2025-04-29 PROCEDURE — 93306 TTE W/DOPPLER COMPLETE: CPT | Mod: 26,TXP,, | Performed by: INTERNAL MEDICINE

## 2025-04-29 PROCEDURE — 1101F PT FALLS ASSESS-DOCD LE1/YR: CPT | Mod: CPTII,S$GLB,, | Performed by: STUDENT IN AN ORGANIZED HEALTH CARE EDUCATION/TRAINING PROGRAM

## 2025-04-29 PROCEDURE — 74176 CT ABD & PELVIS W/O CONTRAST: CPT | Mod: TC,TXP

## 2025-04-29 PROCEDURE — 99999 PR PBB SHADOW E&M-EST. PATIENT-LVL IV: CPT | Mod: PBBFAC,,, | Performed by: STUDENT IN AN ORGANIZED HEALTH CARE EDUCATION/TRAINING PROGRAM

## 2025-04-29 PROCEDURE — 3008F BODY MASS INDEX DOCD: CPT | Mod: CPTII,S$GLB,, | Performed by: STUDENT IN AN ORGANIZED HEALTH CARE EDUCATION/TRAINING PROGRAM

## 2025-04-29 RX ORDER — FLUTICASONE PROPIONATE 50 MCG
SPRAY, SUSPENSION (ML) NASAL
COMMUNITY

## 2025-04-29 RX ORDER — CINACALCET 30 MG/1
1 TABLET, FILM COATED ORAL
COMMUNITY
Start: 2025-03-11

## 2025-04-29 NOTE — LETTER
May 1, 2025        Sanya Waterman  [address]  [place] [address]  Phone: [phone]  Fax: [phone]             [facility]- Transplant 1st Fl  [address]  [facility] [address]  Phone: [phone]   Patient: Veronica Arauz   MR Number: [id number]   YOB: 1960   Date of Visit: 4/29/2025       Dear Dr. Sanya Waterman    Thank you for referring Veronica Arauz to me for evaluation. Attached you will find relevant portions of my assessment and plan of care.    If you have questions, please do not hesitate to call me. I look forward to following Veronica Arauz along with you.    Sincerely,    Alexandra Moreno, NP    Enclosure    If you would like to receive this communication electronically, please contact [email or web address] or [phone] to request [facility] [employer] access.    [facility] [facility] is a tool which provides read-only access to select patient information with whom you have a relationship. Its easy to use and provides real time access to review your patients record including encounter summaries, notes, results, and demographic information.    If you feel you have received this communication in error or would no longer like to receive these types of communications, please e-mail [email or web address]

## 2025-04-29 NOTE — PROGRESS NOTES
History & Physical  Gynecology      SUBJECTIVE:     Chief Complaint: Well Woman       History of Present Illness:  Annual Exam-Postmenopausal  Patient presents for annual exam as part of transplant evaluation. She has no complaints today.  She is s/p ELSA/BSO approximately 32 years ago for management of menorrhagia and uterine fibroids. She denies history of abnormal pap smears prior to hysterectomy. She has never used HRT.    She is not sexually active. 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  She does not smoke.     GYN screening history: last pap: prior to hysterectomy  Mammogram history: done today  Colonoscopy history: 2023 - repeat in 5 years  Dexa history: pt reports having done in past few years with normal BMD    FH:   Breast cancer: denies  Colon cancer: grandmother   Ovarian cancer: denies    Review of patient's allergies indicates:  No Known Allergies    Past Medical History:   Diagnosis Date    Anemia, unspecified     Chronic kidney disease, stage 5     Diabetes mellitus     Diabetes mellitus, type 2     Hyperlipidemia     Hypertension     Mechanical complication of arteriovenous surgical fistula     Renal disorder      Past Surgical History:   Procedure Laterality Date    AV FISTULA PLACEMENT Left      SECTION      x2    COLONOSCOPY      HYSTERECTOMY      REVISION OF ARTERIOVENOUS FISTULA Left 2023    Procedure: REVISION, AV FISTULA;  Surgeon: Kala Rendon MD;  Location: [facility];  Service: Peripheral Vascular;  Laterality: Left;  LEFT RADIOCEPHALIC REVISION WITH SUPERFICIALIZATION // SUPINE //  AXILLARY BLOCK     OB History          2    Para   2    Term   2            AB        Living             SAB        IAB        Ectopic        Multiple        Live Births                   Family History   Family history unknown: Yes     Social History[1]    Current Outpatient Medications   Medication Sig    cinacalcet (SENSIPAR)  30 MG Tab Take 1 tablet by mouth.    losartan (COZAAR) 100 MG tablet losartan 100 mg tablet   TAKE 1/2 TABLET BY MOUTH EVERY DAY    sevelamer carbonate (RENVELA) 0.8 gram PwPk Take 1 packet by mouth 3 (three) times daily with meals.    allopurinoL (ZYLOPRIM) 100 MG tablet Take 100 mg by mouth once daily. (Patient not taking: Reported on 4/29/2025)    amLODIPine (NORVASC) 5 MG tablet Take 5 mg by mouth once daily. (Patient not taking: Reported on 4/29/2025)    atorvastatin (LIPITOR) 40 MG tablet Take 1 tablet (40 mg total) by mouth once daily.    cholecalciferol, vitamin D3, 125 mcg (5,000 unit) capsule Take 1 capsule by mouth once daily. (Patient not taking: Reported on 4/29/2025)    fenofibrate (TRICOR) 145 MG tablet fenofibrate nanocrystallized 145 mg tablet   TAKE ONE TABLET BY MOUTH ONCE DAILY to reduce triglycerides. (Patient not taking: Reported on 4/29/2025)    fluticasone propionate (FLONASE) 50 mcg/actuation nasal spray 1 spray in each nostril Nasally Once a day for 30 days (Patient not taking: Reported on 4/29/2025)    furosemide (LASIX) 80 MG tablet Take 1 tablet (80 mg total) by mouth 2 (two) times daily.    ivermectin (STROMECTOL) 3 mg Tab Take five tablets on day one. Take five tablets on day two. Repeat course two weeks later. (Patient not taking: Reported on 4/29/2025)    labetaloL (NORMODYNE) 200 MG tablet Take 1 tablet (200 mg total) by mouth 2 (two) times daily.    sodium bicarbonate 650 MG tablet Take 2 tablets (1,300 mg total) by mouth 2 (two) times daily. (Patient taking differently: Take 650 mg by mouth 2 (two) times daily.)    TRULICITY 4.5 mg/0.5 mL pen injector Inject 1.5 mg into the skin once a week. (Patient not taking: Reported on 4/29/2025)     No current facility-administered medications for this visit.       Review of Systems:  Review of Systems   Constitutional:  Negative for chills and fever.   Respiratory:  Negative for shortness of breath.    Cardiovascular:  Negative for chest  pain.   Gastrointestinal:  Negative for abdominal pain, nausea and vomiting.   Endocrine: Negative for hot flashes.   Genitourinary:  Negative for dysuria, hematuria, vaginal bleeding, vaginal discharge and vaginal pain.   Integumentary:  Negative for breast mass, nipple discharge and breast skin changes.   Neurological:  Negative for headaches.   Hematological:  Does not bruise/bleed easily.   Psychiatric/Behavioral:  Negative for depression.    Breast: Negative for mass, mastodynia, nipple discharge and skin changes       OBJECTIVE:     Physical Exam:  Physical Exam  Constitutional:       Appearance: Normal appearance.   HENT:      Head: Normocephalic and atraumatic.   Eyes:      Conjunctiva/sclera: Conjunctivae normal.      Pupils: Pupils are equal, round, and reactive to light.   Cardiovascular:      Rate and Rhythm: Normal rate and regular rhythm.   Pulmonary:      Effort: Pulmonary effort is normal. No respiratory distress.   Chest:   Breasts:     Right: No inverted nipple, mass, nipple discharge, skin change or tenderness.      Left: No inverted nipple, mass, nipple discharge, skin change or tenderness.   Abdominal:      General: Abdomen is flat. There is no distension.      Palpations: Abdomen is soft.      Tenderness: There is no abdominal tenderness.   Genitourinary:     General: Normal vulva.      Vagina: No vaginal discharge, tenderness or bleeding.      Uterus: Absent.       Comments: Uterus and cervix surgically absent  Musculoskeletal:         General: Normal range of motion.      Cervical back: Normal range of motion.   Neurological:      Mental Status: She is alert.   Psychiatric:         Mood and Affect: Mood normal.         Thought Content: Thought content normal.         ASSESSMENT:       ICD-10-CM ICD-9-CM    1. Well woman exam with routine gynecological exam  Z01.419 V72.31       2. Organ transplant candidate  Z76.82 V49.83 Ambulatory referral/consult to Gynecology             Plan:      Veronica  was seen today for well woman.    Diagnoses and all orders for this visit:    Well woman exam with routine gynecological exam    Organ transplant candidate  -     Ambulatory referral/consult to Gynecology        No orders of the defined types were placed in this encounter.      Well Woman:   - Pap smear: s/p hyst without history of RAUL II, does not require screening  - Mammogram: done today, pending results  - Colonoscopy: up to date, repeat 12/2028  - Dexa: up to date  - Immunizations: per PCP  - Labs: per transplant team  - Exercise recommended    Follow up in one year for annual, or prn.    Alfonso Wood         [1]   Social History  Tobacco Use    Smoking status: Never    Smokeless tobacco: Never   Substance Use Topics    Alcohol use: Never    Drug use: Never

## 2025-04-29 NOTE — PROGRESS NOTES
AA YEARLY PATIENT EDUCATION NOTE    Ms. Veronica Arauz was seen in pre-kidney transplant clinic for yearly (or six months) evaluation for kidney, kidney/pancreas or pancreas only transplant.  The patient attended a web based education session that discussed/reviewed the following aspects of transplantation: UNOS waitlist management/multiple listings, types of organs offered (KDPI < 85%, KDPI > 85%, PHS increased risk, DCD, HCV+), financial aspects, surgical procedures, dietary instruction pre- and post-transplant, health maintenance pre- and post-transplant, post-transplant hospitalization and outpatient follow-up, potential to participate in a research protocol, and medication management and side effects. A question and answer session was provided after the presentation.    The patient was seen by all members of the multi-disciplinary team to include: Nephrologist/PA, , , Pharmacist and Dietician (if applicable).    The Patient was educated on OPTN policy change regarding race based eGFR. For Black or  Americans, this eGFR could have shown that their kidneys were working better than they were.    Because of this change, we are looking at everyones record and assessing waiting time for people who are eligible. We will be reviewing your medical records and will notify you if you are eligible. We also encouraged patient to provide span 20 labs that are not in our electronic medical records.    The patient reviewed and signed all consents for evaluation which were witnessed and sent to scanning into the [facility] chart.    The patient was given an education book and plan for further evaluation based on her individual assessment.      The patient was encouraged to call with any questions or concerns.

## 2025-04-29 NOTE — PROGRESS NOTES
Kidney Transplant Recipient Reevalulation    Referring Physician: Sanya Waterman  Current Nephrologist: Sanya Waterman  Waitlist Status: active  Dialysis Start Date: 9/6/2023    Subjective:     CC:  Six month reassessment of kidney transplant candidacy.    HPI:  Ms. Arauz is a 64 y.o. year old Black or  female with ESRD secondary to diabetic nephropathy.  She has been on the wait list for a kidney transplant at [facility] since 9/6/2023. Patient is currently on hemodialysis started on 9/6/2023. Patient is dialyzing on MWF schedule.  Patient reports that she is tolerating dialysis well.. She has a LUE AV fistula. Patient denies any recent hospitalizations or ED visits.    Hospitalizations/ED visits:  ER visit for viral respiratory infection    Interval History:   Reports doing well. No problems with dialysis. Still work. She is very active and not limited functionally.     CXR: 4/29/25 lungs clear  CT A/P: 4/29/25 pending surgeon review  Renal US: 4/29/25 Simple left renal cysts.   Echo:  Results for orders placed during the hospital encounter of 04/29/25    Echo    Interpretation Summary    Left Ventricle: The left ventricle is normal in size. Mildly increased ventricular mass. Mildly increased wall thickness. There is mild concentric hypertrophy. Normal wall motion. There is normal systolic function with a visually estimated ejection fraction of 60 - 65%. Ejection fraction is approximately 60%. Grade II diastolic dysfunction.    Right Ventricle: The right ventricle has mild enlargement. Wall thickness is normal. Systolic function is normal.    Left Atrium: Moderately dilated    Right Atrium: Right atrium is mildly dilated.    Aortic Valve: The aortic valve is a trileaflet valve. There is moderate aortic valve sclerosis. There is mild annular calcification present. There is mild stenosis. Aortic valve area by VTI is 1.9 cm2. Aortic valve peak velocity is 2.5 m/s. Mean gradient is 13 mmHg. The  dimensionless index is 0.68. There is mild aortic regurgitation.    Mitral Valve: There is bileaflet sclerosis. There is moderate posterior mitral annular calcification. There is mild regurgitation.    Tricuspid Valve: There is mild to moderate regurgitation.    Pulmonary Artery: There is mild pulmonary hypertension. The estimated pulmonary artery systolic pressure is 52 mmHg.    IVC/SVC: Normal venous pressure at 3 mmHg.    Pericardium: There is a small circumferential effusion.    Mobile likely caseous posterior mitral annular calcifications      PET Stress: 23 normal perfusion study      Colonoscopy: 23: normal, repeat in 5 years due to family history  PTH:  Lab Results   Component Value Date    .7 (H) 2025    CALCIUM 9.5 10/05/2023    PHOS 4.5 10/05/2023     PAP: s/p hysterectomy with BSO  MM25    Current Medications[1]      Past Medical History:   Diagnosis Date    Anemia, unspecified     Chronic kidney disease, stage 5     Diabetes mellitus     Diabetes mellitus, type 2     Hyperlipidemia     Hypertension     Mechanical complication of arteriovenous surgical fistula     Renal disorder        Past Surgical History:   Procedure Laterality Date    AV FISTULA PLACEMENT Left      SECTION      x2    COLONOSCOPY      HYSTERECTOMY      REVISION OF ARTERIOVENOUS FISTULA Left 2023    Procedure: REVISION, AV FISTULA;  Surgeon: Kala Rendon MD;  Location: [facility];  Service: Peripheral Vascular;  Laterality: Left;  LEFT RADIOCEPHALIC REVISION WITH SUPERFICIALIZATION // SUPINE //  AXILLARY BLOCK         Review of Systems   Constitutional:  Negative for appetite change, chills, fatigue and fever.   HENT:  Negative for trouble swallowing.    Respiratory:  Negative for cough, chest tightness, shortness of breath and wheezing.    Cardiovascular:  Negative for chest pain, palpitations and leg swelling.   Gastrointestinal:  Negative for abdominal pain, constipation, diarrhea  and nausea.   Genitourinary:  Positive for decreased urine volume.   Musculoskeletal:  Negative for arthralgias and myalgias.   Skin:  Negative for rash.   Neurological:  Negative for dizziness, weakness, light-headedness and headaches.   Psychiatric/Behavioral:  Negative for sleep disturbance.        Objective:   BP (!) 199/80 (BP Location: Right arm, Patient Position: Sitting)   Pulse 78   Temp 96.9 °F (36.1 °C) (Oral)   Resp 20   Ht 5' (1.524 m)   Wt 82.4 kg (181 lb 10.5 oz)   SpO2 95%   BMI 35.48 kg/m²       Physical Exam  Constitutional:       General: She is not in acute distress.     Appearance: She is well-developed. She is not diaphoretic.   Cardiovascular:      Rate and Rhythm: Normal rate and regular rhythm.      Heart sounds: Normal heart sounds.   Pulmonary:      Effort: Pulmonary effort is normal.      Breath sounds: Normal breath sounds.   Abdominal:      General: Bowel sounds are normal.      Palpations: Abdomen is soft.   Musculoskeletal:         General: No tenderness. Normal range of motion.   Skin:     General: Skin is warm and dry.      Findings: No rash.      Nails: There is no clubbing.   Neurological:      Mental Status: She is alert and oriented to person, place, and time.   Psychiatric:         Behavior: Behavior normal.         Labs:  Lab Results   Component Value Date    WBC 8.43 04/07/2025    HGB 11 (L) 04/21/2025    HCT 31.9 (L) 04/07/2025     10/05/2023    K 3.4 (L) 10/05/2023    CL 96 10/05/2023    CO2 28 10/05/2023    BUN 25 (H) 10/05/2023    CREATININE 5.3 (H) 10/05/2023    EGFRNORACEVR 8.5 (A) 10/05/2023    CALCIUM 9.5 10/05/2023    PHOS 4.5 10/05/2023    MG 1.70 09/06/2023    ALBUMIN 3.7 10/05/2023    AST 16 10/05/2023    ALT 17 10/05/2023    .7 (H) 04/29/2025       Lab Results   Component Value Date    BILIRUBINUA Negative 09/05/2023    PROTEINUA 3+ (A) 09/05/2023    NITRITE Negative 09/05/2023    RBCUA 0-5 09/05/2023    WBCUA 3-5 09/05/2023       No results  "found for: "HLAABCTYPE"    Lab Results   Component Value Date    CPRA 55 02/10/2025    NE3URSP Cw17 02/10/2025    CIABCLM CW7--WEAK A26 12/11/2024    CIIAB DQ9,DQ8,DQ5 02/10/2025    ABCMT DQ7, WEAK, DQ6, 02/10/2025       Labs were reviewed with the patient.    Pre-transplant Workup:   Reviewed with the patient.    Assessment:     1. Patient on waiting list for kidney transplant    2. ESRD (end stage renal disease)    3. Type 2 diabetes mellitus with chronic kidney disease on chronic dialysis, without long-term current use of insulin    4. Benign hypertension with ESRD (end-stage renal disease)        Plan:   Pending MMG results    Transplant Candidacy:   Ms. Arauz is a suitable kidney transplant candidate.  Meets center eligibility for accepting HCV+ donor offer - Yes.  Patient educated on HCV+ donors. Veronica is willing  to accept HCV+ donor offer -  Yes   Patient is a candidate for KDPI > 85 kidney donor offer - Yes.  She remains in overall stable health, and will remain active on the transplant list.    Patient advised that it is recommended that all transplant candidates, and their close contacts and household members receive Covid vaccination.    Alexandra Moreno NP       Follow-up:   In addition to the tests noted in the plan, Ms. Arauz will continue to have reevaluation as per the standing pre-kidney transplant protocol:  Monthly blood for PRA  Annual return to clinic, except HIV positive, > 65 years of age, or pancreas transplant candidates who will be scheduled to see transplant every 6 months while in pre-transplant phase  Annual re-testing: CXR, EKG, yearly mammograms for women over 40 and PSA for males over 40, cardiology follow-up as recommended by initial cardiology pre-transplant evaluation  Renal ultrasound every 2 years  Baseline colonoscopy after age 50 and repeated as recommended    UNOS Patient Status  Functional Status: 60% - Requires occasional assistance but is able to care for " needs  Physical Capacity: No Limitations           [1]   Current Outpatient Medications:     cinacalcet (SENSIPAR) 30 MG Tab, Take 1 tablet by mouth., Disp: , Rfl:     losartan (COZAAR) 100 MG tablet, losartan 100 mg tablet  TAKE 1/2 TABLET BY MOUTH EVERY DAY, Disp: , Rfl:     sevelamer carbonate (RENVELA) 0.8 gram PwPk, Take 1 packet by mouth 3 (three) times daily with meals., Disp: , Rfl:     allopurinoL (ZYLOPRIM) 100 MG tablet, Take 100 mg by mouth once daily. (Patient not taking: Reported on 4/29/2025), Disp: , Rfl:     amLODIPine (NORVASC) 5 MG tablet, Take 5 mg by mouth once daily. (Patient not taking: Reported on 4/29/2025), Disp: , Rfl:     atorvastatin (LIPITOR) 40 MG tablet, Take 1 tablet (40 mg total) by mouth once daily., Disp: 90 tablet, Rfl: 3    cholecalciferol, vitamin D3, 125 mcg (5,000 unit) capsule, Take 1 capsule by mouth once daily. (Patient not taking: Reported on 4/29/2025), Disp: , Rfl:     fenofibrate (TRICOR) 145 MG tablet, fenofibrate nanocrystallized 145 mg tablet  TAKE ONE TABLET BY MOUTH ONCE DAILY to reduce triglycerides. (Patient not taking: Reported on 4/29/2025), Disp: , Rfl:     fluticasone propionate (FLONASE) 50 mcg/actuation nasal spray, 1 spray in each nostril Nasally Once a day for 30 days (Patient not taking: Reported on 4/29/2025), Disp: , Rfl:     furosemide (LASIX) 80 MG tablet, Take 1 tablet (80 mg total) by mouth 2 (two) times daily., Disp: 60 tablet, Rfl: 11    ivermectin (STROMECTOL) 3 mg Tab, Take five tablets on day one. Take five tablets on day two. Repeat course two weeks later. (Patient not taking: Reported on 4/29/2025), Disp: 20 tablet, Rfl: 0    labetaloL (NORMODYNE) 200 MG tablet, Take 1 tablet (200 mg total) by mouth 2 (two) times daily., Disp: 60 tablet, Rfl: 11    sodium bicarbonate 650 MG tablet, Take 2 tablets (1,300 mg total) by mouth 2 (two) times daily. (Patient taking differently: Take 650 mg by mouth 2 (two) times daily.), Disp: 120 tablet, Rfl:  11    TRULICITY 4.5 mg/0.5 mL pen injector, Inject 1.5 mg into the skin once a week. (Patient not taking: Reported on 4/29/2025), Disp: , Rfl:

## 2025-05-14 PROCEDURE — 99001 SPECIMEN HANDLING PT-LAB: CPT | Mod: TXP | Performed by: NURSE PRACTITIONER

## 2025-05-14 PROCEDURE — 36415 COLL VENOUS BLD VENIPUNCTURE: CPT | Mod: TXP

## 2025-05-19 ENCOUNTER — LAB VISIT (OUTPATIENT)
Dept: LAB | Facility: HOSPITAL | Age: 65
End: 2025-05-19
Payer: COMMERCIAL

## 2025-05-19 DIAGNOSIS — Z76.82 PRE-KIDNEY TRANSPLANT, LISTED: ICD-10-CM

## 2025-06-09 PROCEDURE — 36415 COLL VENOUS BLD VENIPUNCTURE: CPT | Mod: TXP

## 2025-06-09 PROCEDURE — 86833 HLA CLASS II HIGH DEFIN QUAL: CPT | Mod: TXP | Performed by: NURSE PRACTITIONER

## 2025-06-09 PROCEDURE — 86832 HLA CLASS I HIGH DEFIN QUAL: CPT | Mod: TXP | Performed by: NURSE PRACTITIONER

## 2025-06-19 ENCOUNTER — LAB VISIT (OUTPATIENT)
Dept: LAB | Facility: HOSPITAL | Age: 65
End: 2025-06-19
Payer: COMMERCIAL

## 2025-06-19 DIAGNOSIS — Z76.82 PRE-KIDNEY TRANSPLANT, LISTED: ICD-10-CM

## 2025-06-20 LAB — HPRA INTERPRETATION: NORMAL

## 2025-07-09 PROCEDURE — 36415 COLL VENOUS BLD VENIPUNCTURE: CPT | Mod: TXP

## 2025-07-09 PROCEDURE — 99001 SPECIMEN HANDLING PT-LAB: CPT | Mod: TXP | Performed by: NURSE PRACTITIONER

## 2025-07-16 ENCOUNTER — LAB VISIT (OUTPATIENT)
Dept: LAB | Facility: HOSPITAL | Age: 65
End: 2025-07-16
Payer: COMMERCIAL

## 2025-07-16 DIAGNOSIS — Z76.82 PRE-KIDNEY TRANSPLANT, LISTED: ICD-10-CM

## 2025-08-11 PROCEDURE — 36415 COLL VENOUS BLD VENIPUNCTURE: CPT | Mod: TXP

## 2025-08-11 PROCEDURE — 86833 HLA CLASS II HIGH DEFIN QUAL: CPT | Mod: TXP | Performed by: NURSE PRACTITIONER

## 2025-08-11 PROCEDURE — 86832 HLA CLASS I HIGH DEFIN QUAL: CPT | Mod: TXP | Performed by: NURSE PRACTITIONER

## 2025-08-14 ENCOUNTER — LAB VISIT (OUTPATIENT)
Dept: LAB | Facility: HOSPITAL | Age: 65
End: 2025-08-14
Payer: COMMERCIAL

## 2025-08-14 DIAGNOSIS — Z76.82 PRE-KIDNEY TRANSPLANT, LISTED: ICD-10-CM

## 2025-08-19 LAB — HPRA INTERPRETATION: NORMAL

## 2025-08-21 DIAGNOSIS — Z76.82 ORGAN TRANSPLANT CANDIDATE: Primary | ICD-10-CM

## 2025-08-21 DIAGNOSIS — N18.6 END STAGE RENAL DISEASE: ICD-10-CM

## 2025-08-21 DIAGNOSIS — Z76.82 ORGAN TRANSPLANT CANDIDATE: ICD-10-CM

## 2025-08-21 DIAGNOSIS — Z01.810 HIGH RISK SURGERY, PRE-OPERATIVE CARDIOVASCULAR EXAMINATION: Primary | ICD-10-CM

## 2025-09-02 ENCOUNTER — TELEPHONE (OUTPATIENT)
Dept: TRANSPLANT | Facility: CLINIC | Age: 65
End: 2025-09-02
Payer: COMMERCIAL

## 2025-09-03 ENCOUNTER — TELEPHONE (OUTPATIENT)
Dept: TRANSPLANT | Facility: CLINIC | Age: 65
End: 2025-09-03
Payer: COMMERCIAL

## (undated) DEVICE — Device

## (undated) DEVICE — SUT PROLENE 6-0 24 BV-1

## (undated) DEVICE — KIT SURGICAL TURNOVER

## (undated) DEVICE — DRAPE INCISE IOBAN 2 13X13IN

## (undated) DEVICE — ELECTRODE PATIENT RETURN DISP

## (undated) DEVICE — APPLICATOR CHLORAPREP ORN 26ML

## (undated) DEVICE — SYR 10CC LUER LOCK

## (undated) DEVICE — SYS PRINEO SKIN CLOSURE

## (undated) DEVICE — SUT 2-0 12-18IN SILK

## (undated) DEVICE — CUP PROFEX GLASS GRADUATE 1OZ

## (undated) DEVICE — BOWL STERILE LARGE 32OZ

## (undated) DEVICE — SUT VICRYL 3-0 27 SH

## (undated) DEVICE — PAD PREP 50/CA

## (undated) DEVICE — NDL HYPO REG 25G X 1 1/2

## (undated) DEVICE — BAG MEDI-PLAST DECANTER C-FLOW

## (undated) DEVICE — COVER PROBE US 5.5X58L NON LTX

## (undated) DEVICE — SUT 4-0 12-18IN SILK BLACK

## (undated) DEVICE — ADHESIVE DERMABOND ADVANCED

## (undated) DEVICE — SUT MCRYL PLUS 4-0 PS2 27IN

## (undated) DEVICE — SOL NORMAL USPCA 0.9%

## (undated) DEVICE — GLOVE PROTEXIS HYDROGEL SZ6.5

## (undated) DEVICE — SUT 3-0 12-18IN SILK